# Patient Record
Sex: MALE | Race: WHITE | NOT HISPANIC OR LATINO | Employment: OTHER | URBAN - METROPOLITAN AREA
[De-identification: names, ages, dates, MRNs, and addresses within clinical notes are randomized per-mention and may not be internally consistent; named-entity substitution may affect disease eponyms.]

---

## 2017-09-11 ENCOUNTER — HISTORICAL (OUTPATIENT)
Dept: LAB | Facility: HOSPITAL | Age: 62
End: 2017-09-11

## 2017-09-11 LAB
ABS NEUT (OLG): 5.46
ALBUMIN SERPL-MCNC: 4 GM/DL (ref 3.4–5)
ALBUMIN/GLOB SERPL: 0.9 RATIO (ref 1.1–2)
ALP SERPL-CCNC: 29 UNIT/L (ref 50–136)
ALT SERPL-CCNC: 14 UNIT/L (ref 12–78)
AST SERPL-CCNC: 18 UNIT/L (ref 10–37)
BASOPHILS # BLD AUTO: 0.05 X10(3)/MCL
BASOPHILS NFR BLD AUTO: 0.5 %
BILIRUB SERPL-MCNC: 0.4 MG/DL (ref 0.2–1)
BILIRUBIN DIRECT+TOT PNL SERPL-MCNC: 0.1 MG/DL (ref 0.05–0.2)
BILIRUBIN DIRECT+TOT PNL SERPL-MCNC: 0.3 MG/DL
BUN SERPL-MCNC: 14 MG/DL (ref 7–18)
CALCIUM SERPL-MCNC: 9.8 MG/DL (ref 8.5–10.1)
CHLORIDE SERPL-SCNC: 99 MMOL/L (ref 98–107)
CHOLEST SERPL-MCNC: 186 MG/DL (ref 50–200)
CHOLEST/HDLC SERPL: 2 {RATIO} (ref 0–5)
CO2 SERPL-SCNC: 27.8 MMOL/L (ref 21–32)
CREAT SERPL-MCNC: 1.01 MG/DL (ref 0.7–1.3)
EOSINOPHIL # BLD AUTO: 0.19 X10(3)/MCL
EOSINOPHIL NFR BLD AUTO: 2 %
ERYTHROCYTE [DISTWIDTH] IN BLOOD BY AUTOMATED COUNT: 15 %
GLOBULIN SER-MCNC: 4.3 GM/DL (ref 2.4–3.5)
GLUCOSE SERPL-MCNC: 103 MG/DL (ref 74–106)
HCT VFR BLD AUTO: 48.7 % (ref 39–49)
HDLC SERPL-MCNC: 85 MG/DL (ref 35–60)
HGB BLD-MCNC: 16.6 GM/DL (ref 12.6–16.6)
IMM GRANULOCYTES # BLD AUTO: 0.07 10*3/UL (ref 0–0.1)
IMM GRANULOCYTES NFR BLD AUTO: 0.7 % (ref 0–1)
LDLC SERPL CALC-MCNC: 92 MG/DL (ref 50–140)
LYMPHOCYTES # BLD AUTO: 2.71 X10(3)/MCL
LYMPHOCYTES NFR BLD AUTO: 28.5 %
MCH RBC QN AUTO: 30.5 PG (ref 27–33)
MCHC RBC AUTO-ENTMCNC: 34.1 GM/DL (ref 32–35)
MCV RBC AUTO: 89.4 FL (ref 84–97)
MONOCYTES # BLD AUTO: 1.02 X10(3)/MCL
MONOCYTES NFR BLD AUTO: 10.7 %
NEUTROPHILS # BLD AUTO: 5.46 X10(3)/MCL
NEUTROPHILS NFR BLD AUTO: 57.6 %
PLATELET # BLD AUTO: 265 X10(3)/MCL (ref 151–368)
PMV BLD AUTO: 10 FL
POTASSIUM SERPL-SCNC: 3.9 MMOL/L (ref 3.5–5.1)
PROT SERPL-MCNC: 8.3 GM/DL (ref 6.4–8.2)
PSA SERPL-MCNC: 0.48 NG/ML (ref 0–4)
RBC # BLD AUTO: 5.45 X10(6)/MCL (ref 4.3–5.6)
SODIUM SERPL-SCNC: 138 MMOL/L (ref 136–145)
TRIGL SERPL-MCNC: 43 MG/DL (ref 30–150)
VLDLC SERPL CALC-MCNC: 9 MG/DL
WBC # SPEC AUTO: 9.5 X10(3)/MCL (ref 3.4–9.2)

## 2018-08-09 ENCOUNTER — HISTORICAL (OUTPATIENT)
Dept: SURGERY | Facility: HOSPITAL | Age: 63
End: 2018-08-09

## 2018-08-09 LAB
ABS NEUT (OLG): 4.3 X10(3)/MCL (ref 1.5–6.9)
ALBUMIN SERPL-MCNC: 3.6 GM/DL (ref 3.4–5)
ALBUMIN/GLOB SERPL: 0.9 RATIO
ALP SERPL-CCNC: 27 UNIT/L (ref 30–113)
ALT SERPL-CCNC: 29 UNIT/L (ref 10–45)
APTT PPP: 26.8 SECOND(S) (ref 25–35)
AST SERPL-CCNC: 29 UNIT/L (ref 15–37)
BASOPHILS # BLD AUTO: 0 X10(3)/MCL (ref 0–0.1)
BASOPHILS NFR BLD AUTO: 0 % (ref 0–1)
BILIRUB SERPL-MCNC: 0.3 MG/DL (ref 0.1–0.9)
BILIRUBIN DIRECT+TOT PNL SERPL-MCNC: 0.1 MG/DL (ref 0–0.3)
BILIRUBIN DIRECT+TOT PNL SERPL-MCNC: 0.2 MG/DL
BUN SERPL-MCNC: 15 MG/DL (ref 10–20)
CALCIUM SERPL-MCNC: 9.7 MG/DL (ref 8–10.5)
CHLORIDE SERPL-SCNC: 99 MMOL/L (ref 100–108)
CO2 SERPL-SCNC: 31 MMOL/L (ref 21–35)
CREAT SERPL-MCNC: 1.27 MG/DL (ref 0.7–1.3)
EOSINOPHIL # BLD AUTO: 0.3 X10(3)/MCL (ref 0–0.6)
EOSINOPHIL NFR BLD AUTO: 4 % (ref 0–5)
ERYTHROCYTE [DISTWIDTH] IN BLOOD BY AUTOMATED COUNT: 14.9 % (ref 11.5–17)
GLOBULIN SER-MCNC: 4 GM/DL
GLUCOSE SERPL-MCNC: 93 MG/DL (ref 75–116)
HCT VFR BLD AUTO: 42.7 % (ref 42–52)
HGB BLD-MCNC: 15.1 GM/DL (ref 14–18)
INR PPP: 1 (ref 0–1.2)
LYMPHOCYTES # BLD AUTO: 2.6 X10(3)/MCL (ref 0.5–4.1)
LYMPHOCYTES NFR BLD AUTO: 30.8 % (ref 15–40)
MCH RBC QN AUTO: 31 PG (ref 27–34)
MCHC RBC AUTO-ENTMCNC: 35 GM/DL (ref 31–36)
MCV RBC AUTO: 88 FL (ref 80–99)
MONOCYTES # BLD AUTO: 1.2 X10(3)/MCL (ref 0–1.1)
MONOCYTES NFR BLD AUTO: 14 % (ref 4–12)
NEUTROPHILS # BLD AUTO: 4.3 X10(3)/MCL (ref 1.5–6.9)
NEUTROPHILS NFR BLD AUTO: 51 % (ref 43–75)
PLATELET # BLD AUTO: 220 X10(3)/MCL (ref 140–400)
PMV BLD AUTO: 11 FL (ref 6.8–10)
POTASSIUM SERPL-SCNC: 4.8 MMOL/L (ref 3.6–5.2)
PROT SERPL-MCNC: 7.6 GM/DL (ref 6.4–8.2)
PROTHROMBIN TIME: 10.1 SECOND(S) (ref 9–12)
RBC # BLD AUTO: 4.87 X10(6)/MCL (ref 4.7–6.1)
SODIUM SERPL-SCNC: 137 MMOL/L (ref 135–145)
WBC # SPEC AUTO: 8.5 X10(3)/MCL (ref 4.5–11.5)

## 2018-08-13 ENCOUNTER — HISTORICAL (OUTPATIENT)
Dept: ANESTHESIOLOGY | Facility: HOSPITAL | Age: 63
End: 2018-08-13

## 2018-08-22 ENCOUNTER — HISTORICAL (OUTPATIENT)
Dept: RADIOLOGY | Facility: HOSPITAL | Age: 63
End: 2018-08-22

## 2019-04-23 ENCOUNTER — HISTORICAL (OUTPATIENT)
Dept: ADMINISTRATIVE | Facility: HOSPITAL | Age: 64
End: 2019-04-23

## 2019-07-17 ENCOUNTER — HISTORICAL (OUTPATIENT)
Dept: ADMINISTRATIVE | Facility: HOSPITAL | Age: 64
End: 2019-07-17

## 2019-07-22 PROBLEM — M48.02 CERVICAL STENOSIS OF SPINAL CANAL: Status: ACTIVE | Noted: 2019-07-22

## 2019-07-26 PROBLEM — S14.109A CONTUSION OF CERVICAL CORD: Status: ACTIVE | Noted: 2019-07-26

## 2019-07-26 PROBLEM — R29.898 BILATERAL ARM WEAKNESS: Status: ACTIVE | Noted: 2019-07-26

## 2019-12-18 PROBLEM — Z98.1 S/P CERVICAL SPINAL FUSION: Status: ACTIVE | Noted: 2019-12-18

## 2020-12-07 LAB
ALBUMIN SERPL-MCNC: 4.4 G/DL (ref 3.4–5)
ALBUMIN/GLOB SERPL: 1.5 {RATIO}
ALP SERPL-CCNC: 38 U/L (ref 50–144)
ALT SERPL-CCNC: 11 U/L (ref 1–45)
ANION GAP SERPL CALC-SCNC: 8 MMOL/L (ref 7–16)
AST SERPL-CCNC: 24 U/L (ref 17–59)
BASOPHILS # BLD AUTO: 0.02 X10(3)/MCL (ref 0.01–0.08)
BASOPHILS NFR BLD AUTO: 0.3 % (ref 0.1–1.2)
BILIRUB SERPL-MCNC: 0.3 MG/DL (ref 0.1–1)
BUN SERPL-MCNC: 14 MG/DL (ref 7–20)
CALCIUM SERPL-MCNC: 10 MG/DL (ref 8.4–10.2)
CHLORIDE SERPL-SCNC: 98 MMOL/L (ref 94–110)
CHOLEST SERPL-MCNC: 163 MG/DL (ref 0–200)
CO2 SERPL-SCNC: 32 MMOL/L (ref 21–32)
CREAT SERPL-MCNC: 0.8 MG/DL (ref 0.66–1.25)
CREAT/UREA NIT SERPL: 17.5 (ref 12–20)
DEPRECATED CALCIDIOL+CALCIFEROL SERPL-MC: 24.1 NG/ML (ref 30–100)
EOSINOPHIL # BLD AUTO: 0.23 X10(3)/MCL (ref 0.04–0.54)
EOSINOPHIL NFR BLD AUTO: 2.9 % (ref 0.7–7)
ERYTHROCYTE [DISTWIDTH] IN BLOOD BY AUTOMATED COUNT: 15.1 % (ref 11.6–14.4)
EST. AVERAGE GLUCOSE BLD GHB EST-MCNC: 99 MG/DL (ref 70–115)
GLOBULIN SER-MCNC: 2.9 G/DL (ref 2–3.9)
GLUCOSE SERPL-MCNC: 90 MGM./DL (ref 70–115)
HBA1C MFR BLD: 5.3 % (ref 4–6)
HCT VFR BLD AUTO: 50.9 % (ref 36–52)
HDLC SERPL-MCNC: 57 MG/DL (ref 40–60)
HGB BLD-MCNC: 16.2 G/DL (ref 13–18)
IMM GRANULOCYTES # BLD AUTO: 0.02 X10E3/UL (ref 0–0.03)
IMM GRANULOCYTES NFR BLD AUTO: 0.3 % (ref 0–0.5)
LDLC SERPL CALC-MCNC: 94.2 MG/DL (ref 30–100)
LYMPHOCYTES # BLD AUTO: 2.77 X10(3)/MCL (ref 1.32–3.57)
LYMPHOCYTES NFR BLD AUTO: 34.7 % (ref 20–55)
MCH RBC QN AUTO: 28.9 PG (ref 27–34)
MCHC RBC AUTO-ENTMCNC: 31.8 G/DL (ref 31–37)
MCV RBC AUTO: 90.9 FL (ref 79–99)
MONOCYTES # BLD AUTO: 0.76 X10(3)/MCL (ref 0.3–0.82)
MONOCYTES NFR BLD AUTO: 9.5 % (ref 4.7–12.5)
NEUTROPHILS # BLD AUTO: 4.19 X10(3)/MCL (ref 1.78–5.38)
NEUTROPHILS NFR BLD AUTO: 52.3 % (ref 37–73)
PLATELET # BLD AUTO: 224 X10(3)/MCL (ref 140–371)
PMV BLD AUTO: 11.8 FL (ref 9.4–12.4)
POTASSIUM SERPL-SCNC: 4.3 MMOL/L (ref 3.5–5.1)
PROT SERPL-MCNC: 7.3 G/DL (ref 6.3–8.2)
RBC # BLD AUTO: 5.6 X10(6)/MCL (ref 4–6)
SODIUM SERPL-SCNC: 138 MMOL/L (ref 135–145)
TRIGL SERPL-MCNC: 78 MG/DL (ref 30–200)
TSH SERPL-ACNC: 0.8 UIU/ML (ref 0.36–3.74)
URATE SERPL-MCNC: 3.6 MG/DL (ref 2.6–7.2)
VIT B12 SERPL-MCNC: 245 PG/ML (ref 211–946)
WBC # SPEC AUTO: 8 X10(3)/MCL (ref 4–11.5)

## 2022-04-10 ENCOUNTER — HISTORICAL (OUTPATIENT)
Dept: ADMINISTRATIVE | Facility: HOSPITAL | Age: 67
End: 2022-04-10

## 2022-04-28 VITALS
WEIGHT: 196 LBS | HEIGHT: 72 IN | BODY MASS INDEX: 26.55 KG/M2 | DIASTOLIC BLOOD PRESSURE: 84 MMHG | SYSTOLIC BLOOD PRESSURE: 162 MMHG

## 2022-04-30 ENCOUNTER — HISTORICAL (OUTPATIENT)
Dept: ADMINISTRATIVE | Facility: HOSPITAL | Age: 67
End: 2022-04-30

## 2022-04-30 NOTE — OP NOTE
ADMITTING DIAGNOSIS:  Need for age-appropriate screening colonoscopy.    PROCEDURE:  Colonoscopy to the cecum with intubation of ileocecal valve.    POSTOPERATIVE DIAGNOSES:    1. Normal terminal ileum up to 20 cm.    2. Normal colonoscopy.    3. Diverticulosis of the sigmoid colon.    4. Grade 3 internal hemorrhoids.    PLAN:    1. Follow up in the office in a week to discuss results.    2. Follow up in 2 years recheck stools for blood.    3. Follow up in 10 years repeat screening colonoscopy.    INDICATIONS:  The patient is a 62-year-old  male with reflux who has asthma and emphysema, on oxygen and inhaler.  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  The patient has also had malaria in [place] when he was younger and smokes about 1-1/2 packs a day for about 50 years.  The patient required endoscopic screening of the colon all the way to the cecum and intubation of the ileocecal valve.    FINDINGS:  The terminal ileum was normal up to 20 cm.  Cecum, ascending colon, transverse colon, descending colon were normal.  The patient's rectosigmoid had some diverticulosis.  Grade 2 internal hemorrhoids were noted.  No other pathology was seen.  Overall, the patient did very well had no problems or difficulties.  He was awakened and sent to recovery in good condition.     I appreciate the consultation referral from Ms Leigh Ng, and will notify her of my findings.  My intention will be to follow up in the office to discuss hemorrhoids and otherwise he did     request to go see Dr. Patricia with regard to his testicular issues as well.  He evidently had an epididymitis and was being treated with antibiotics.        BBS/MODL   DD: 08/13/2018 1426   DT: 08/13/2018 1435  Job # [id number]    cc: LEIGH NG MS

## 2024-08-19 ENCOUNTER — HOSPITAL ENCOUNTER (EMERGENCY)
Facility: HOSPITAL | Age: 69
Discharge: LEFT BEFORE DISCHARGE | End: 2024-08-19
Attending: EMERGENCY MEDICINE
Payer: MEDICAID

## 2024-08-19 VITALS
HEART RATE: 47 BPM | HEIGHT: 73 IN | DIASTOLIC BLOOD PRESSURE: 92 MMHG | SYSTOLIC BLOOD PRESSURE: 180 MMHG | TEMPERATURE: 98 F | WEIGHT: 180 LBS | RESPIRATION RATE: 19 BRPM | BODY MASS INDEX: 23.86 KG/M2 | OXYGEN SATURATION: 96 %

## 2024-08-19 DIAGNOSIS — R11.0 NAUSEA: Primary | ICD-10-CM

## 2024-08-19 DIAGNOSIS — N28.0 RENAL INFARCT: ICD-10-CM

## 2024-08-19 DIAGNOSIS — R11.2 NAUSEA AND VOMITING, UNSPECIFIED VOMITING TYPE: ICD-10-CM

## 2024-08-19 DIAGNOSIS — R10.9 ABDOMINAL PAIN, UNSPECIFIED ABDOMINAL LOCATION: ICD-10-CM

## 2024-08-19 DIAGNOSIS — I10 HYPERTENSION, UNSPECIFIED TYPE: ICD-10-CM

## 2024-08-19 LAB
ALBUMIN SERPL-MCNC: 3.2 G/DL (ref 3.4–4.8)
ALBUMIN/GLOB SERPL: 0.8 RATIO (ref 1.1–2)
ALP SERPL-CCNC: 41 UNIT/L (ref 40–150)
ALT SERPL-CCNC: 89 UNIT/L (ref 0–55)
AMPHET UR QL SCN: NEGATIVE
ANION GAP SERPL CALC-SCNC: 14 MEQ/L
APAP SERPL-MCNC: <3 UG/ML (ref 10–30)
AST SERPL-CCNC: 115 UNIT/L (ref 5–34)
BACTERIA #/AREA URNS AUTO: ABNORMAL /HPF
BARBITURATE SCN PRESENT UR: NEGATIVE
BASOPHILS # BLD AUTO: 0.03 X10(3)/MCL
BASOPHILS NFR BLD AUTO: 0.2 %
BENZODIAZ UR QL SCN: NEGATIVE
BILIRUB SERPL-MCNC: 0.7 MG/DL
BILIRUB UR QL STRIP.AUTO: NEGATIVE
BUN SERPL-MCNC: 12.1 MG/DL (ref 8.4–25.7)
CALCIUM SERPL-MCNC: 9.2 MG/DL (ref 8.8–10)
CANNABINOIDS UR QL SCN: POSITIVE
CHLORIDE SERPL-SCNC: 101 MMOL/L (ref 98–107)
CLARITY UR: CLEAR
CO2 SERPL-SCNC: 20 MMOL/L (ref 23–31)
COCAINE UR QL SCN: NEGATIVE
COLOR UR AUTO: YELLOW
CREAT SERPL-MCNC: 1.14 MG/DL (ref 0.73–1.18)
CREAT/UREA NIT SERPL: 11
EOSINOPHIL # BLD AUTO: 0.01 X10(3)/MCL (ref 0–0.9)
EOSINOPHIL NFR BLD AUTO: 0.1 %
ERYTHROCYTE [DISTWIDTH] IN BLOOD BY AUTOMATED COUNT: 15.2 % (ref 11.5–17)
ETHANOL SERPL-MCNC: <10 MG/DL
FENTANYL UR QL SCN: NEGATIVE
FLUAV AG UPPER RESP QL IA.RAPID: NOT DETECTED
FLUBV AG UPPER RESP QL IA.RAPID: NOT DETECTED
GFR SERPLBLD CREATININE-BSD FMLA CKD-EPI: >60 ML/MIN/1.73/M2
GLOBULIN SER-MCNC: 3.8 GM/DL (ref 2.4–3.5)
GLUCOSE SERPL-MCNC: 116 MG/DL (ref 82–115)
GLUCOSE UR QL STRIP: ABNORMAL
HCT VFR BLD AUTO: 44.9 % (ref 42–52)
HGB BLD-MCNC: 15.6 G/DL (ref 14–18)
HGB UR QL STRIP: ABNORMAL
IMM GRANULOCYTES # BLD AUTO: 0.05 X10(3)/MCL (ref 0–0.04)
IMM GRANULOCYTES NFR BLD AUTO: 0.4 %
KETONES UR QL STRIP: NEGATIVE
LACTATE SERPL-SCNC: 2.1 MMOL/L (ref 0.5–2.2)
LEUKOCYTE ESTERASE UR QL STRIP: NEGATIVE
LIPASE SERPL-CCNC: 7 U/L
LYMPHOCYTES # BLD AUTO: 1.44 X10(3)/MCL (ref 0.6–4.6)
LYMPHOCYTES NFR BLD AUTO: 10.9 %
MAGNESIUM SERPL-MCNC: 1.8 MG/DL (ref 1.6–2.6)
MCH RBC QN AUTO: 29.1 PG (ref 27–31)
MCHC RBC AUTO-ENTMCNC: 34.7 G/DL (ref 33–36)
MCV RBC AUTO: 83.8 FL (ref 80–94)
MDMA UR QL SCN: NEGATIVE
MONOCYTES # BLD AUTO: 1.48 X10(3)/MCL (ref 0.1–1.3)
MONOCYTES NFR BLD AUTO: 11.2 %
MUCOUS THREADS URNS QL MICRO: ABNORMAL /LPF
NEUTROPHILS # BLD AUTO: 10.16 X10(3)/MCL (ref 2.1–9.2)
NEUTROPHILS NFR BLD AUTO: 77.2 %
NITRITE UR QL STRIP: NEGATIVE
NRBC BLD AUTO-RTO: 0 %
OPIATES UR QL SCN: NEGATIVE
PCP UR QL: NEGATIVE
PH UR STRIP: 6 [PH]
PH UR: 6 [PH] (ref 3–11)
PLATELET # BLD AUTO: 167 X10(3)/MCL (ref 130–400)
PMV BLD AUTO: 9.8 FL (ref 7.4–10.4)
POTASSIUM SERPL-SCNC: 4 MMOL/L (ref 3.5–5.1)
PROT SERPL-MCNC: 7 GM/DL (ref 5.8–7.6)
PROT UR QL STRIP: ABNORMAL
RBC # BLD AUTO: 5.36 X10(6)/MCL (ref 4.7–6.1)
RBC #/AREA URNS AUTO: ABNORMAL /HPF
SARS-COV-2 RNA RESP QL NAA+PROBE: NOT DETECTED
SODIUM SERPL-SCNC: 135 MMOL/L (ref 136–145)
SP GR UR STRIP.AUTO: 1.02 (ref 1–1.03)
SPECIFIC GRAVITY, URINE AUTO (.000) (OHS): 1.02 (ref 1–1.03)
SQUAMOUS #/AREA URNS LPF: ABNORMAL /HPF
TROPONIN I SERPL-MCNC: 0.04 NG/ML (ref 0–0.04)
UROBILINOGEN UR STRIP-ACNC: 2
WBC # BLD AUTO: 13.17 X10(3)/MCL (ref 4.5–11.5)
WBC #/AREA URNS AUTO: ABNORMAL /HPF

## 2024-08-19 PROCEDURE — 85025 COMPLETE CBC W/AUTO DIFF WBC: CPT | Performed by: EMERGENCY MEDICINE

## 2024-08-19 PROCEDURE — 80074 ACUTE HEPATITIS PANEL: CPT | Performed by: STUDENT IN AN ORGANIZED HEALTH CARE EDUCATION/TRAINING PROGRAM

## 2024-08-19 PROCEDURE — 0240U COVID/FLU A&B PCR: CPT | Performed by: EMERGENCY MEDICINE

## 2024-08-19 PROCEDURE — 83690 ASSAY OF LIPASE: CPT | Performed by: EMERGENCY MEDICINE

## 2024-08-19 PROCEDURE — 83605 ASSAY OF LACTIC ACID: CPT | Performed by: EMERGENCY MEDICINE

## 2024-08-19 PROCEDURE — 99285 EMERGENCY DEPT VISIT HI MDM: CPT | Mod: 25

## 2024-08-19 PROCEDURE — 81001 URINALYSIS AUTO W/SCOPE: CPT | Mod: XB | Performed by: EMERGENCY MEDICINE

## 2024-08-19 PROCEDURE — 96374 THER/PROPH/DIAG INJ IV PUSH: CPT | Mod: 59

## 2024-08-19 PROCEDURE — 63600175 PHARM REV CODE 636 W HCPCS: Performed by: EMERGENCY MEDICINE

## 2024-08-19 PROCEDURE — 81015 MICROSCOPIC EXAM OF URINE: CPT | Performed by: EMERGENCY MEDICINE

## 2024-08-19 PROCEDURE — 25500020 PHARM REV CODE 255: Performed by: STUDENT IN AN ORGANIZED HEALTH CARE EDUCATION/TRAINING PROGRAM

## 2024-08-19 PROCEDURE — 84484 ASSAY OF TROPONIN QUANT: CPT | Performed by: EMERGENCY MEDICINE

## 2024-08-19 PROCEDURE — 83735 ASSAY OF MAGNESIUM: CPT | Performed by: EMERGENCY MEDICINE

## 2024-08-19 PROCEDURE — 80143 DRUG ASSAY ACETAMINOPHEN: CPT | Performed by: STUDENT IN AN ORGANIZED HEALTH CARE EDUCATION/TRAINING PROGRAM

## 2024-08-19 PROCEDURE — 25000242 PHARM REV CODE 250 ALT 637 W/ HCPCS: Performed by: EMERGENCY MEDICINE

## 2024-08-19 PROCEDURE — 80307 DRUG TEST PRSMV CHEM ANLYZR: CPT | Performed by: EMERGENCY MEDICINE

## 2024-08-19 PROCEDURE — 25000003 PHARM REV CODE 250: Performed by: EMERGENCY MEDICINE

## 2024-08-19 PROCEDURE — 82077 ASSAY SPEC XCP UR&BREATH IA: CPT | Performed by: EMERGENCY MEDICINE

## 2024-08-19 PROCEDURE — 80053 COMPREHEN METABOLIC PANEL: CPT | Performed by: EMERGENCY MEDICINE

## 2024-08-19 RX ORDER — NAPROXEN SODIUM 220 MG/1
324 TABLET, FILM COATED ORAL
Status: COMPLETED | OUTPATIENT
Start: 2024-08-19 | End: 2024-08-19

## 2024-08-19 RX ORDER — DROPERIDOL 2.5 MG/ML
1.25 INJECTION, SOLUTION INTRAMUSCULAR; INTRAVENOUS
Status: COMPLETED | OUTPATIENT
Start: 2024-08-19 | End: 2024-08-19

## 2024-08-19 RX ORDER — ALBUTEROL SULFATE 0.83 MG/ML
2.5 SOLUTION RESPIRATORY (INHALATION)
Status: COMPLETED | OUTPATIENT
Start: 2024-08-19 | End: 2024-08-19

## 2024-08-19 RX ADMIN — ASPIRIN 81 MG CHEWABLE TABLET 324 MG: 81 TABLET CHEWABLE at 04:08

## 2024-08-19 RX ADMIN — ALBUTEROL SULFATE 2.5 MG: 2.5 SOLUTION RESPIRATORY (INHALATION) at 04:08

## 2024-08-19 RX ADMIN — IOHEXOL 100 ML: 350 INJECTION, SOLUTION INTRAVENOUS at 07:08

## 2024-08-19 RX ADMIN — DROPERIDOL 1.25 MG: 2.5 INJECTION, SOLUTION INTRAMUSCULAR; INTRAVENOUS at 04:08

## 2024-08-19 NOTE — ED PROVIDER NOTES
"Encounter Date: 8/19/2024    SCRIBE #1 NOTE: I, Dejon Martin, am scribing for, and in the presence of,  Giovani Hutton MD. I have scribed the following portions of the note - Other sections scribed: HPI, ROS, PE.       History     Chief Complaint   Patient presents with    Nausea     Via [facility] for nausea, shortness of breath, and neck pain x "a few days". States this am BP was elevated, but "hasn't taken any medications for years". Multiple old spinal fractures per pt, bedbound. 4 mg zofran given en route, pt took old lisinopril prior to EMS arrival. Unsure of dosage     Patient is a 69 y/o male with a history of HTN, HLD, CAD who presents to the ED via EMS for evaluation of nausea and vomiting over the past couple of days. Pt states that he cannot tolerate food without vomiting. He also reports associated upper abdominal pain, SOB, and elevated blood pressure with a systolic pressure in the 200s at home. Denies chest pain and bowel movement irregularities, states he has bowel movements every 3-4 days which is normal for him. He is a heavy smoker of both marijuana and cigarettes. He hasn't seen a doctor and hasn't taken any medications or breathing treatments for the past 3-4 years. Pt is bedbound, history of C2-C4 surgery with bilateral lower extremity weakness. Denies any current bedsores. Lives with his daughter.     The history is provided by the patient. No  was used.     Review of patient's allergies indicates:  No Known Allergies  Past Medical History:   Diagnosis Date    Contusion of cervical cord     Hepatitis     Hypertension     Mental disorder      Past Surgical History:   Procedure Laterality Date    FRACTURE SURGERY      SPINE SURGERY  09/04/2019    ACDF @ C3/4, 09/04/19, [facility], DR. SIN.    SPINE SURGERY  10/18/2019    C3-6 DCL, [facility], DR. SIN.     Family History   Problem Relation Name Age of Onset    Heart disease Mother      Cancer Father       Social History     Tobacco Use "    Smoking status: Every Day     Current packs/day: 0.50     Types: Cigarettes    Smokeless tobacco: Never   Substance Use Topics    Alcohol use: Never    Drug use: Yes     Review of Systems   Constitutional:  Negative for chills and fever.   Respiratory:  Positive for shortness of breath. Negative for cough.    Cardiovascular:  Negative for chest pain.   Gastrointestinal:  Positive for abdominal pain, nausea and vomiting. Negative for constipation and diarrhea.   Musculoskeletal:  Negative for myalgias.   All other systems reviewed and are negative.      Physical Exam     Initial Vitals   BP Pulse Resp Temp SpO2   08/19/24 1435 08/19/24 1435 08/19/24 1435 08/19/24 1433 08/19/24 1435   (!) 164/94 (!) 53 18 98.2 °F (36.8 °C) 98 %      MAP       --                Physical Exam    Nursing note and vitals reviewed.  Constitutional: He appears well-developed and well-nourished. No distress.   Smells of cigarettes   HENT:   Head: Normocephalic and atraumatic.   Eyes: Conjunctivae are normal.   Cardiovascular:    Bradycardia present.         Pulmonary/Chest: No respiratory distress. He has no wheezes. He has no rhonchi.   Diminished breath sounds bilaterally   Abdominal: Abdomen is soft. There is abdominal tenderness (upper). There is no rebound and no guarding.   Musculoskeletal:      Comments: Nicotine stains to fingers     Neurological: He is alert and oriented to person, place, and time.   Chronic paresis of lower extremities   Skin: Skin is warm and dry.   Psychiatric: He has a normal mood and affect.         ED Course   Procedures  Labs Reviewed   COMPREHENSIVE METABOLIC PANEL - Abnormal       Result Value    Sodium 135 (*)     Potassium 4.0      Chloride 101      CO2 20 (*)     Glucose 116 (*)     Blood Urea Nitrogen 12.1      Creatinine 1.14      Calcium 9.2      Protein Total 7.0      Albumin 3.2 (*)     Globulin 3.8 (*)     Albumin/Globulin Ratio 0.8 (*)     Bilirubin Total 0.7      ALP 41      ALT 89 (*)     AST  115 (*)     eGFR >60      Anion Gap 14.0      BUN/Creatinine Ratio 11     DRUG SCREEN, URINE (BEAKER) - Abnormal    Amphetamines, Urine Negative      Barbiturates, Urine Negative      Benzodiazepine, Urine Negative      Cannabinoids, Urine Positive (*)     Cocaine, Urine Negative      Fentanyl, Urine Negative      MDMA, Urine Negative      Opiates, Urine Negative      Phencyclidine, Urine Negative      pH, Urine 6.0      Specific Gravity, Urine Auto 1.023      Narrative:     Cut off concentrations:    Amphetamines - 1000 ng/ml  Barbiturates - 200 ng/ml  Benzodiazepine - 200 ng/ml  Cannabinoids (THC) - 50 ng/ml  Cocaine - 300 ng/ml  Fentanyl - 1.0 ng/ml  MDMA - 500 ng/ml  Opiates - 300 ng/ml   Phencyclidine (PCP) - 25 ng/ml    Specimen submitted for drug analysis and tested for pH and specific gravity in order to evaluate sample integrity. Suspect tampering if specific gravity is <1.003 and/or pH is not within the range of 4.5 - 8.0  False negatives may result form substances such as bleach added to urine.  False positives may result for the presence of a substance with similar chemical structure to the drug or its metabolite.    This test provides only a PRELIMINARY analytical test result. A more specific alternate chemical method must be used in order to obtain a confirmed analytical result. Gas chromatography/mass spectrometry (GC/MS) is the preferred confirmatory method. Other chemical confirmation methods are available. Clinical consideration and professional judgement should be applied to any drug of abuse test result, particularly when preliminary positive results are used.    Positive results will be confirmed only at the physicians request. Unconfirmed screening results are to be used only for medical purposes (treatment).        URINALYSIS, REFLEX TO URINE CULTURE - Abnormal    Color, UA Yellow      Appearance, UA Clear      Specific Gravity, UA 1.023      pH, UA 6.0      Protein, UA 1+ (*)     Glucose, UA  3+ (*)     Ketones, UA Negative      Blood, UA Trace (*)     Bilirubin, UA Negative      Urobilinogen, UA 2.0 (*)     Nitrites, UA Negative      Leukocyte Esterase, UA Negative      RBC, UA 6-10 (*)     WBC, UA 0-5      Bacteria, UA None Seen      Squamous Epithelial Cells, UA None Seen      Mucous, UA Occasional (*)    CBC WITH DIFFERENTIAL - Abnormal    WBC 13.17 (*)     RBC 5.36      Hgb 15.6      Hct 44.9      MCV 83.8      MCH 29.1      MCHC 34.7      RDW 15.2      Platelet 167      MPV 9.8      Neut % 77.2      Lymph % 10.9      Mono % 11.2      Eos % 0.1      Basophil % 0.2      Lymph # 1.44      Neut # 10.16 (*)     Mono # 1.48 (*)     Eos # 0.01      Baso # 0.03      IG# 0.05 (*)     IG% 0.4      NRBC% 0.0     ACETAMINOPHEN LEVEL - Abnormal    Acetaminophen Level <3.0 (*)    COVID/FLU A&B PCR - Normal    Influenza A PCR Not Detected      Influenza B PCR Not Detected      SARS-CoV-2 PCR Not Detected      Narrative:     The Xpert Xpress SARS-CoV-2/FLU/RSV plus is a rapid, multiplexed real-time PCR test intended for the simultaneous qualitative detection and differentiation of SARS-CoV-2, Influenza A, Influenza B, and respiratory syncytial virus (RSV) viral RNA in either nasopharyngeal swab or nasal swab specimens.         ALCOHOL,MEDICAL (ETHANOL) - Normal    Ethanol Level <10.0     LACTIC ACID, PLASMA - Normal    Lactic Acid Level 2.1     MAGNESIUM - Normal    Magnesium Level 1.80     LIPASE - Normal    Lipase Level 7     TROPONIN I - Normal    Troponin-I 0.035     CBC W/ AUTO DIFFERENTIAL    Narrative:     The following orders were created for panel order CBC auto differential.  Procedure                               Abnormality         Status                     ---------                               -----------         ------                     CBC with Differential[[id number]]        Abnormal            Final result                 Please view results for these tests on the individual orders.    HEPATITIS PANEL, ACUTE          Imaging Results              CT Abdomen Pelvis With IV Contrast NO Oral Contrast (Final result)  Result time 08/19/24 19:50:26      Final result by Dillon Toledo MD (08/19/24 19:50:26)                   Impression:      1. Bilateral nephrogram defects, pyelonephritis versus renal infarcts.  2. Indeterminate right adrenal nodule.  This could be further evaluated with outpatient pre and post-contrast adrenal mass protocol CT of the abdomen if needed.      Electronically signed by: Dillon Toledo  Date:    08/19/2024  Time:    19:50               Narrative:    EXAMINATION:  CT ABDOMEN PELVIS WITH IV CONTRAST    CLINICAL HISTORY:  Epigastric pain;    TECHNIQUE:  Helical acquisition through the abdomen and pelvis with IV contrast.  Three plane reconstructions were provided for review.  mGycm. Automatic exposure control, adjustment of mA/kV or iterative reconstruction technique was used to reduce radiation.    COMPARISON:  No prior CT    FINDINGS:  Mild atelectasis at the lung bases.    Left hepatic lobe is small.  Few small up attic hypodensities cannot be further characterized.  Patent portal vein.    No significant abnormality of the gallbladder, spleen or pancreas.  There is 3 cm right adrenal nodule which is indeterminate.    No hydronephrosis.  There are bilateral renal nephrogram defects.    No bowel obstruction. No significant inflammatory changes of the bowel.  Normal appendix.  No free air.    Urinary bladder not well distended.  No pelvic free fluid.  Ectatic abdominal aorta with dense atherosclerotic disease.    Moderate degenerative change of the spine.                                       US Abdomen Limited_Gallbladder (Final result)  Result time 08/19/24 19:00:42      Final result by Dillon Toledo MD (08/19/24 19:00:42)                   Impression:      1. Gallbladder and common bile duct are not visualized.  2. Left hepatic lobe not visualized.  Indeterminate  3-4 cm nodule adjacent the right hepatic lobe.  Nonemergent contrast enhanced CT may further characterize.      Electronically signed by: Dillon Toledo  Date:    08/19/2024  Time:    19:00               Narrative:    EXAMINATION:  US ABDOMEN LIMITED_GALLBLADDER    CLINICAL HISTORY:  epigastir pain;    COMPARISON:  No previous studies are available for comparison.    FINDINGS:  Grayscale, color and spectral doppler evaluation of the right upper quadrant.    Pancreas obscured.  Aorta and IVC are not visualized.    Left hepatic lobe obscured.  The right hepatic lobe is not significantly enlarged.  There is a 3-4 cm nodule adjacent to the right hepatic lobe which is indeterminate.  Normal hepatopetal flow is noted in the portal vein.    The gallbladder is not the common bile duct is not visualized.    Right kidney measures 11 cm in length. No hydronephrosis.                                       X-Ray Chest AP Portable (Final result)  Result time 08/19/24 16:41:48      Final result by Dillon Toledo MD (08/19/24 16:41:48)                   Impression:      No acute findings.      Electronically signed by: Dillon Toledo  Date:    08/19/2024  Time:    16:41               Narrative:    EXAMINATION:  XR CHEST AP PORTABLE    CLINICAL HISTORY:  sob;    COMPARISON:  15 October 2019    FINDINGS:  Frontal view of the chest was obtained. The heart is not enlarged.  Lungs are clear.  There is no pneumothorax or significant effusion.                                       Medications   lactated ringers bolus 1,000 mL (has no administration in time range)   albuterol nebulizer solution 2.5 mg (2.5 mg Nebulization Given 8/19/24 1610)   aspirin chewable tablet 324 mg (324 mg Oral Given 8/19/24 1610)   droPERidol injection 1.25 mg (1.25 mg Intravenous Given 8/19/24 1638)   iohexoL (OMNIPAQUE 350) injection 100 mL (100 mLs Intravenous Given 8/19/24 1925)     Medical Decision Making  The differential diagnosis includes, but is not limited  to: COPD, pneumonia, lung cancer, MI, pancreatitis, gastroparesis, cannabinoid-induced hyperemesis syndrome, gastritis, cholecystitis.      Patient is a 68-year-old male who presents to the emergency department complaining of abdominal pain.  See HPI.  See physical exam.  I took over care of this patient at the time of shift change.  Lab work notable for mild elevation in liver enzymes.  States used to drink alcohol but has not had any alcohol to drink recently.  Has a history of hepatitis-C.  Discussed incidental findings including right adrenal nodule.  Discussed CT scan of the abdomen pelvis which showed concern for pyelonephritis versus renal infarct.  Denies any dysuria.  Afebrile.  Low suspicion for any pyelonephritis.  Urinalysis without evidence of infection.  I discussed these findings with Nephrology.  Also consulted vascular surgery.  Recommends CTA for further evaluation.  Upon discussion of this with the patient he was requesting to leave against medical advice.  States he has been here for several hours and does not wish to remain any longer.  I discussed need for continued blood pressure control, may require anticoagulation or antiplatelet therapy.  Patient states he was not wish to remain.  He is adamant the that he was leaving.  Family member at the bedside.  I discussed risk of continued infarct, uncontrolled hypertension, risk for heart disease, CVA.  Patient verbalized these risks back to me and states he was leaving.  He has capacity to make his own medical decisions at this time.  Patient signed AMA form.  Discussed should he change his mind return to the emergency department for repeat evaluation.  Encouraged him to follow up with primary care.      Problems Addressed:  Abdominal pain, unspecified abdominal location: acute illness or injury that poses a threat to life or bodily functions  Hypertension, unspecified type: acute illness or injury that poses a threat to life or bodily  functions  Nausea: acute illness or injury that poses a threat to life or bodily functions  Nausea and vomiting, unspecified vomiting type: acute illness or injury that poses a threat to life or bodily functions    Amount and/or Complexity of Data Reviewed  Labs: ordered.  Radiology: ordered and independent interpretation performed.    Risk  OTC drugs.  Prescription drug management.  Decision regarding hospitalization.  Diagnosis or treatment significantly limited by social determinants of health.            Scribe Attestation:   Scribe #1: I performed the above scribed service and the documentation accurately describes the services I performed. I attest to the accuracy of the note.    Attending Attestation:           Physician Attestation for Scribe:  Physician Attestation Statement for Scribe #1: I, Giovani Hutton MD, reviewed documentation, as scribed by Dejon Martin in my presence, and it is both accurate and complete.             ED Course as of 08/19/24 2204   Mon Aug 19, 2024   1859 Patient care handed off to Dr. Galindo [LF]   2032 Paged nephrology.  [RP]   2032 Paged nephrology  [REKHA]   2050 Discussed with Dr. Sepulveda, recommends CTA abdomen/pelvis to r/o.  Consult vascular.  [RP]   2112 Patient requesting to leave against medical advice. [RP]      ED Course User Index  [REKHA] Hieu Martínez  [LF] Giovani Hutton MD  [RP] Semaj Galindo MD                           Clinical Impression:  Final diagnoses:  [R11.0] Nausea (Primary)  [R11.2] Nausea and vomiting, unspecified vomiting type  [R10.9] Abdominal pain, unspecified abdominal location  [N28.0] Renal infarct  [I10] Hypertension, unspecified type          ED Disposition Condition    AMA Stable                Semaj Galindo MD  08/19/24 2209

## 2024-08-20 LAB
HAV IGM SERPL QL IA: NONREACTIVE
HBV CORE IGM SERPL QL IA: NONREACTIVE
HBV SURFACE AG SERPL QL IA: NONREACTIVE
HCV AB SERPL QL IA: REACTIVE

## 2024-08-20 NOTE — DISCHARGE INSTRUCTIONS
You are leaving against medical advice.    Should you change your mind return to the emergency department.    Follow-up with the primary care physician.

## 2024-08-21 LAB — PATH REV: NORMAL

## 2025-01-01 ENCOUNTER — ANESTHESIA (OUTPATIENT)
Dept: SURGERY | Facility: HOSPITAL | Age: 70
End: 2025-01-01
Payer: MEDICARE

## 2025-01-01 ENCOUNTER — HOSPITAL ENCOUNTER (EMERGENCY)
Facility: HOSPITAL | Age: 70
Discharge: ANOTHER HOSPITAL | End: 2025-04-06
Attending: EMERGENCY MEDICINE
Payer: MEDICARE

## 2025-01-01 ENCOUNTER — HOSPITAL ENCOUNTER (INPATIENT)
Facility: HOSPITAL | Age: 70
LOS: 10 days | DRG: 270 | End: 2025-04-16
Attending: INTERNAL MEDICINE | Admitting: STUDENT IN AN ORGANIZED HEALTH CARE EDUCATION/TRAINING PROGRAM
Payer: MEDICARE

## 2025-01-01 ENCOUNTER — ANESTHESIA EVENT (OUTPATIENT)
Dept: SURGERY | Facility: HOSPITAL | Age: 70
End: 2025-01-01
Payer: MEDICARE

## 2025-01-01 VITALS
HEIGHT: 73 IN | RESPIRATION RATE: 17 BRPM | WEIGHT: 165 LBS | DIASTOLIC BLOOD PRESSURE: 98 MMHG | TEMPERATURE: 98 F | BODY MASS INDEX: 21.87 KG/M2 | OXYGEN SATURATION: 96 % | SYSTOLIC BLOOD PRESSURE: 163 MMHG | HEART RATE: 76 BPM

## 2025-01-01 VITALS
WEIGHT: 154.56 LBS | BODY MASS INDEX: 20.49 KG/M2 | HEIGHT: 73 IN | TEMPERATURE: 99 F | DIASTOLIC BLOOD PRESSURE: 60 MMHG | SYSTOLIC BLOOD PRESSURE: 112 MMHG

## 2025-01-01 DIAGNOSIS — R07.9 CHEST PAIN: ICD-10-CM

## 2025-01-01 DIAGNOSIS — M79.605 LEG PAIN, LEFT: ICD-10-CM

## 2025-01-01 DIAGNOSIS — M79.606 ISCHEMIC REST PAIN OF LOWER EXTREMITY: ICD-10-CM

## 2025-01-01 DIAGNOSIS — I70.90 ARTERIAL OCCLUSION: Primary | ICD-10-CM

## 2025-01-01 DIAGNOSIS — K55.069 OCCLUSION OF SUPERIOR MESENTERIC ARTERY: ICD-10-CM

## 2025-01-01 DIAGNOSIS — Z71.89 GOALS OF CARE, COUNSELING/DISCUSSION: ICD-10-CM

## 2025-01-01 DIAGNOSIS — I74.11 THROMBOSIS OF THORACIC AORTA: ICD-10-CM

## 2025-01-01 DIAGNOSIS — I65.29 CAROTID ARTERY STENOSIS: ICD-10-CM

## 2025-01-01 DIAGNOSIS — I74.3 ARTERIAL EMBOLUS OF LOWER EXTREMITY: Primary | ICD-10-CM

## 2025-01-01 DIAGNOSIS — I99.8 ISCHEMIC REST PAIN OF LOWER EXTREMITY: ICD-10-CM

## 2025-01-01 DIAGNOSIS — G54.6 PHANTOM PAIN FOLLOWING AMPUTATION OF UPPER LIMB: ICD-10-CM

## 2025-01-01 DIAGNOSIS — I63.10 CEREBRAL INFARCTION DUE TO EMBOLISM OF PRECEREBRAL ARTERY: ICD-10-CM

## 2025-01-01 DIAGNOSIS — I21.4 NSTEMI (NON-ST ELEVATED MYOCARDIAL INFARCTION): ICD-10-CM

## 2025-01-01 LAB
ABORH RETYPE: NORMAL
ABS NEUT (OLG): 11.73 X10(3)/MCL (ref 2.1–9.2)
ALBUMIN SERPL-MCNC: 2.7 G/DL (ref 3.4–4.8)
ALBUMIN SERPL-MCNC: 3 G/DL (ref 3.4–4.8)
ALBUMIN SERPL-MCNC: 3.6 G/DL (ref 3.4–4.8)
ALBUMIN SERPL-MCNC: 4 G/DL (ref 3.4–4.8)
ALBUMIN/GLOB SERPL: 0.8 RATIO (ref 1.1–2)
ALBUMIN/GLOB SERPL: 0.8 RATIO (ref 1.1–2)
ALBUMIN/GLOB SERPL: 1.1 RATIO (ref 1.1–2)
ALBUMIN/GLOB SERPL: 1.2 RATIO (ref 1.1–2)
ALLENS TEST BLOOD GAS (OHS): YES
ALP SERPL-CCNC: 40 UNIT/L (ref 40–150)
ALP SERPL-CCNC: 47 UNIT/L (ref 40–150)
ALP SERPL-CCNC: 48 UNIT/L (ref 40–150)
ALP SERPL-CCNC: 51 UNIT/L (ref 40–150)
ALT SERPL-CCNC: 5 UNIT/L (ref 0–55)
ALT SERPL-CCNC: 6 UNIT/L (ref 0–55)
ALT SERPL-CCNC: 8 UNIT/L (ref 0–55)
ALT SERPL-CCNC: 9 UNIT/L (ref 0–55)
ANION GAP SERPL CALC-SCNC: 11 MEQ/L
ANION GAP SERPL CALC-SCNC: 12 MEQ/L
ANION GAP SERPL CALC-SCNC: 12 MEQ/L
ANION GAP SERPL CALC-SCNC: 13 MEQ/L
ANION GAP SERPL CALC-SCNC: 13 MEQ/L
ANION GAP SERPL CALC-SCNC: 18 MEQ/L
ANION GAP SERPL CALC-SCNC: 9 MEQ/L
APICAL FOUR CHAMBER EJECTION FRACTION: 38 %
APICAL TWO CHAMBER EJECTION FRACTION: 42 %
APTT PPP: 112.7 SECONDS (ref 23.2–33.7)
APTT PPP: 30.3 SECONDS (ref 24.2–35.9)
APTT PPP: 32.5 SECONDS (ref 23.2–33.7)
APTT PPP: 60.8 SECONDS (ref 23.2–33.7)
APTT PPP: 62.6 SECONDS (ref 23.2–33.7)
APTT PPP: 69.5 SECONDS (ref 23.2–33.7)
APTT PPP: 71.1 SECONDS (ref 23.2–33.7)
APTT PPP: 79.8 SECONDS (ref 23.2–33.7)
APTT PPP: 90.9 SECONDS (ref 23.2–33.7)
APTT PPP: 97.2 SECONDS (ref 23.2–33.7)
APTT PPP: 98.1 SECONDS (ref 23.2–33.7)
APTT PPP: 98.8 SECONDS (ref 23.2–33.7)
APTT PPP: >200 SECONDS (ref 23.2–33.7)
AST SERPL-CCNC: 11 UNIT/L (ref 11–45)
AST SERPL-CCNC: 12 UNIT/L (ref 11–45)
AST SERPL-CCNC: 29 UNIT/L (ref 11–45)
AST SERPL-CCNC: 39 UNIT/L (ref 11–45)
AV INDEX (PROSTH): 0.85
AV MEAN GRADIENT: 3 MMHG
AV PEAK GRADIENT: 5 MMHG
AV VALVE AREA BY VELOCITY RATIO: 3 CM²
AV VALVE AREA: 3.5 CM²
AV VELOCITY RATIO: 0.73
BACTERIA #/AREA URNS AUTO: ABNORMAL /HPF
BASE EXCESS BLD CALC-SCNC: -10.1 MMOL/L (ref -2–2)
BASOPHILS # BLD AUTO: 0.03 X10(3)/MCL
BASOPHILS # BLD AUTO: 0.04 X10(3)/MCL
BASOPHILS # BLD AUTO: 0.05 X10(3)/MCL
BASOPHILS # BLD AUTO: 0.05 X10(3)/MCL
BASOPHILS # BLD AUTO: 0.1 X10(3)/MCL
BASOPHILS NFR BLD AUTO: 0.2 %
BASOPHILS NFR BLD AUTO: 0.3 %
BASOPHILS NFR BLD AUTO: 0.3 %
BASOPHILS NFR BLD AUTO: 0.4 %
BASOPHILS NFR BLD AUTO: 0.4 %
BASOPHILS NFR BLD AUTO: 0.7 %
BILIRUB SERPL-MCNC: 0.4 MG/DL
BILIRUB SERPL-MCNC: 0.4 MG/DL
BILIRUB SERPL-MCNC: 0.6 MG/DL
BILIRUB SERPL-MCNC: 0.8 MG/DL
BILIRUB UR QL STRIP.AUTO: NEGATIVE
BLOOD GAS SAMPLE TYPE (OHS): ABNORMAL
BSA FOR ECHO PROCEDURE: 1.93 M2
BUN SERPL-MCNC: 16.9 MG/DL (ref 8.4–25.7)
BUN SERPL-MCNC: 20.1 MG/DL (ref 8.4–25.7)
BUN SERPL-MCNC: 21 MG/DL (ref 8.4–25.7)
BUN SERPL-MCNC: 21.8 MG/DL (ref 8.4–25.7)
BUN SERPL-MCNC: 23.8 MG/DL (ref 8.4–25.7)
BUN SERPL-MCNC: 26.3 MG/DL (ref 8.4–25.7)
BUN SERPL-MCNC: 27.6 MG/DL (ref 8.4–25.7)
BUN SERPL-MCNC: 32.5 MG/DL (ref 8.4–25.7)
BUN SERPL-MCNC: 40.6 MG/DL (ref 8.4–25.7)
CA-I BLD-SCNC: 1.03 MMOL/L (ref 1.12–1.23)
CALCIUM SERPL-MCNC: 7.9 MG/DL (ref 8.8–10)
CALCIUM SERPL-MCNC: 8.2 MG/DL (ref 8.8–10)
CALCIUM SERPL-MCNC: 8.2 MG/DL (ref 8.8–10)
CALCIUM SERPL-MCNC: 8.6 MG/DL (ref 8.8–10)
CALCIUM SERPL-MCNC: 8.7 MG/DL (ref 8.8–10)
CALCIUM SERPL-MCNC: 8.7 MG/DL (ref 8.8–10)
CALCIUM SERPL-MCNC: 9 MG/DL (ref 8.8–10)
CALCIUM SERPL-MCNC: 9.2 MG/DL (ref 8.8–10)
CALCIUM SERPL-MCNC: 9.2 MG/DL (ref 8.8–10)
CHLORIDE SERPL-SCNC: 100 MMOL/L (ref 98–107)
CHLORIDE SERPL-SCNC: 101 MMOL/L (ref 98–107)
CHLORIDE SERPL-SCNC: 102 MMOL/L (ref 98–107)
CHLORIDE SERPL-SCNC: 105 MMOL/L (ref 98–107)
CHLORIDE SERPL-SCNC: 94 MMOL/L (ref 98–107)
CHLORIDE SERPL-SCNC: 95 MMOL/L (ref 98–107)
CHLORIDE SERPL-SCNC: 96 MMOL/L (ref 98–107)
CK SERPL-CCNC: 31 U/L (ref 30–200)
CLARITY UR: CLEAR
CO2 BLDA-SCNC: 20.2 MMOL/L
CO2 SERPL-SCNC: 18 MMOL/L (ref 23–31)
CO2 SERPL-SCNC: 20 MMOL/L (ref 23–31)
CO2 SERPL-SCNC: 21 MMOL/L (ref 23–31)
CO2 SERPL-SCNC: 22 MMOL/L (ref 23–31)
CO2 SERPL-SCNC: 22 MMOL/L (ref 23–31)
CO2 SERPL-SCNC: 24 MMOL/L (ref 23–31)
CO2 SERPL-SCNC: 27 MMOL/L (ref 23–31)
COHGB MFR BLDA: 0.9 % (ref 0.5–1.5)
COLOR UR AUTO: ABNORMAL
CREAT SERPL-MCNC: 0.97 MG/DL (ref 0.72–1.25)
CREAT SERPL-MCNC: 1.15 MG/DL (ref 0.72–1.25)
CREAT SERPL-MCNC: 1.18 MG/DL (ref 0.72–1.25)
CREAT SERPL-MCNC: 1.18 MG/DL (ref 0.72–1.25)
CREAT SERPL-MCNC: 1.19 MG/DL (ref 0.72–1.25)
CREAT SERPL-MCNC: 1.2 MG/DL (ref 0.72–1.25)
CREAT SERPL-MCNC: 1.31 MG/DL (ref 0.72–1.25)
CREAT SERPL-MCNC: 1.35 MG/DL (ref 0.72–1.25)
CREAT SERPL-MCNC: 1.82 MG/DL (ref 0.72–1.25)
CREAT/UREA NIT SERPL: 12
CREAT/UREA NIT SERPL: 13
CREAT/UREA NIT SERPL: 17
CREAT/UREA NIT SERPL: 19
CREAT/UREA NIT SERPL: 20
CREAT/UREA NIT SERPL: 22
CREAT/UREA NIT SERPL: 27
CREAT/UREA NIT SERPL: 28
CREAT/UREA NIT SERPL: 30
CV ECHO LV RWT: 0.4 CM
DOP CALC AO PEAK VEL: 1.1 M/S
DOP CALC AO VTI: 14.7 CM
DOP CALC LVOT AREA: 4.2 CM2
DOP CALC LVOT DIAMETER: 2.3 CM
DOP CALC LVOT PEAK VEL: 0.8 M/S
DOP CALC LVOT STROKE VOLUME: 51.9 CM3
DOP CALC MV VTI: 13.1 CM
DOP CALCLVOT PEAK VEL VTI: 12.5 CM
DRAWN BY BLOOD GAS (OHS): ABNORMAL
E WAVE DECELERATION TIME: 132 MSEC
E/A RATIO: 0.76
E/E' RATIO: 18 M/S
ECHO LV POSTERIOR WALL: 1.1 CM (ref 0.6–1.1)
EOSINOPHIL # BLD AUTO: 0 X10(3)/MCL (ref 0–0.9)
EOSINOPHIL # BLD AUTO: 0.04 X10(3)/MCL (ref 0–0.9)
EOSINOPHIL # BLD AUTO: 0.05 X10(3)/MCL (ref 0–0.9)
EOSINOPHIL # BLD AUTO: 0.05 X10(3)/MCL (ref 0–0.9)
EOSINOPHIL # BLD AUTO: 0.08 X10(3)/MCL (ref 0–0.9)
EOSINOPHIL # BLD AUTO: 0.1 X10(3)/MCL (ref 0–0.9)
EOSINOPHIL # BLD AUTO: 0.15 X10(3)/MCL (ref 0–0.9)
EOSINOPHIL # BLD AUTO: 0.19 X10(3)/MCL (ref 0–0.9)
EOSINOPHIL NFR BLD AUTO: 0 %
EOSINOPHIL NFR BLD AUTO: 0.3 %
EOSINOPHIL NFR BLD AUTO: 0.3 %
EOSINOPHIL NFR BLD AUTO: 0.4 %
EOSINOPHIL NFR BLD AUTO: 0.9 %
EOSINOPHIL NFR BLD AUTO: 0.9 %
EOSINOPHIL NFR BLD AUTO: 1.1 %
EOSINOPHIL NFR BLD AUTO: 1.5 %
ERYTHROCYTE [DISTWIDTH] IN BLOOD BY AUTOMATED COUNT: 15.6 % (ref 11.5–17)
ERYTHROCYTE [DISTWIDTH] IN BLOOD BY AUTOMATED COUNT: 15.7 % (ref 11.5–17)
ERYTHROCYTE [DISTWIDTH] IN BLOOD BY AUTOMATED COUNT: 15.8 % (ref 11.5–17)
ERYTHROCYTE [DISTWIDTH] IN BLOOD BY AUTOMATED COUNT: 15.8 % (ref 11.5–17)
ERYTHROCYTE [DISTWIDTH] IN BLOOD BY AUTOMATED COUNT: 15.9 % (ref 11.5–17)
ERYTHROCYTE [DISTWIDTH] IN BLOOD BY AUTOMATED COUNT: 16.7 % (ref 11.5–17)
ERYTHROCYTE [DISTWIDTH] IN BLOOD BY AUTOMATED COUNT: 16.9 % (ref 11.5–17)
FOLATE SERPL-MCNC: 6 NG/ML (ref 7–31.4)
FRACTIONAL SHORTENING: 16.4 % (ref 28–44)
GFR SERPLBLD CREATININE-BSD FMLA CKD-EPI: 40 ML/MIN/1.73/M2
GFR SERPLBLD CREATININE-BSD FMLA CKD-EPI: 57 ML/MIN/1.73/M2
GFR SERPLBLD CREATININE-BSD FMLA CKD-EPI: 59 ML/MIN/1.73/M2
GFR SERPLBLD CREATININE-BSD FMLA CKD-EPI: >60 ML/MIN/1.73/M2
GLOBULIN SER-MCNC: 3.2 GM/DL (ref 2.4–3.5)
GLOBULIN SER-MCNC: 3.4 GM/DL (ref 2.4–3.5)
GLOBULIN SER-MCNC: 3.4 GM/DL (ref 2.4–3.5)
GLOBULIN SER-MCNC: 3.7 GM/DL (ref 2.4–3.5)
GLUCOSE SERPL-MCNC: 100 MG/DL (ref 82–115)
GLUCOSE SERPL-MCNC: 107 MG/DL (ref 82–115)
GLUCOSE SERPL-MCNC: 107 MG/DL (ref 82–115)
GLUCOSE SERPL-MCNC: 109 MG/DL (ref 82–115)
GLUCOSE SERPL-MCNC: 116 MG/DL (ref 82–115)
GLUCOSE SERPL-MCNC: 122 MG/DL (ref 82–115)
GLUCOSE SERPL-MCNC: 123 MG/DL (ref 82–115)
GLUCOSE SERPL-MCNC: 132 MG/DL (ref 82–115)
GLUCOSE SERPL-MCNC: 96 MG/DL (ref 82–115)
GLUCOSE UR QL STRIP: ABNORMAL
GROUP & RH: NORMAL
GROUP & RH: NORMAL
HCO3 BLDA-SCNC: 18.5 MMOL/L (ref 22–26)
HCT VFR BLD AUTO: 24.7 % (ref 42–52)
HCT VFR BLD AUTO: 25.1 % (ref 42–52)
HCT VFR BLD AUTO: 30.8 % (ref 42–52)
HCT VFR BLD AUTO: 33.8 % (ref 42–52)
HCT VFR BLD AUTO: 35.5 % (ref 42–52)
HCT VFR BLD AUTO: 38.8 % (ref 42–52)
HCT VFR BLD AUTO: 40.5 % (ref 42–52)
HCT VFR BLD AUTO: 41.8 % (ref 42–52)
HCT VFR BLD AUTO: 41.9 % (ref 42–52)
HCT VFR BLD AUTO: 43 % (ref 42–52)
HGB BLD-MCNC: 11.3 G/DL (ref 14–18)
HGB BLD-MCNC: 11.5 G/DL (ref 14–18)
HGB BLD-MCNC: 12.9 G/DL (ref 14–18)
HGB BLD-MCNC: 13.1 G/DL (ref 14–18)
HGB BLD-MCNC: 13.9 G/DL (ref 14–18)
HGB BLD-MCNC: 14.2 G/DL (ref 14–18)
HGB BLD-MCNC: 14.5 G/DL (ref 14–18)
HGB BLD-MCNC: 8.2 G/DL (ref 14–18)
HGB BLD-MCNC: 8.3 G/DL (ref 14–18)
HGB BLD-MCNC: 9.9 G/DL (ref 14–18)
HGB UR QL STRIP: NEGATIVE
IMM GRANULOCYTES # BLD AUTO: 0.05 X10(3)/MCL (ref 0–0.04)
IMM GRANULOCYTES # BLD AUTO: 0.05 X10(3)/MCL (ref 0–0.04)
IMM GRANULOCYTES # BLD AUTO: 0.06 X10(3)/MCL (ref 0–0.04)
IMM GRANULOCYTES # BLD AUTO: 0.07 X10(3)/MCL (ref 0–0.04)
IMM GRANULOCYTES # BLD AUTO: 0.07 X10(3)/MCL (ref 0–0.04)
IMM GRANULOCYTES # BLD AUTO: 0.08 X10(3)/MCL (ref 0–0.04)
IMM GRANULOCYTES # BLD AUTO: 0.1 X10(3)/MCL (ref 0–0.04)
IMM GRANULOCYTES # BLD AUTO: 0.51 X10(3)/MCL (ref 0–0.04)
IMM GRANULOCYTES NFR BLD AUTO: 0.4 %
IMM GRANULOCYTES NFR BLD AUTO: 0.5 %
IMM GRANULOCYTES NFR BLD AUTO: 0.5 %
IMM GRANULOCYTES NFR BLD AUTO: 0.6 %
IMM GRANULOCYTES NFR BLD AUTO: 3.7 %
INDIRECT COOMBS: NORMAL
INDIRECT COOMBS: NORMAL
INR PPP: 1
INR PPP: 1.2
INSTRUMENT WBC (OLG): 12.61 X10(3)/MCL
INTERVENTRICULAR SEPTUM: 1 CM (ref 0.6–1.1)
IRON SATN MFR SERPL: 6 % (ref 20–50)
IRON SERPL-MCNC: 7 UG/DL (ref 65–175)
KETONES UR QL STRIP: NEGATIVE
LEFT ATRIUM AREA SYSTOLIC (APICAL 2 CHAMBER): 17.2 CM2
LEFT ATRIUM AREA SYSTOLIC (APICAL 4 CHAMBER): 22.3 CM2
LEFT ATRIUM SIZE: 4.4 CM
LEFT ATRIUM VOLUME INDEX MOD: 30 ML/M2
LEFT ATRIUM VOLUME MOD: 59 ML
LEFT CCA DIST DIAS: 0 CM/S
LEFT CCA DIST SYS: 9 CM/S
LEFT CCA PROX DIAS: 0 CM/S
LEFT CCA PROX SYS: 5 CM/S
LEFT ICA DIST SYS: 0 CM/S
LEFT ICA MID SYS: 0 CM/S
LEFT ICA PROX DIAS: 0 CM/S
LEFT ICA PROX SYS: 7 CM/S
LEFT INTERNAL DIMENSION IN SYSTOLE: 4.6 CM (ref 2.1–4)
LEFT VENTRICLE DIASTOLIC VOLUME INDEX: 75 ML/M2
LEFT VENTRICLE DIASTOLIC VOLUME: 147 ML
LEFT VENTRICLE END DIASTOLIC VOLUME APICAL 2 CHAMBER: 85.5 ML
LEFT VENTRICLE END DIASTOLIC VOLUME APICAL 4 CHAMBER: 80.8 ML
LEFT VENTRICLE END SYSTOLIC VOLUME APICAL 2 CHAMBER: 46 ML
LEFT VENTRICLE END SYSTOLIC VOLUME APICAL 4 CHAMBER: 64.7 ML
LEFT VENTRICLE MASS INDEX: 116 G/M2
LEFT VENTRICLE SYSTOLIC VOLUME INDEX: 49.5 ML/M2
LEFT VENTRICLE SYSTOLIC VOLUME: 97 ML
LEFT VENTRICULAR INTERNAL DIMENSION IN DIASTOLE: 5.5 CM (ref 3.5–6)
LEFT VENTRICULAR MASS: 227.4 G
LEUKOCYTE ESTERASE UR QL STRIP: NEGATIVE
LPM (OHS): 15
LV LATERAL E/E' RATIO: 16.5 M/S
LV SEPTAL E/E' RATIO: 19.8 M/S
LVED V (TEICH): 147 ML
LVES V (TEICH): 97.3 ML
LVOT MG: 1 MMHG
LVOT MV: 0.5 CM/S
LYMPHOCYTES # BLD AUTO: 0.4 X10(3)/MCL (ref 0.6–4.6)
LYMPHOCYTES # BLD AUTO: 1.01 X10(3)/MCL (ref 0.6–4.6)
LYMPHOCYTES # BLD AUTO: 1.35 X10(3)/MCL (ref 0.6–4.6)
LYMPHOCYTES # BLD AUTO: 1.49 X10(3)/MCL (ref 0.6–4.6)
LYMPHOCYTES # BLD AUTO: 1.68 X10(3)/MCL (ref 0.6–4.6)
LYMPHOCYTES # BLD AUTO: 1.72 X10(3)/MCL (ref 0.6–4.6)
LYMPHOCYTES # BLD AUTO: 2.53 X10(3)/MCL (ref 0.6–4.6)
LYMPHOCYTES # BLD AUTO: 3.45 X10(3)/MCL (ref 0.6–4.6)
LYMPHOCYTES NFR BLD AUTO: 13 %
LYMPHOCYTES NFR BLD AUTO: 15.1 %
LYMPHOCYTES NFR BLD AUTO: 16.9 %
LYMPHOCYTES NFR BLD AUTO: 2.9 %
LYMPHOCYTES NFR BLD AUTO: 20.1 %
LYMPHOCYTES NFR BLD AUTO: 25.1 %
LYMPHOCYTES NFR BLD AUTO: 6 %
LYMPHOCYTES NFR BLD AUTO: 9.3 %
LYMPHOCYTES NFR BLD MANUAL: 0.38 X10(3)/MCL (ref 0.6–4.6)
LYMPHOCYTES NFR BLD MANUAL: 3 %
MAGNESIUM SERPL-MCNC: 2.2 MG/DL (ref 1.6–2.6)
MAGNESIUM SERPL-MCNC: 2.3 MG/DL (ref 1.6–2.6)
MCH RBC QN AUTO: 26.6 PG (ref 27–31)
MCH RBC QN AUTO: 26.8 PG (ref 27–31)
MCH RBC QN AUTO: 26.9 PG (ref 27–31)
MCH RBC QN AUTO: 26.9 PG (ref 27–31)
MCH RBC QN AUTO: 27.1 PG (ref 27–31)
MCH RBC QN AUTO: 27.2 PG (ref 27–31)
MCH RBC QN AUTO: 27.2 PG (ref 27–31)
MCH RBC QN AUTO: 27.3 PG (ref 27–31)
MCH RBC QN AUTO: 27.4 PG (ref 27–31)
MCHC RBC AUTO-ENTMCNC: 32.3 G/DL (ref 33–36)
MCHC RBC AUTO-ENTMCNC: 32.4 G/DL (ref 33–36)
MCHC RBC AUTO-ENTMCNC: 33.1 G/DL (ref 33–36)
MCHC RBC AUTO-ENTMCNC: 33.2 G/DL (ref 33–36)
MCHC RBC AUTO-ENTMCNC: 33.4 G/DL (ref 33–36)
MCHC RBC AUTO-ENTMCNC: 33.7 G/DL (ref 33–36)
MCHC RBC AUTO-ENTMCNC: 34 G/DL (ref 33–36)
MCV RBC AUTO: 80.2 FL (ref 80–94)
MCV RBC AUTO: 80.4 FL (ref 80–94)
MCV RBC AUTO: 80.7 FL (ref 80–94)
MCV RBC AUTO: 81.2 FL (ref 80–94)
MCV RBC AUTO: 81.7 FL (ref 80–94)
MCV RBC AUTO: 81.8 FL (ref 80–94)
MCV RBC AUTO: 82.1 FL (ref 80–94)
MCV RBC AUTO: 82.2 FL (ref 80–94)
MCV RBC AUTO: 83.2 FL (ref 80–94)
METHGB MFR BLDA: 0.9 % (ref 0.4–1.5)
MONOCYTES # BLD AUTO: 0.79 X10(3)/MCL (ref 0.1–1.3)
MONOCYTES # BLD AUTO: 0.82 X10(3)/MCL (ref 0.1–1.3)
MONOCYTES # BLD AUTO: 1.01 X10(3)/MCL (ref 0.1–1.3)
MONOCYTES # BLD AUTO: 1.11 X10(3)/MCL (ref 0.1–1.3)
MONOCYTES # BLD AUTO: 1.14 X10(3)/MCL (ref 0.1–1.3)
MONOCYTES # BLD AUTO: 1.15 X10(3)/MCL (ref 0.1–1.3)
MONOCYTES # BLD AUTO: 1.38 X10(3)/MCL (ref 0.1–1.3)
MONOCYTES # BLD AUTO: 1.41 X10(3)/MCL (ref 0.1–1.3)
MONOCYTES NFR BLD AUTO: 10.4 %
MONOCYTES NFR BLD AUTO: 10.7 %
MONOCYTES NFR BLD AUTO: 6 %
MONOCYTES NFR BLD AUTO: 6.9 %
MONOCYTES NFR BLD AUTO: 8 %
MONOCYTES NFR BLD AUTO: 8.3 %
MONOCYTES NFR BLD AUTO: 8.8 %
MONOCYTES NFR BLD AUTO: 9.3 %
MONOCYTES NFR BLD MANUAL: 0.63 X10(3)/MCL (ref 0.1–1.3)
MONOCYTES NFR BLD MANUAL: 5 %
MV MEAN GRADIENT: 6 MMHG
MV PEAK A VEL: 1.3 M/S
MV PEAK E VEL: 0.99 M/S
MV PEAK GRADIENT: 10 MMHG
MV VALVE AREA BY CONTINUITY EQUATION: 3.96 CM2
NEUTROPHILS # BLD AUTO: 11.2 X10(3)/MCL (ref 2.1–9.2)
NEUTROPHILS # BLD AUTO: 11.8 X10(3)/MCL (ref 2.1–9.2)
NEUTROPHILS # BLD AUTO: 14.74 X10(3)/MCL (ref 2.1–9.2)
NEUTROPHILS # BLD AUTO: 6.37 X10(3)/MCL (ref 2.1–9.2)
NEUTROPHILS # BLD AUTO: 8.65 X10(3)/MCL (ref 2.1–9.2)
NEUTROPHILS # BLD AUTO: 8.71 X10(3)/MCL (ref 2.1–9.2)
NEUTROPHILS # BLD AUTO: 8.88 X10(3)/MCL (ref 2.1–9.2)
NEUTROPHILS # BLD AUTO: 9.7 X10(3)/MCL (ref 2.1–9.2)
NEUTROPHILS NFR BLD AUTO: 64.6 %
NEUTROPHILS NFR BLD AUTO: 68.6 %
NEUTROPHILS NFR BLD AUTO: 72 %
NEUTROPHILS NFR BLD AUTO: 75.3 %
NEUTROPHILS NFR BLD AUTO: 76.3 %
NEUTROPHILS NFR BLD AUTO: 81.6 %
NEUTROPHILS NFR BLD AUTO: 82.3 %
NEUTROPHILS NFR BLD AUTO: 86.9 %
NEUTROPHILS NFR BLD MANUAL: 93 %
NITRITE UR QL STRIP: NEGATIVE
NRBC BLD AUTO-RTO: 0 %
NRBC BLD AUTO-RTO: 0.2 %
NRBC BLD AUTO-RTO: 0.3 %
O2 HB BLOOD GAS (OHS): 72.6 % (ref 94–97)
OHS CV CAROTID RIGHT ICA EDV HIGHEST: 34
OHS CV CAROTID ULTRASOUND LEFT ICA/CCA RATIO: 0.78
OHS CV CAROTID ULTRASOUND RIGHT ICA/CCA RATIO: 2.87
OHS CV PV CAROTID LEFT HIGHEST CCA: 9
OHS CV PV CAROTID LEFT HIGHEST ICA: 7
OHS CV PV CAROTID RIGHT HIGHEST CCA: 45
OHS CV PV CAROTID RIGHT HIGHEST ICA: 129
OHS CV RV/LV RATIO: 0.38 CM
OHS CV US CAROTID LEFT HIGHEST EDV: 0
OHS LV EJECTION FRACTION SIMPSONS BIPLANE MOD: 39 %
OHS QRS DURATION: 146 MS
OHS QRS DURATION: 152 MS
OHS QTC CALCULATION: 511 MS
OHS QTC CALCULATION: 515 MS
OXYGEN DEVICE BLOOD GAS (OHS): ABNORMAL
OXYHGB MFR BLDA: 6.6 G/DL (ref 12–16)
PCO2 BLDA: 57 MMHG (ref 35–45)
PH BLDA: 7.12 [PH] (ref 7.35–7.45)
PH UR STRIP: 6.5 [PH]
PLATELET # BLD AUTO: 148 X10(3)/MCL (ref 130–400)
PLATELET # BLD AUTO: 198 X10(3)/MCL (ref 130–400)
PLATELET # BLD AUTO: 213 X10(3)/MCL (ref 130–400)
PLATELET # BLD AUTO: 213 X10(3)/MCL (ref 130–400)
PLATELET # BLD AUTO: 221 X10(3)/MCL (ref 130–400)
PLATELET # BLD AUTO: 226 X10(3)/MCL (ref 130–400)
PLATELET # BLD AUTO: 227 X10(3)/MCL (ref 130–400)
PLATELET # BLD AUTO: 229 X10(3)/MCL (ref 130–400)
PLATELET # BLD AUTO: 248 X10(3)/MCL (ref 130–400)
PLATELET # BLD EST: NORMAL 10*3/UL
PMV BLD AUTO: 10.2 FL (ref 7.4–10.4)
PMV BLD AUTO: 10.3 FL (ref 7.4–10.4)
PMV BLD AUTO: 10.5 FL (ref 7.4–10.4)
PMV BLD AUTO: 10.8 FL (ref 7.4–10.4)
PMV BLD AUTO: 10.9 FL (ref 7.4–10.4)
PMV BLD AUTO: 10.9 FL (ref 7.4–10.4)
PMV BLD AUTO: 11.8 FL (ref 7.4–10.4)
PO2 BLDA: 46 MMHG (ref 80–100)
POCT GLUCOSE: 122 MG/DL (ref 70–110)
POCT GLUCOSE: 123 MG/DL (ref 70–110)
POCT GLUCOSE: 214 MG/DL (ref 70–110)
POIKILOCYTOSIS BLD QL SMEAR: ABNORMAL
POTASSIUM BLOOD GAS (OHS): 7 MMOL/L (ref 3.5–5)
POTASSIUM SERPL-SCNC: 3.5 MMOL/L (ref 3.5–5.1)
POTASSIUM SERPL-SCNC: 3.5 MMOL/L (ref 3.5–5.1)
POTASSIUM SERPL-SCNC: 3.7 MMOL/L (ref 3.5–5.1)
POTASSIUM SERPL-SCNC: 3.9 MMOL/L (ref 3.5–5.1)
POTASSIUM SERPL-SCNC: 3.9 MMOL/L (ref 3.5–5.1)
POTASSIUM SERPL-SCNC: 4.2 MMOL/L (ref 3.5–5.1)
POTASSIUM SERPL-SCNC: 4.3 MMOL/L (ref 3.5–5.1)
POTASSIUM SERPL-SCNC: 4.4 MMOL/L (ref 3.5–5.1)
POTASSIUM SERPL-SCNC: 4.7 MMOL/L (ref 3.5–5.1)
PROT SERPL-MCNC: 6.1 GM/DL (ref 5.8–7.6)
PROT SERPL-MCNC: 6.7 GM/DL (ref 5.8–7.6)
PROT SERPL-MCNC: 6.8 GM/DL (ref 5.8–7.6)
PROT SERPL-MCNC: 7.4 GM/DL (ref 5.8–7.6)
PROT UR QL STRIP: ABNORMAL
PROTHROMBIN TIME: 14.2 SECONDS (ref 12.4–14.9)
PROTHROMBIN TIME: 15.7 SECONDS (ref 12.5–14.5)
PSYCHE PATHOLOGY RESULT: NORMAL
RA WIDTH: 3.3 CM
RBC # BLD AUTO: 3.01 X10(6)/MCL (ref 4.7–6.1)
RBC # BLD AUTO: 3.09 X10(6)/MCL (ref 4.7–6.1)
RBC # BLD AUTO: 4.13 X10(6)/MCL (ref 4.7–6.1)
RBC # BLD AUTO: 4.32 X10(6)/MCL (ref 4.7–6.1)
RBC # BLD AUTO: 4.75 X10(6)/MCL (ref 4.7–6.1)
RBC # BLD AUTO: 4.87 X10(6)/MCL (ref 4.7–6.1)
RBC # BLD AUTO: 5.19 X10(6)/MCL (ref 4.7–6.1)
RBC # BLD AUTO: 5.21 X10(6)/MCL (ref 4.7–6.1)
RBC # BLD AUTO: 5.35 X10(6)/MCL (ref 4.7–6.1)
RBC #/AREA URNS AUTO: ABNORMAL /HPF
RBC MORPH BLD: ABNORMAL
RIGHT CCA DIST DIAS: 11 CM/S
RIGHT CCA DIST SYS: 45 CM/S
RIGHT CCA PROX DIAS: 8 CM/S
RIGHT CCA PROX SYS: 45 CM/S
RIGHT ECA DIAS: 7 CM/S
RIGHT ECA SYS: 100 CM/S
RIGHT ICA DIST DIAS: 16 CM/S
RIGHT ICA DIST SYS: 64 CM/S
RIGHT ICA MID DIAS: 34 CM/S
RIGHT ICA MID SYS: 129 CM/S
RIGHT ICA PROX DIAS: 22 CM/S
RIGHT ICA PROX SYS: 99 CM/S
RIGHT VENTRICLE DIASTOLIC BASEL DIMENSION: 2.1 CM
RIGHT VENTRICULAR END-DIASTOLIC DIMENSION: 2.1 CM
RIGHT VERTEBRAL DIAS: 0 CM/S
RIGHT VERTEBRAL SYS: 33 CM/S
SAMPLE SITE BLOOD GAS (OHS): ABNORMAL
SAO2 % BLDA: 64.7 %
SODIUM BLOOD GAS (OHS): 129 MMOL/L (ref 137–145)
SODIUM SERPL-SCNC: 128 MMOL/L (ref 136–145)
SODIUM SERPL-SCNC: 129 MMOL/L (ref 136–145)
SODIUM SERPL-SCNC: 129 MMOL/L (ref 136–145)
SODIUM SERPL-SCNC: 130 MMOL/L (ref 136–145)
SODIUM SERPL-SCNC: 132 MMOL/L (ref 136–145)
SODIUM SERPL-SCNC: 132 MMOL/L (ref 136–145)
SODIUM SERPL-SCNC: 134 MMOL/L (ref 136–145)
SODIUM SERPL-SCNC: 135 MMOL/L (ref 136–145)
SODIUM SERPL-SCNC: 136 MMOL/L (ref 136–145)
SP GR UR STRIP.AUTO: 1.03 (ref 1–1.03)
SPECIMEN OUTDATE: NORMAL
SPECIMEN OUTDATE: NORMAL
SQUAMOUS #/AREA URNS LPF: ABNORMAL /HPF
TARGETS BLD QL SMEAR: ABNORMAL
TDI LATERAL: 0.06 M/S
TDI SEPTAL: 0.05 M/S
TDI: 0.06 M/S
TIBC SERPL-MCNC: 102 UG/DL (ref 60–240)
TIBC SERPL-MCNC: 109 UG/DL (ref 250–450)
TRANSFERRIN SERPL-MCNC: 91 MG/DL (ref 163–344)
TRICUSPID ANNULAR PLANE SYSTOLIC EXCURSION: 1.94 CM
TROPONIN I SERPL-MCNC: 0.34 NG/ML (ref 0–0.04)
TROPONIN I SERPL-MCNC: 0.37 NG/ML (ref 0–0.04)
TROPONIN I SERPL-MCNC: 0.53 NG/ML (ref 0–0.04)
UROBILINOGEN UR STRIP-ACNC: NORMAL
VIT B12 SERPL-MCNC: 272 PG/ML (ref 213–816)
WBC # BLD AUTO: 11.41 X10(3)/MCL (ref 4.5–11.5)
WBC # BLD AUTO: 12.6 X10(3)/MCL (ref 4.5–11.5)
WBC # BLD AUTO: 12.61 X10(3)/MCL (ref 4.5–11.5)
WBC # BLD AUTO: 12.89 X10(3)/MCL (ref 4.5–11.5)
WBC # BLD AUTO: 13.62 X10(3)/MCL (ref 4.5–11.5)
WBC # BLD AUTO: 13.74 X10(3)/MCL (ref 4.5–11.5)
WBC # BLD AUTO: 14.45 X10(3)/MCL (ref 4.5–11.5)
WBC # BLD AUTO: 16.95 X10(3)/MCL (ref 4.5–11.5)
WBC # BLD AUTO: 8.84 X10(3)/MCL (ref 4.5–11.5)
WBC #/AREA URNS AUTO: ABNORMAL /HPF
Z-SCORE OF LEFT VENTRICULAR DIMENSION IN END DIASTOLE: -0.2
Z-SCORE OF LEFT VENTRICULAR DIMENSION IN END SYSTOLE: 2.28

## 2025-01-01 PROCEDURE — 51702 INSERT TEMP BLADDER CATH: CPT

## 2025-01-01 PROCEDURE — 63600175 PHARM REV CODE 636 W HCPCS: Performed by: STUDENT IN AN ORGANIZED HEALTH CARE EDUCATION/TRAINING PROGRAM

## 2025-01-01 PROCEDURE — 21400001 HC TELEMETRY ROOM

## 2025-01-01 PROCEDURE — 84484 ASSAY OF TROPONIN QUANT: CPT | Performed by: EMERGENCY MEDICINE

## 2025-01-01 PROCEDURE — 99024 POSTOP FOLLOW-UP VISIT: CPT | Mod: GC,,, | Performed by: STUDENT IN AN ORGANIZED HEALTH CARE EDUCATION/TRAINING PROGRAM

## 2025-01-01 PROCEDURE — 85025 COMPLETE CBC W/AUTO DIFF WBC: CPT | Performed by: INTERNAL MEDICINE

## 2025-01-01 PROCEDURE — 93005 ELECTROCARDIOGRAM TRACING: CPT

## 2025-01-01 PROCEDURE — 36415 COLL VENOUS BLD VENIPUNCTURE: CPT | Performed by: STUDENT IN AN ORGANIZED HEALTH CARE EDUCATION/TRAINING PROGRAM

## 2025-01-01 PROCEDURE — 88311 DECALCIFY TISSUE: CPT

## 2025-01-01 PROCEDURE — 85730 THROMBOPLASTIN TIME PARTIAL: CPT | Performed by: INTERNAL MEDICINE

## 2025-01-01 PROCEDURE — 25000003 PHARM REV CODE 250: Performed by: STUDENT IN AN ORGANIZED HEALTH CARE EDUCATION/TRAINING PROGRAM

## 2025-01-01 PROCEDURE — 63600175 PHARM REV CODE 636 W HCPCS: Performed by: SURGERY

## 2025-01-01 PROCEDURE — 04CH0ZZ EXTIRPATION OF MATTER FROM RIGHT EXTERNAL ILIAC ARTERY, OPEN APPROACH: ICD-10-PCS | Performed by: SURGERY

## 2025-01-01 PROCEDURE — 0QB90ZZ EXCISION OF LEFT FEMORAL SHAFT, OPEN APPROACH: ICD-10-PCS | Performed by: SURGERY

## 2025-01-01 PROCEDURE — 63600175 PHARM REV CODE 636 W HCPCS

## 2025-01-01 PROCEDURE — 85610 PROTHROMBIN TIME: CPT | Performed by: INTERNAL MEDICINE

## 2025-01-01 PROCEDURE — 96375 TX/PRO/DX INJ NEW DRUG ADDON: CPT

## 2025-01-01 PROCEDURE — 63600175 PHARM REV CODE 636 W HCPCS: Performed by: HOSPITALIST

## 2025-01-01 PROCEDURE — 83735 ASSAY OF MAGNESIUM: CPT | Performed by: INTERNAL MEDICINE

## 2025-01-01 PROCEDURE — 63600175 PHARM REV CODE 636 W HCPCS: Performed by: ANESTHESIOLOGY

## 2025-01-01 PROCEDURE — 04CN0ZZ EXTIRPATION OF MATTER FROM LEFT POPLITEAL ARTERY, OPEN APPROACH: ICD-10-PCS | Performed by: SURGERY

## 2025-01-01 PROCEDURE — 25000003 PHARM REV CODE 250: Performed by: INTERNAL MEDICINE

## 2025-01-01 PROCEDURE — 86850 RBC ANTIBODY SCREEN: CPT | Performed by: STUDENT IN AN ORGANIZED HEALTH CARE EDUCATION/TRAINING PROGRAM

## 2025-01-01 PROCEDURE — 25000003 PHARM REV CODE 250: Performed by: HOSPITALIST

## 2025-01-01 PROCEDURE — 88304 TISSUE EXAM BY PATHOLOGIST: CPT | Performed by: SURGERY

## 2025-01-01 PROCEDURE — 86900 BLOOD TYPING SEROLOGIC ABO: CPT | Performed by: SURGERY

## 2025-01-01 PROCEDURE — 96374 THER/PROPH/DIAG INJ IV PUSH: CPT

## 2025-01-01 PROCEDURE — 51798 US URINE CAPACITY MEASURE: CPT

## 2025-01-01 PROCEDURE — 82803 BLOOD GASES ANY COMBINATION: CPT

## 2025-01-01 PROCEDURE — 36000711: Performed by: SURGERY

## 2025-01-01 PROCEDURE — 85730 THROMBOPLASTIN TIME PARTIAL: CPT | Performed by: STUDENT IN AN ORGANIZED HEALTH CARE EDUCATION/TRAINING PROGRAM

## 2025-01-01 PROCEDURE — 11000001 HC ACUTE MED/SURG PRIVATE ROOM

## 2025-01-01 PROCEDURE — 25000003 PHARM REV CODE 250

## 2025-01-01 PROCEDURE — 80053 COMPREHEN METABOLIC PANEL: CPT | Performed by: EMERGENCY MEDICINE

## 2025-01-01 PROCEDURE — 0QB80ZZ EXCISION OF RIGHT FEMORAL SHAFT, OPEN APPROACH: ICD-10-PCS | Performed by: SURGERY

## 2025-01-01 PROCEDURE — 82550 ASSAY OF CK (CPK): CPT | Performed by: EMERGENCY MEDICINE

## 2025-01-01 PROCEDURE — 80053 COMPREHEN METABOLIC PANEL: CPT | Performed by: INTERNAL MEDICINE

## 2025-01-01 PROCEDURE — 25000003 PHARM REV CODE 250: Performed by: NURSE PRACTITIONER

## 2025-01-01 PROCEDURE — 37000008 HC ANESTHESIA 1ST 15 MINUTES: Performed by: STUDENT IN AN ORGANIZED HEALTH CARE EDUCATION/TRAINING PROGRAM

## 2025-01-01 PROCEDURE — 85025 COMPLETE CBC W/AUTO DIFF WBC: CPT | Performed by: HOSPITALIST

## 2025-01-01 PROCEDURE — 04CC0ZZ EXTIRPATION OF MATTER FROM RIGHT COMMON ILIAC ARTERY, OPEN APPROACH: ICD-10-PCS | Performed by: SURGERY

## 2025-01-01 PROCEDURE — 93010 ELECTROCARDIOGRAM REPORT: CPT | Mod: ,,, | Performed by: INTERNAL MEDICINE

## 2025-01-01 PROCEDURE — 71000039 HC RECOVERY, EACH ADD'L HOUR: Performed by: STUDENT IN AN ORGANIZED HEALTH CARE EDUCATION/TRAINING PROGRAM

## 2025-01-01 PROCEDURE — 04CJ0ZZ EXTIRPATION OF MATTER FROM LEFT EXTERNAL ILIAC ARTERY, OPEN APPROACH: ICD-10-PCS | Performed by: SURGERY

## 2025-01-01 PROCEDURE — 71000033 HC RECOVERY, INTIAL HOUR: Performed by: STUDENT IN AN ORGANIZED HEALTH CARE EDUCATION/TRAINING PROGRAM

## 2025-01-01 PROCEDURE — 88307 TISSUE EXAM BY PATHOLOGIST: CPT | Performed by: STUDENT IN AN ORGANIZED HEALTH CARE EDUCATION/TRAINING PROGRAM

## 2025-01-01 PROCEDURE — 04CQ0ZZ EXTIRPATION OF MATTER FROM LEFT ANTERIOR TIBIAL ARTERY, OPEN APPROACH: ICD-10-PCS | Performed by: SURGERY

## 2025-01-01 PROCEDURE — 84484 ASSAY OF TROPONIN QUANT: CPT | Performed by: HOSPITALIST

## 2025-01-01 PROCEDURE — 36415 COLL VENOUS BLD VENIPUNCTURE: CPT | Performed by: INTERNAL MEDICINE

## 2025-01-01 PROCEDURE — 37000009 HC ANESTHESIA EA ADD 15 MINS: Performed by: SURGERY

## 2025-01-01 PROCEDURE — 36415 COLL VENOUS BLD VENIPUNCTURE: CPT | Performed by: HOSPITALIST

## 2025-01-01 PROCEDURE — 85025 COMPLETE CBC W/AUTO DIFF WBC: CPT | Performed by: EMERGENCY MEDICINE

## 2025-01-01 PROCEDURE — 37000008 HC ANESTHESIA 1ST 15 MINUTES: Performed by: SURGERY

## 2025-01-01 PROCEDURE — 99285 EMERGENCY DEPT VISIT HI MDM: CPT | Mod: 25

## 2025-01-01 PROCEDURE — 80048 BASIC METABOLIC PNL TOTAL CA: CPT | Performed by: HOSPITALIST

## 2025-01-01 PROCEDURE — 96361 HYDRATE IV INFUSION ADD-ON: CPT

## 2025-01-01 PROCEDURE — 85730 THROMBOPLASTIN TIME PARTIAL: CPT | Performed by: HOSPITALIST

## 2025-01-01 PROCEDURE — 84484 ASSAY OF TROPONIN QUANT: CPT | Performed by: STUDENT IN AN ORGANIZED HEALTH CARE EDUCATION/TRAINING PROGRAM

## 2025-01-01 PROCEDURE — 82607 VITAMIN B-12: CPT | Performed by: HOSPITALIST

## 2025-01-01 PROCEDURE — 85610 PROTHROMBIN TIME: CPT | Performed by: EMERGENCY MEDICINE

## 2025-01-01 PROCEDURE — 36000710: Performed by: SURGERY

## 2025-01-01 PROCEDURE — 82746 ASSAY OF FOLIC ACID SERUM: CPT | Performed by: HOSPITALIST

## 2025-01-01 PROCEDURE — 51701 INSERT BLADDER CATHETER: CPT

## 2025-01-01 PROCEDURE — P9047 ALBUMIN (HUMAN), 25%, 50ML: HCPCS

## 2025-01-01 PROCEDURE — 85730 THROMBOPLASTIN TIME PARTIAL: CPT | Performed by: EMERGENCY MEDICINE

## 2025-01-01 PROCEDURE — 63600175 PHARM REV CODE 636 W HCPCS: Performed by: INTERNAL MEDICINE

## 2025-01-01 PROCEDURE — 99285 EMERGENCY DEPT VISIT HI MDM: CPT | Mod: 25,27

## 2025-01-01 PROCEDURE — 36000710: Performed by: STUDENT IN AN ORGANIZED HEALTH CARE EDUCATION/TRAINING PROGRAM

## 2025-01-01 PROCEDURE — 27000221 HC OXYGEN, UP TO 24 HOURS

## 2025-01-01 PROCEDURE — 25500020 PHARM REV CODE 255: Performed by: INTERNAL MEDICINE

## 2025-01-01 PROCEDURE — 5A12012 PERFORMANCE OF CARDIAC OUTPUT, SINGLE, MANUAL: ICD-10-PCS | Performed by: INTERNAL MEDICINE

## 2025-01-01 PROCEDURE — 37000009 HC ANESTHESIA EA ADD 15 MINS: Performed by: STUDENT IN AN ORGANIZED HEALTH CARE EDUCATION/TRAINING PROGRAM

## 2025-01-01 PROCEDURE — 0Y6C0Z2 DETACHMENT AT RIGHT UPPER LEG, MID, OPEN APPROACH: ICD-10-PCS | Performed by: STUDENT IN AN ORGANIZED HEALTH CARE EDUCATION/TRAINING PROGRAM

## 2025-01-01 PROCEDURE — 36415 COLL VENOUS BLD VENIPUNCTURE: CPT | Performed by: SURGERY

## 2025-01-01 PROCEDURE — 63600175 PHARM REV CODE 636 W HCPCS: Performed by: EMERGENCY MEDICINE

## 2025-01-01 PROCEDURE — 71000033 HC RECOVERY, INTIAL HOUR: Performed by: SURGERY

## 2025-01-01 PROCEDURE — 27201423 OPTIME MED/SURG SUP & DEVICES STERILE SUPPLY: Performed by: SURGERY

## 2025-01-01 PROCEDURE — 94760 N-INVAS EAR/PLS OXIMETRY 1: CPT

## 2025-01-01 PROCEDURE — 36000707: Performed by: SURGERY

## 2025-01-01 PROCEDURE — 36600 WITHDRAWAL OF ARTERIAL BLOOD: CPT

## 2025-01-01 PROCEDURE — 27201423 OPTIME MED/SURG SUP & DEVICES STERILE SUPPLY: Performed by: STUDENT IN AN ORGANIZED HEALTH CARE EDUCATION/TRAINING PROGRAM

## 2025-01-01 PROCEDURE — 85014 HEMATOCRIT: CPT | Performed by: HOSPITALIST

## 2025-01-01 PROCEDURE — 93010 ELECTROCARDIOGRAM REPORT: CPT | Mod: ,,, | Performed by: STUDENT IN AN ORGANIZED HEALTH CARE EDUCATION/TRAINING PROGRAM

## 2025-01-01 PROCEDURE — C1757 CATH, THROMBECTOMY/EMBOLECT: HCPCS | Performed by: SURGERY

## 2025-01-01 PROCEDURE — 0Y6D0Z2 DETACHMENT AT LEFT UPPER LEG, MID, OPEN APPROACH: ICD-10-PCS | Performed by: STUDENT IN AN ORGANIZED HEALTH CARE EDUCATION/TRAINING PROGRAM

## 2025-01-01 PROCEDURE — 88307 TISSUE EXAM BY PATHOLOGIST: CPT | Performed by: SURGERY

## 2025-01-01 PROCEDURE — 36000706: Performed by: SURGERY

## 2025-01-01 PROCEDURE — 36000711: Performed by: STUDENT IN AN ORGANIZED HEALTH CARE EDUCATION/TRAINING PROGRAM

## 2025-01-01 PROCEDURE — 04CD0ZZ EXTIRPATION OF MATTER FROM LEFT COMMON ILIAC ARTERY, OPEN APPROACH: ICD-10-PCS | Performed by: SURGERY

## 2025-01-01 PROCEDURE — 83540 ASSAY OF IRON: CPT | Performed by: HOSPITALIST

## 2025-01-01 PROCEDURE — 04CL0ZZ EXTIRPATION OF MATTER FROM LEFT FEMORAL ARTERY, OPEN APPROACH: ICD-10-PCS | Performed by: SURGERY

## 2025-01-01 PROCEDURE — 81001 URINALYSIS AUTO W/SCOPE: CPT | Performed by: STUDENT IN AN ORGANIZED HEALTH CARE EDUCATION/TRAINING PROGRAM

## 2025-01-01 PROCEDURE — 99900031 HC PATIENT EDUCATION (STAT)

## 2025-01-01 PROCEDURE — 27882 AMPUTATION OF LOWER LEG: CPT | Mod: 50,,, | Performed by: STUDENT IN AN ORGANIZED HEALTH CARE EDUCATION/TRAINING PROGRAM

## 2025-01-01 PROCEDURE — 99900035 HC TECH TIME PER 15 MIN (STAT)

## 2025-01-01 RX ORDER — HYDROMORPHONE HYDROCHLORIDE 2 MG/ML
1 INJECTION, SOLUTION INTRAMUSCULAR; INTRAVENOUS; SUBCUTANEOUS ONCE
Status: COMPLETED | OUTPATIENT
Start: 2025-01-01 | End: 2025-01-01

## 2025-01-01 RX ORDER — ONDANSETRON HYDROCHLORIDE 2 MG/ML
4 INJECTION, SOLUTION INTRAVENOUS
Status: COMPLETED | OUTPATIENT
Start: 2025-01-01 | End: 2025-01-01

## 2025-01-01 RX ORDER — LABETALOL HYDROCHLORIDE 5 MG/ML
10 INJECTION, SOLUTION INTRAVENOUS 4 TIMES DAILY PRN
Status: DISCONTINUED | OUTPATIENT
Start: 2025-01-01 | End: 2025-01-01 | Stop reason: HOSPADM

## 2025-01-01 RX ORDER — ONDANSETRON 4 MG/1
8 TABLET, ORALLY DISINTEGRATING ORAL EVERY 8 HOURS PRN
Status: DISCONTINUED | OUTPATIENT
Start: 2025-01-01 | End: 2025-01-01 | Stop reason: HOSPADM

## 2025-01-01 RX ORDER — SODIUM BICARBONATE 1 MEQ/ML
SYRINGE (ML) INTRAVENOUS CODE/TRAUMA/SEDATION MEDICATION
Status: COMPLETED | OUTPATIENT
Start: 2025-01-01 | End: 2025-01-01

## 2025-01-01 RX ORDER — ACETAMINOPHEN 325 MG/1
650 TABLET ORAL EVERY 4 HOURS PRN
Status: DISCONTINUED | OUTPATIENT
Start: 2025-01-01 | End: 2025-01-01 | Stop reason: HOSPADM

## 2025-01-01 RX ORDER — GLYCOPYRROLATE 0.2 MG/ML
INJECTION INTRAMUSCULAR; INTRAVENOUS
Status: DISCONTINUED | OUTPATIENT
Start: 2025-01-01 | End: 2025-01-01

## 2025-01-01 RX ORDER — CEFAZOLIN SODIUM 1 G/3ML
INJECTION, POWDER, FOR SOLUTION INTRAMUSCULAR; INTRAVENOUS
Status: DISCONTINUED | OUTPATIENT
Start: 2025-01-01 | End: 2025-01-01 | Stop reason: HOSPADM

## 2025-01-01 RX ORDER — HEPARIN SODIUM,PORCINE/D5W 25000/250
0-40 INTRAVENOUS SOLUTION INTRAVENOUS CONTINUOUS
Status: DISCONTINUED | OUTPATIENT
Start: 2025-01-01 | End: 2025-01-01 | Stop reason: HOSPADM

## 2025-01-01 RX ORDER — GABAPENTIN 300 MG/1
300 CAPSULE ORAL 3 TIMES DAILY
Status: DISCONTINUED | OUTPATIENT
Start: 2025-01-01 | End: 2025-01-01

## 2025-01-01 RX ORDER — ETOMIDATE 2 MG/ML
INJECTION INTRAVENOUS
Status: DISCONTINUED | OUTPATIENT
Start: 2025-01-01 | End: 2025-01-01

## 2025-01-01 RX ORDER — CEFAZOLIN 2 G/1
2 INJECTION, POWDER, FOR SOLUTION INTRAMUSCULAR; INTRAVENOUS
Status: COMPLETED | OUTPATIENT
Start: 2025-01-01 | End: 2025-01-01

## 2025-01-01 RX ORDER — FENTANYL CITRATE 50 UG/ML
INJECTION, SOLUTION INTRAMUSCULAR; INTRAVENOUS
Status: DISCONTINUED | OUTPATIENT
Start: 2025-01-01 | End: 2025-01-01

## 2025-01-01 RX ORDER — PROPOFOL 10 MG/ML
VIAL (ML) INTRAVENOUS
Status: DISCONTINUED | OUTPATIENT
Start: 2025-01-01 | End: 2025-01-01

## 2025-01-01 RX ORDER — ATORVASTATIN CALCIUM 40 MG/1
80 TABLET, FILM COATED ORAL DAILY
Status: DISCONTINUED | OUTPATIENT
Start: 2025-01-01 | End: 2025-01-01 | Stop reason: HOSPADM

## 2025-01-01 RX ORDER — LIDOCAINE HYDROCHLORIDE 20 MG/ML
INJECTION INTRAVENOUS
Status: DISCONTINUED | OUTPATIENT
Start: 2025-01-01 | End: 2025-01-01

## 2025-01-01 RX ORDER — HYDROMORPHONE HYDROCHLORIDE 2 MG/ML
0.2 INJECTION, SOLUTION INTRAMUSCULAR; INTRAVENOUS; SUBCUTANEOUS EVERY 5 MIN PRN
Status: DISCONTINUED | OUTPATIENT
Start: 2025-01-01 | End: 2025-01-01 | Stop reason: HOSPADM

## 2025-01-01 RX ORDER — IBUPROFEN 200 MG
16 TABLET ORAL
Status: DISCONTINUED | OUTPATIENT
Start: 2025-01-01 | End: 2025-01-01 | Stop reason: HOSPADM

## 2025-01-01 RX ORDER — HYDROMORPHONE HYDROCHLORIDE 2 MG/ML
1 INJECTION, SOLUTION INTRAMUSCULAR; INTRAVENOUS; SUBCUTANEOUS EVERY 6 HOURS PRN
Status: DISCONTINUED | OUTPATIENT
Start: 2025-01-01 | End: 2025-01-01

## 2025-01-01 RX ORDER — HYDROMORPHONE HYDROCHLORIDE 2 MG/ML
0.4 INJECTION, SOLUTION INTRAMUSCULAR; INTRAVENOUS; SUBCUTANEOUS EVERY 5 MIN PRN
Status: DISCONTINUED | OUTPATIENT
Start: 2025-01-01 | End: 2025-01-01 | Stop reason: HOSPADM

## 2025-01-01 RX ORDER — ONDANSETRON HYDROCHLORIDE 2 MG/ML
4 INJECTION, SOLUTION INTRAVENOUS EVERY 8 HOURS PRN
Status: DISCONTINUED | OUTPATIENT
Start: 2025-01-01 | End: 2025-01-01 | Stop reason: HOSPADM

## 2025-01-01 RX ORDER — HYDROMORPHONE HYDROCHLORIDE 2 MG/1
2 TABLET ORAL EVERY 4 HOURS PRN
Refills: 0 | Status: DISCONTINUED | OUTPATIENT
Start: 2025-01-01 | End: 2025-01-01

## 2025-01-01 RX ORDER — MORPHINE SULFATE 4 MG/ML
4 INJECTION, SOLUTION INTRAMUSCULAR; INTRAVENOUS
Refills: 0 | Status: COMPLETED | OUTPATIENT
Start: 2025-01-01 | End: 2025-01-01

## 2025-01-01 RX ORDER — POLYETHYLENE GLYCOL 3350 17 G/17G
17 POWDER, FOR SOLUTION ORAL 3 TIMES DAILY PRN
Status: DISCONTINUED | OUTPATIENT
Start: 2025-01-01 | End: 2025-01-01 | Stop reason: HOSPADM

## 2025-01-01 RX ORDER — GLUCAGON 1 MG
1 KIT INJECTION
Status: DISCONTINUED | OUTPATIENT
Start: 2025-01-01 | End: 2025-01-01 | Stop reason: HOSPADM

## 2025-01-01 RX ORDER — NALOXONE HCL 0.4 MG/ML
0.02 VIAL (ML) INJECTION
Status: DISCONTINUED | OUTPATIENT
Start: 2025-01-01 | End: 2025-01-01 | Stop reason: HOSPADM

## 2025-01-01 RX ORDER — SODIUM CHLORIDE 0.9 % (FLUSH) 0.9 %
10 SYRINGE (ML) INJECTION
Status: DISCONTINUED | OUTPATIENT
Start: 2025-01-01 | End: 2025-01-01 | Stop reason: HOSPADM

## 2025-01-01 RX ORDER — INSULIN ASPART 100 [IU]/ML
0-5 INJECTION, SOLUTION INTRAVENOUS; SUBCUTANEOUS EVERY 4 HOURS PRN
Status: CANCELLED | OUTPATIENT
Start: 2025-01-01

## 2025-01-01 RX ORDER — HYDROMORPHONE HYDROCHLORIDE 2 MG/ML
2 INJECTION, SOLUTION INTRAMUSCULAR; INTRAVENOUS; SUBCUTANEOUS EVERY 6 HOURS PRN
Status: DISCONTINUED | OUTPATIENT
Start: 2025-01-01 | End: 2025-01-01

## 2025-01-01 RX ORDER — MAGNESIUM SULFATE HEPTAHYDRATE 40 MG/ML
2 INJECTION, SOLUTION INTRAVENOUS ONCE
Status: COMPLETED | OUTPATIENT
Start: 2025-01-01 | End: 2025-01-01

## 2025-01-01 RX ORDER — SODIUM CITRATE AND CITRIC ACID MONOHYDRATE 334; 500 MG/5ML; MG/5ML
30 SOLUTION ORAL
Status: CANCELLED | OUTPATIENT
Start: 2025-01-01

## 2025-01-01 RX ORDER — EPHEDRINE SULFATE 50 MG/ML
INJECTION, SOLUTION INTRAVENOUS
Status: DISCONTINUED | OUTPATIENT
Start: 2025-01-01 | End: 2025-01-01

## 2025-01-01 RX ORDER — ALUMINUM HYDROXIDE, MAGNESIUM HYDROXIDE, AND SIMETHICONE 1200; 120; 1200 MG/30ML; MG/30ML; MG/30ML
30 SUSPENSION ORAL 4 TIMES DAILY PRN
Status: DISCONTINUED | OUTPATIENT
Start: 2025-01-01 | End: 2025-01-01 | Stop reason: HOSPADM

## 2025-01-01 RX ORDER — ROCURONIUM BROMIDE 10 MG/ML
INJECTION, SOLUTION INTRAVENOUS
Status: DISCONTINUED | OUTPATIENT
Start: 2025-01-01 | End: 2025-01-01

## 2025-01-01 RX ORDER — ELECTROLYTES/DEXTROSE
400 SOLUTION, ORAL ORAL
Status: DISCONTINUED | OUTPATIENT
Start: 2025-01-01 | End: 2025-01-01 | Stop reason: HOSPADM

## 2025-01-01 RX ORDER — DEXAMETHASONE SODIUM PHOSPHATE 4 MG/ML
INJECTION, SOLUTION INTRA-ARTICULAR; INTRALESIONAL; INTRAMUSCULAR; INTRAVENOUS; SOFT TISSUE
Status: DISCONTINUED | OUTPATIENT
Start: 2025-01-01 | End: 2025-01-01

## 2025-01-01 RX ORDER — HYDROCODONE BITARTRATE AND ACETAMINOPHEN 10; 325 MG/1; MG/1
1 TABLET ORAL EVERY 6 HOURS PRN
Refills: 0 | Status: DISCONTINUED | OUTPATIENT
Start: 2025-01-01 | End: 2025-01-01

## 2025-01-01 RX ORDER — AMITRIPTYLINE HYDROCHLORIDE 25 MG/1
25 TABLET, FILM COATED ORAL NIGHTLY
Status: DISCONTINUED | OUTPATIENT
Start: 2025-01-01 | End: 2025-01-01 | Stop reason: HOSPADM

## 2025-01-01 RX ORDER — BISACODYL 10 MG/1
10 SUPPOSITORY RECTAL DAILY PRN
Status: DISCONTINUED | OUTPATIENT
Start: 2025-01-01 | End: 2025-01-01 | Stop reason: HOSPADM

## 2025-01-01 RX ORDER — CALCIUM CHLORIDE INJECTION 100 MG/ML
INJECTION, SOLUTION INTRAVENOUS CODE/TRAUMA/SEDATION MEDICATION
Status: COMPLETED | OUTPATIENT
Start: 2025-01-01 | End: 2025-01-01

## 2025-01-01 RX ORDER — VASOPRESSIN 20 [USP'U]/ML
INJECTION, SOLUTION INTRAMUSCULAR; SUBCUTANEOUS
Status: DISCONTINUED | OUTPATIENT
Start: 2025-01-01 | End: 2025-01-01

## 2025-01-01 RX ORDER — EPINEPHRINE 0.1 MG/ML
INJECTION INTRAVENOUS CODE/TRAUMA/SEDATION MEDICATION
Status: COMPLETED | OUTPATIENT
Start: 2025-01-01 | End: 2025-01-01

## 2025-01-01 RX ORDER — AMMONIUM LACTATE 12 G/100G
LOTION TOPICAL 2 TIMES DAILY PRN
Status: DISCONTINUED | OUTPATIENT
Start: 2025-01-01 | End: 2025-01-01 | Stop reason: HOSPADM

## 2025-01-01 RX ORDER — NIFEDIPINE 30 MG/1
30 TABLET, EXTENDED RELEASE ORAL DAILY
Status: DISCONTINUED | OUTPATIENT
Start: 2025-01-01 | End: 2025-01-01 | Stop reason: HOSPADM

## 2025-01-01 RX ORDER — METHOCARBAMOL 500 MG/1
500 TABLET, FILM COATED ORAL 4 TIMES DAILY
Status: DISCONTINUED | OUTPATIENT
Start: 2025-01-01 | End: 2025-01-01 | Stop reason: HOSPADM

## 2025-01-01 RX ORDER — LIDOCAINE HYDROCHLORIDE 10 MG/ML
1 INJECTION, SOLUTION EPIDURAL; INFILTRATION; INTRACAUDAL; PERINEURAL ONCE
Status: CANCELLED | OUTPATIENT
Start: 2025-01-01 | End: 2025-01-01

## 2025-01-01 RX ORDER — HEPARIN SODIUM,PORCINE/D5W 25000/250
0-40 INTRAVENOUS SOLUTION INTRAVENOUS CONTINUOUS
Status: DISPENSED | OUTPATIENT
Start: 2025-01-01 | End: 2025-01-01

## 2025-01-01 RX ORDER — HYDROMORPHONE HYDROCHLORIDE 2 MG/ML
2 INJECTION, SOLUTION INTRAMUSCULAR; INTRAVENOUS; SUBCUTANEOUS EVERY 4 HOURS PRN
Status: DISCONTINUED | OUTPATIENT
Start: 2025-01-01 | End: 2025-01-01

## 2025-01-01 RX ORDER — HYDROCODONE BITARTRATE AND ACETAMINOPHEN 5; 325 MG/1; MG/1
1 TABLET ORAL EVERY 6 HOURS PRN
Refills: 0 | Status: DISCONTINUED | OUTPATIENT
Start: 2025-01-01 | End: 2025-01-01

## 2025-01-01 RX ORDER — ALPRAZOLAM 0.5 MG/1
0.5 TABLET ORAL 3 TIMES DAILY PRN
Status: DISCONTINUED | OUTPATIENT
Start: 2025-01-01 | End: 2025-01-01 | Stop reason: HOSPADM

## 2025-01-01 RX ORDER — MORPHINE SULFATE 4 MG/ML
2 INJECTION, SOLUTION INTRAMUSCULAR; INTRAVENOUS EVERY 6 HOURS PRN
Refills: 0 | Status: DISCONTINUED | OUTPATIENT
Start: 2025-01-01 | End: 2025-01-01

## 2025-01-01 RX ORDER — ACETAMINOPHEN 10 MG/ML
1000 INJECTION, SOLUTION INTRAVENOUS ONCE
Status: COMPLETED | OUTPATIENT
Start: 2025-01-01 | End: 2025-01-01

## 2025-01-01 RX ORDER — ONDANSETRON HYDROCHLORIDE 2 MG/ML
INJECTION, SOLUTION INTRAVENOUS
Status: DISCONTINUED | OUTPATIENT
Start: 2025-01-01 | End: 2025-01-01

## 2025-01-01 RX ORDER — SODIUM CHLORIDE, SODIUM GLUCONATE, SODIUM ACETATE, POTASSIUM CHLORIDE AND MAGNESIUM CHLORIDE 30; 37; 368; 526; 502 MG/100ML; MG/100ML; MG/100ML; MG/100ML; MG/100ML
INJECTION, SOLUTION INTRAVENOUS CONTINUOUS
Status: CANCELLED | OUTPATIENT
Start: 2025-01-01 | End: 2025-05-07

## 2025-01-01 RX ORDER — CEFAZOLIN SODIUM 1 G/3ML
INJECTION, POWDER, FOR SOLUTION INTRAMUSCULAR; INTRAVENOUS
Status: DISCONTINUED | OUTPATIENT
Start: 2025-01-01 | End: 2025-01-01

## 2025-01-01 RX ORDER — HEPARIN SODIUM 5000 [USP'U]/ML
INJECTION, SOLUTION INTRAVENOUS; SUBCUTANEOUS
Status: DISCONTINUED | OUTPATIENT
Start: 2025-01-01 | End: 2025-01-01 | Stop reason: HOSPADM

## 2025-01-01 RX ORDER — ALBUMIN HUMAN 250 G/1000ML
SOLUTION INTRAVENOUS
Status: DISCONTINUED | OUTPATIENT
Start: 2025-01-01 | End: 2025-01-01

## 2025-01-01 RX ORDER — GABAPENTIN 300 MG/1
300 CAPSULE ORAL 2 TIMES DAILY
Status: DISCONTINUED | OUTPATIENT
Start: 2025-01-01 | End: 2025-01-01 | Stop reason: HOSPADM

## 2025-01-01 RX ORDER — HYDRALAZINE HYDROCHLORIDE 20 MG/ML
10 INJECTION INTRAMUSCULAR; INTRAVENOUS
Status: DISCONTINUED | OUTPATIENT
Start: 2025-01-01 | End: 2025-01-01 | Stop reason: HOSPADM

## 2025-01-01 RX ORDER — CALCIUM CHLORIDE INJECTION 100 MG/ML
INJECTION, SOLUTION INTRAVENOUS
Status: DISCONTINUED | OUTPATIENT
Start: 2025-01-01 | End: 2025-01-01

## 2025-01-01 RX ORDER — TALC
9 POWDER (GRAM) TOPICAL NIGHTLY PRN
Status: DISCONTINUED | OUTPATIENT
Start: 2025-01-01 | End: 2025-01-01 | Stop reason: HOSPADM

## 2025-01-01 RX ORDER — IPRATROPIUM BROMIDE AND ALBUTEROL SULFATE 2.5; .5 MG/3ML; MG/3ML
3 SOLUTION RESPIRATORY (INHALATION) EVERY 6 HOURS PRN
Status: DISCONTINUED | OUTPATIENT
Start: 2025-01-01 | End: 2025-01-01 | Stop reason: HOSPADM

## 2025-01-01 RX ORDER — SIMETHICONE 80 MG
1 TABLET,CHEWABLE ORAL 4 TIMES DAILY PRN
Status: DISCONTINUED | OUTPATIENT
Start: 2025-01-01 | End: 2025-01-01 | Stop reason: HOSPADM

## 2025-01-01 RX ORDER — SODIUM CHLORIDE 9 MG/ML
INJECTION, SOLUTION INTRAVENOUS ONCE
Status: COMPLETED | OUTPATIENT
Start: 2025-01-01 | End: 2025-01-01

## 2025-01-01 RX ORDER — SODIUM CHLORIDE 9 MG/ML
INJECTION, SOLUTION INTRAVENOUS CONTINUOUS
Status: DISCONTINUED | OUTPATIENT
Start: 2025-01-01 | End: 2025-01-01

## 2025-01-01 RX ORDER — MEPERIDINE HYDROCHLORIDE 25 MG/ML
12.5 INJECTION INTRAMUSCULAR; INTRAVENOUS; SUBCUTANEOUS ONCE AS NEEDED
Status: DISCONTINUED | OUTPATIENT
Start: 2025-01-01 | End: 2025-01-01 | Stop reason: HOSPADM

## 2025-01-01 RX ORDER — CARVEDILOL 3.12 MG/1
6.25 TABLET ORAL 2 TIMES DAILY
Status: DISCONTINUED | OUTPATIENT
Start: 2025-01-01 | End: 2025-01-01 | Stop reason: HOSPADM

## 2025-01-01 RX ORDER — CEFAZOLIN SODIUM 2 G/50ML
2 SOLUTION INTRAVENOUS ONCE
Status: COMPLETED | OUTPATIENT
Start: 2025-01-01 | End: 2025-01-01

## 2025-01-01 RX ORDER — PROCHLORPERAZINE EDISYLATE 5 MG/ML
5 INJECTION INTRAMUSCULAR; INTRAVENOUS EVERY 30 MIN PRN
Status: DISCONTINUED | OUTPATIENT
Start: 2025-01-01 | End: 2025-01-01 | Stop reason: HOSPADM

## 2025-01-01 RX ORDER — CEFAZOLIN SODIUM 2 G/50ML
2 SOLUTION INTRAVENOUS
Status: DISCONTINUED | OUTPATIENT
Start: 2025-01-01 | End: 2025-01-01

## 2025-01-01 RX ORDER — METHOCARBAMOL 100 MG/ML
1000 INJECTION, SOLUTION INTRAMUSCULAR; INTRAVENOUS ONCE
Status: COMPLETED | OUTPATIENT
Start: 2025-01-01 | End: 2025-01-01

## 2025-01-01 RX ORDER — IBUPROFEN 200 MG
24 TABLET ORAL
Status: DISCONTINUED | OUTPATIENT
Start: 2025-01-01 | End: 2025-01-01 | Stop reason: HOSPADM

## 2025-01-01 RX ORDER — HYDRALAZINE HYDROCHLORIDE 20 MG/ML
10 INJECTION INTRAMUSCULAR; INTRAVENOUS EVERY 4 HOURS PRN
Status: DISCONTINUED | OUTPATIENT
Start: 2025-01-01 | End: 2025-01-01 | Stop reason: HOSPADM

## 2025-01-01 RX ORDER — ENOXAPARIN SODIUM 100 MG/ML
40 INJECTION SUBCUTANEOUS EVERY 24 HOURS
Status: DISCONTINUED | OUTPATIENT
Start: 2025-01-01 | End: 2025-01-01 | Stop reason: HOSPADM

## 2025-01-01 RX ORDER — IBUPROFEN 200 MG
1 TABLET ORAL DAILY PRN
Status: DISCONTINUED | OUTPATIENT
Start: 2025-01-01 | End: 2025-01-01 | Stop reason: HOSPADM

## 2025-01-01 RX ORDER — OXYCODONE HYDROCHLORIDE 10 MG/1
30 TABLET ORAL EVERY 4 HOURS PRN
Refills: 0 | Status: DISCONTINUED | OUTPATIENT
Start: 2025-01-01 | End: 2025-01-01 | Stop reason: HOSPADM

## 2025-01-01 RX ORDER — MUPIROCIN 20 MG/G
OINTMENT TOPICAL 2 TIMES DAILY
Status: DISCONTINUED | OUTPATIENT
Start: 2025-01-01 | End: 2025-01-01 | Stop reason: HOSPADM

## 2025-01-01 RX ORDER — HYDROMORPHONE HYDROCHLORIDE 2 MG/ML
0.5 INJECTION, SOLUTION INTRAMUSCULAR; INTRAVENOUS; SUBCUTANEOUS ONCE
Status: COMPLETED | OUTPATIENT
Start: 2025-01-01 | End: 2025-01-01

## 2025-01-01 RX ADMIN — OXYCODONE HYDROCHLORIDE 15 MG: 5 TABLET ORAL at 02:04

## 2025-01-01 RX ADMIN — HYDROMORPHONE HYDROCHLORIDE 0.2 MG: 2 INJECTION, SOLUTION INTRAMUSCULAR; INTRAVENOUS; SUBCUTANEOUS at 02:04

## 2025-01-01 RX ADMIN — MUPIROCIN: 20 OINTMENT TOPICAL at 09:04

## 2025-01-01 RX ADMIN — HYDROMORPHONE HYDROCHLORIDE 2 MG: 2 INJECTION INTRAMUSCULAR; INTRAVENOUS; SUBCUTANEOUS at 05:04

## 2025-01-01 RX ADMIN — OXYCODONE HYDROCHLORIDE 30 MG: 10 TABLET ORAL at 12:04

## 2025-01-01 RX ADMIN — METHOCARBAMOL 500 MG: 500 TABLET ORAL at 08:04

## 2025-01-01 RX ADMIN — GABAPENTIN 300 MG: 300 CAPSULE ORAL at 09:04

## 2025-01-01 RX ADMIN — ATORVASTATIN CALCIUM 80 MG: 40 TABLET, FILM COATED ORAL at 08:04

## 2025-01-01 RX ADMIN — ONDANSETRON 4 MG: 2 INJECTION INTRAMUSCULAR; INTRAVENOUS at 01:04

## 2025-01-01 RX ADMIN — ALPRAZOLAM 0.5 MG: 0.5 TABLET ORAL at 12:04

## 2025-01-01 RX ADMIN — FENTANYL CITRATE 50 MCG: 50 INJECTION, SOLUTION INTRAMUSCULAR; INTRAVENOUS at 10:04

## 2025-01-01 RX ADMIN — SACUBITRIL AND VALSARTAN 1 TABLET: 24; 26 TABLET, FILM COATED ORAL at 08:04

## 2025-01-01 RX ADMIN — METHOCARBAMOL 500 MG: 500 TABLET ORAL at 09:04

## 2025-01-01 RX ADMIN — ATORVASTATIN CALCIUM 80 MG: 40 TABLET, FILM COATED ORAL at 09:04

## 2025-01-01 RX ADMIN — Medication 400 ML: at 09:04

## 2025-01-01 RX ADMIN — HEPARIN SODIUM 18 UNITS/KG/HR: 10000 INJECTION, SOLUTION INTRAVENOUS at 04:04

## 2025-01-01 RX ADMIN — Medication 400 ML: at 12:04

## 2025-01-01 RX ADMIN — MUPIROCIN: 20 OINTMENT TOPICAL at 10:04

## 2025-01-01 RX ADMIN — ROCURONIUM BROMIDE 10 MG: 10 SOLUTION INTRAVENOUS at 09:04

## 2025-01-01 RX ADMIN — HYDROMORPHONE HYDROCHLORIDE 0.4 MG: 2 INJECTION, SOLUTION INTRAMUSCULAR; INTRAVENOUS; SUBCUTANEOUS at 12:04

## 2025-01-01 RX ADMIN — HYDROCODONE BITARTRATE AND ACETAMINOPHEN 1 TABLET: 5; 325 TABLET ORAL at 05:04

## 2025-01-01 RX ADMIN — NIFEDIPINE 30 MG: 30 TABLET, FILM COATED, EXTENDED RELEASE ORAL at 08:04

## 2025-01-01 RX ADMIN — HYDROCODONE BITARTRATE AND ACETAMINOPHEN 1 TABLET: 10; 325 TABLET ORAL at 12:04

## 2025-01-01 RX ADMIN — CARVEDILOL 6.25 MG: 3.12 TABLET, FILM COATED ORAL at 09:04

## 2025-01-01 RX ADMIN — MAGNESIUM SULFATE HEPTAHYDRATE 2 G: 40 INJECTION, SOLUTION INTRAVENOUS at 05:04

## 2025-01-01 RX ADMIN — SODIUM CHLORIDE, POTASSIUM CHLORIDE, SODIUM LACTATE AND CALCIUM CHLORIDE 1000 ML: 600; 310; 30; 20 INJECTION, SOLUTION INTRAVENOUS at 06:04

## 2025-01-01 RX ADMIN — GABAPENTIN 300 MG: 300 CAPSULE ORAL at 04:04

## 2025-01-01 RX ADMIN — VASOPRESSIN 1 UNITS: 20 INJECTION INTRAVENOUS at 08:04

## 2025-01-01 RX ADMIN — HYDRALAZINE HYDROCHLORIDE 10 MG: 20 INJECTION INTRAMUSCULAR; INTRAVENOUS at 06:04

## 2025-01-01 RX ADMIN — HYDRALAZINE HYDROCHLORIDE 10 MG: 20 INJECTION INTRAMUSCULAR; INTRAVENOUS at 04:04

## 2025-01-01 RX ADMIN — HYDROMORPHONE HYDROCHLORIDE 2 MG: 2 TABLET ORAL at 01:04

## 2025-01-01 RX ADMIN — MORPHINE SULFATE 4 MG: 4 INJECTION INTRAVENOUS at 06:04

## 2025-01-01 RX ADMIN — CALCIUM CHLORIDE INJECTION 1 G: 100 INJECTION, SOLUTION INTRAVENOUS at 07:04

## 2025-01-01 RX ADMIN — HYDROMORPHONE HYDROCHLORIDE 0.4 MG: 2 INJECTION INTRAMUSCULAR; INTRAVENOUS; SUBCUTANEOUS at 11:04

## 2025-01-01 RX ADMIN — HYDROMORPHONE HYDROCHLORIDE 1 MG: 2 INJECTION INTRAMUSCULAR; INTRAVENOUS; SUBCUTANEOUS at 07:04

## 2025-01-01 RX ADMIN — POLYETHYLENE GLYCOL 3350 17 G: 17 POWDER, FOR SOLUTION ORAL at 08:04

## 2025-01-01 RX ADMIN — ACETAMINOPHEN 1000 MG: 10 INJECTION, SOLUTION INTRAVENOUS at 01:04

## 2025-01-01 RX ADMIN — CARVEDILOL 6.25 MG: 3.12 TABLET, FILM COATED ORAL at 05:04

## 2025-01-01 RX ADMIN — HYDROMORPHONE HYDROCHLORIDE 1 MG: 2 INJECTION INTRAMUSCULAR; INTRAVENOUS; SUBCUTANEOUS at 04:04

## 2025-01-01 RX ADMIN — HEPARIN SODIUM 22 UNITS/KG/HR: 10000 INJECTION, SOLUTION INTRAVENOUS at 06:04

## 2025-01-01 RX ADMIN — Medication 400 ML: at 05:04

## 2025-01-01 RX ADMIN — HYDROMORPHONE HYDROCHLORIDE 1 MG: 2 INJECTION INTRAMUSCULAR; INTRAVENOUS; SUBCUTANEOUS at 05:04

## 2025-01-01 RX ADMIN — ROCURONIUM BROMIDE 20 MG: 10 SOLUTION INTRAVENOUS at 08:04

## 2025-01-01 RX ADMIN — HEPARIN SODIUM 18 UNITS/KG/HR: 10000 INJECTION, SOLUTION INTRAVENOUS at 07:04

## 2025-01-01 RX ADMIN — SODIUM BICARBONATE 50 MEQ: 84 INJECTION INTRAVENOUS at 01:04

## 2025-01-01 RX ADMIN — ALBUMIN (HUMAN) 100 ML: 12.5 SOLUTION INTRAVENOUS at 07:04

## 2025-01-01 RX ADMIN — HEPARIN SODIUM 20 UNITS/KG/HR: 10000 INJECTION, SOLUTION INTRAVENOUS at 03:04

## 2025-01-01 RX ADMIN — SACUBITRIL AND VALSARTAN 1 TABLET: 24; 26 TABLET, FILM COATED ORAL at 09:04

## 2025-01-01 RX ADMIN — EPINEPHRINE 1 MG: 0.1 INJECTION INTRAVENOUS at 01:04

## 2025-01-01 RX ADMIN — MUPIROCIN: 20 OINTMENT TOPICAL at 08:04

## 2025-01-01 RX ADMIN — METHOCARBAMOL 500 MG: 500 TABLET ORAL at 05:04

## 2025-01-01 RX ADMIN — ETOMIDATE 15 MG: 2 INJECTION INTRAVENOUS at 07:04

## 2025-01-01 RX ADMIN — CEFAZOLIN 2 G: 330 INJECTION, POWDER, FOR SOLUTION INTRAMUSCULAR; INTRAVENOUS at 07:04

## 2025-01-01 RX ADMIN — ALBUMIN (HUMAN) 100 ML: 12.5 SOLUTION INTRAVENOUS at 08:04

## 2025-01-01 RX ADMIN — HYDROMORPHONE HYDROCHLORIDE 1 MG: 2 INJECTION INTRAMUSCULAR; INTRAVENOUS; SUBCUTANEOUS at 01:04

## 2025-01-01 RX ADMIN — HYDROMORPHONE HYDROCHLORIDE 0.5 MG: 2 INJECTION INTRAMUSCULAR; INTRAVENOUS; SUBCUTANEOUS at 03:04

## 2025-01-01 RX ADMIN — OXYCODONE HYDROCHLORIDE 15 MG: 5 TABLET ORAL at 11:04

## 2025-01-01 RX ADMIN — METHOCARBAMOL 500 MG: 500 TABLET ORAL at 04:04

## 2025-01-01 RX ADMIN — SACUBITRIL AND VALSARTAN 1 TABLET: 24; 26 TABLET, FILM COATED ORAL at 12:04

## 2025-01-01 RX ADMIN — HYDROMORPHONE HYDROCHLORIDE 1 MG: 2 INJECTION INTRAMUSCULAR; INTRAVENOUS; SUBCUTANEOUS at 09:04

## 2025-01-01 RX ADMIN — CARVEDILOL 6.25 MG: 3.12 TABLET, FILM COATED ORAL at 08:04

## 2025-01-01 RX ADMIN — NALOXONE HYDROCHLORIDE 0.02 MG: 0.4 INJECTION, SOLUTION INTRAMUSCULAR; INTRAVENOUS; SUBCUTANEOUS at 12:04

## 2025-01-01 RX ADMIN — ALPRAZOLAM 0.5 MG: 0.5 TABLET ORAL at 08:04

## 2025-01-01 RX ADMIN — HYDRALAZINE HYDROCHLORIDE 10 MG: 20 INJECTION INTRAMUSCULAR; INTRAVENOUS at 10:04

## 2025-01-01 RX ADMIN — HYDROMORPHONE HYDROCHLORIDE 1 MG: 2 INJECTION INTRAMUSCULAR; INTRAVENOUS; SUBCUTANEOUS at 10:04

## 2025-01-01 RX ADMIN — MORPHINE SULFATE 4 MG: 4 INJECTION INTRAVENOUS at 10:04

## 2025-01-01 RX ADMIN — ENOXAPARIN SODIUM 40 MG: 40 INJECTION SUBCUTANEOUS at 05:04

## 2025-01-01 RX ADMIN — OXYCODONE HYDROCHLORIDE 15 MG: 5 TABLET ORAL at 07:04

## 2025-01-01 RX ADMIN — HYDROMORPHONE HYDROCHLORIDE 2 MG: 2 TABLET ORAL at 09:04

## 2025-01-01 RX ADMIN — CARVEDILOL 6.25 MG: 3.12 TABLET, FILM COATED ORAL at 10:04

## 2025-01-01 RX ADMIN — HYDROMORPHONE HYDROCHLORIDE 2 MG: 2 TABLET ORAL at 08:04

## 2025-01-01 RX ADMIN — Medication 400 ML: at 06:04

## 2025-01-01 RX ADMIN — HEPARIN SODIUM 22 UNITS/KG/HR: 10000 INJECTION, SOLUTION INTRAVENOUS at 10:04

## 2025-01-01 RX ADMIN — HYDROMORPHONE HYDROCHLORIDE 2 MG: 2 INJECTION INTRAMUSCULAR; INTRAVENOUS; SUBCUTANEOUS at 10:04

## 2025-01-01 RX ADMIN — GABAPENTIN 300 MG: 300 CAPSULE ORAL at 08:04

## 2025-01-01 RX ADMIN — METHOCARBAMOL 500 MG: 500 TABLET ORAL at 12:04

## 2025-01-01 RX ADMIN — GABAPENTIN 300 MG: 300 CAPSULE ORAL at 02:04

## 2025-01-01 RX ADMIN — HEPARIN SODIUM 18 UNITS/KG/HR: 10000 INJECTION, SOLUTION INTRAVENOUS at 10:04

## 2025-01-01 RX ADMIN — HYDROMORPHONE HYDROCHLORIDE 2 MG: 2 INJECTION INTRAMUSCULAR; INTRAVENOUS; SUBCUTANEOUS at 01:04

## 2025-01-01 RX ADMIN — HEPARIN SODIUM 20 UNITS/KG/HR: 10000 INJECTION, SOLUTION INTRAVENOUS at 07:04

## 2025-01-01 RX ADMIN — EPHEDRINE SULFATE 25 MG: 50 INJECTION INTRAVENOUS at 08:04

## 2025-01-01 RX ADMIN — SUGAMMADEX 100 MG: 100 INJECTION, SOLUTION INTRAVENOUS at 10:04

## 2025-01-01 RX ADMIN — METHOCARBAMOL 1000 MG: 100 INJECTION, SOLUTION INTRAMUSCULAR; INTRAVENOUS at 12:04

## 2025-01-01 RX ADMIN — AMITRIPTYLINE HYDROCHLORIDE 25 MG: 25 TABLET, FILM COATED ORAL at 09:04

## 2025-01-01 RX ADMIN — METHOCARBAMOL 500 MG: 500 TABLET ORAL at 02:04

## 2025-01-01 RX ADMIN — OXYCODONE HYDROCHLORIDE 30 MG: 10 TABLET ORAL at 05:04

## 2025-01-01 RX ADMIN — ONDANSETRON 4 MG: 2 INJECTION INTRAMUSCULAR; INTRAVENOUS at 10:04

## 2025-01-01 RX ADMIN — LABETALOL HYDROCHLORIDE 10 MG: 5 INJECTION, SOLUTION INTRAVENOUS at 04:04

## 2025-01-01 RX ADMIN — GLYCOPYRROLATE 0.2 MG: 0.2 INJECTION INTRAMUSCULAR; INTRAVENOUS at 08:04

## 2025-01-01 RX ADMIN — SODIUM CHLORIDE: 9 INJECTION, SOLUTION INTRAVENOUS at 12:04

## 2025-01-01 RX ADMIN — EPHEDRINE SULFATE 25 MG: 50 INJECTION INTRAVENOUS at 07:04

## 2025-01-01 RX ADMIN — METHOCARBAMOL 500 MG: 500 TABLET ORAL at 10:04

## 2025-01-01 RX ADMIN — SODIUM CHLORIDE: 9 INJECTION, SOLUTION INTRAVENOUS at 07:04

## 2025-01-01 RX ADMIN — HYDROMORPHONE HYDROCHLORIDE 2 MG: 2 INJECTION INTRAMUSCULAR; INTRAVENOUS; SUBCUTANEOUS at 12:04

## 2025-01-01 RX ADMIN — OXYCODONE HYDROCHLORIDE 15 MG: 5 TABLET ORAL at 04:04

## 2025-01-01 RX ADMIN — ONDANSETRON 4 MG: 2 INJECTION INTRAMUSCULAR; INTRAVENOUS at 06:04

## 2025-01-01 RX ADMIN — HYDROCODONE BITARTRATE AND ACETAMINOPHEN 1 TABLET: 10; 325 TABLET ORAL at 05:04

## 2025-01-01 RX ADMIN — OXYCODONE HYDROCHLORIDE 15 MG: 5 TABLET ORAL at 03:04

## 2025-01-01 RX ADMIN — ROCURONIUM BROMIDE 50 MG: 10 SOLUTION INTRAVENOUS at 07:04

## 2025-01-01 RX ADMIN — SODIUM CHLORIDE 1000 ML: 9 INJECTION, SOLUTION INTRAVENOUS at 10:04

## 2025-01-01 RX ADMIN — GABAPENTIN 300 MG: 300 CAPSULE ORAL at 10:04

## 2025-01-01 RX ADMIN — IOHEXOL 100 ML: 350 INJECTION, SOLUTION INTRAVENOUS at 10:04

## 2025-01-01 RX ADMIN — NIFEDIPINE 30 MG: 30 TABLET, FILM COATED, EXTENDED RELEASE ORAL at 09:04

## 2025-01-01 RX ADMIN — EPINEPHRINE 1 MG: 0.1 INJECTION INTRAVENOUS at 02:04

## 2025-01-01 RX ADMIN — CALCIUM CHLORIDE INJECTION 1 G: 100 INJECTION, SOLUTION INTRAVENOUS at 01:04

## 2025-01-01 RX ADMIN — HYDROMORPHONE HYDROCHLORIDE 0.2 MG: 2 INJECTION, SOLUTION INTRAMUSCULAR; INTRAVENOUS; SUBCUTANEOUS at 01:04

## 2025-01-01 RX ADMIN — LIDOCAINE HYDROCHLORIDE 80 MG: 20 INJECTION INTRAVENOUS at 07:04

## 2025-01-01 RX ADMIN — GABAPENTIN 300 MG: 300 CAPSULE ORAL at 03:04

## 2025-01-01 RX ADMIN — SUGAMMADEX 100 MG: 100 INJECTION, SOLUTION INTRAVENOUS at 11:04

## 2025-01-01 RX ADMIN — VASOPRESSIN 1 UNITS: 20 INJECTION INTRAVENOUS at 10:04

## 2025-01-01 RX ADMIN — HYDROCODONE BITARTRATE AND ACETAMINOPHEN 1 TABLET: 10; 325 TABLET ORAL at 07:04

## 2025-01-01 RX ADMIN — HYDROCODONE BITARTRATE AND ACETAMINOPHEN 1 TABLET: 5; 325 TABLET ORAL at 08:04

## 2025-01-01 RX ADMIN — HYDROMORPHONE HYDROCHLORIDE 0.4 MG: 2 INJECTION, SOLUTION INTRAMUSCULAR; INTRAVENOUS; SUBCUTANEOUS at 11:04

## 2025-01-01 RX ADMIN — OXYCODONE HYDROCHLORIDE 30 MG: 10 TABLET ORAL at 03:04

## 2025-01-01 RX ADMIN — OXYCODONE HYDROCHLORIDE 30 MG: 10 TABLET ORAL at 08:04

## 2025-01-01 RX ADMIN — HYDROMORPHONE HYDROCHLORIDE 1 MG: 2 INJECTION INTRAMUSCULAR; INTRAVENOUS; SUBCUTANEOUS at 12:04

## 2025-01-01 RX ADMIN — DEXAMETHASONE SODIUM PHOSPHATE 4 MG: 4 INJECTION, SOLUTION INTRA-ARTICULAR; INTRALESIONAL; INTRAMUSCULAR; INTRAVENOUS; SOFT TISSUE at 08:04

## 2025-01-01 RX ADMIN — NIFEDIPINE 30 MG: 30 TABLET, FILM COATED, EXTENDED RELEASE ORAL at 10:04

## 2025-01-01 RX ADMIN — SACUBITRIL AND VALSARTAN 1 TABLET: 24; 26 TABLET, FILM COATED ORAL at 10:04

## 2025-01-01 RX ADMIN — HYDRALAZINE HYDROCHLORIDE 10 MG: 20 INJECTION INTRAMUSCULAR; INTRAVENOUS at 12:04

## 2025-01-01 RX ADMIN — LABETALOL HYDROCHLORIDE 10 MG: 5 INJECTION, SOLUTION INTRAVENOUS at 01:04

## 2025-01-01 RX ADMIN — HYDROMORPHONE HYDROCHLORIDE 2 MG: 2 INJECTION INTRAMUSCULAR; INTRAVENOUS; SUBCUTANEOUS at 07:04

## 2025-01-01 RX ADMIN — ENOXAPARIN SODIUM 40 MG: 40 INJECTION SUBCUTANEOUS at 04:04

## 2025-01-01 RX ADMIN — ATORVASTATIN CALCIUM 80 MG: 40 TABLET, FILM COATED ORAL at 10:04

## 2025-01-01 RX ADMIN — Medication 400 ML: at 03:04

## 2025-01-01 RX ADMIN — PROPOFOL 30 MG: 10 INJECTION, EMULSION INTRAVENOUS at 07:04

## 2025-01-01 RX ADMIN — Medication 400 ML: at 04:04

## 2025-01-01 RX ADMIN — OXYCODONE HYDROCHLORIDE 30 MG: 10 TABLET ORAL at 09:04

## 2025-01-01 RX ADMIN — HEPARIN SODIUM 18 UNITS/KG/HR: 10000 INJECTION, SOLUTION INTRAVENOUS at 03:04

## 2025-01-01 RX ADMIN — HYDROMORPHONE HYDROCHLORIDE 2 MG: 2 INJECTION INTRAMUSCULAR; INTRAVENOUS; SUBCUTANEOUS at 09:04

## 2025-01-01 RX ADMIN — MORPHINE SULFATE 2 MG: 4 INJECTION INTRAVENOUS at 04:04

## 2025-01-01 RX ADMIN — FENTANYL CITRATE 50 MCG: 50 INJECTION, SOLUTION INTRAMUSCULAR; INTRAVENOUS at 07:04

## 2025-04-06 NOTE — ED PROVIDER NOTES
Encounter Date: 4/6/2025       History     Chief Complaint   Patient presents with    Leg Pain     L Lower leg pain    ext cold   pulse very hard to assess     The patient presents via EMS with left leg pain.  Yesterday he had some pain that subsided and he slept well.  However, he was awakened at 3:00 a.m. this morning with left leg pain that has steadily progressed throughout the day.  He has decreased sensation in the left lower extremity.  The patient had spinal cord surgery in 2019 and has been bed-bound since that time.  He lives with his daughter who provides care for him.  He takes no medications.  He thinks he may have had a stent put in one of his legs 5 years ago but he is uncertain about his history.  He takes no medications.  He smokes 1 pack of cigarettes per day.        Review of patient's allergies indicates:  No Known Allergies  Past Medical History:   Diagnosis Date    Contusion of cervical cord     Hepatitis     Hypertension     Mental disorder      Past Surgical History:   Procedure Laterality Date    FRACTURE SURGERY      SPINE SURGERY  09/04/2019    ACDF @ C3/4, 09/04/19, [facility], DR. SIN.    SPINE SURGERY  10/18/2019    C3-6 DCL, [facility], DR. SIN.     Family History   Problem Relation Name Age of Onset    Heart disease Mother      Cancer Father       Social History[1]  Review of Systems   All other systems reviewed and are negative.      Physical Exam     Initial Vitals [04/06/25 1832]   BP Pulse Resp Temp SpO2   (!) 213/130 83 20 97.8 °F (36.6 °C) 97 %      MAP       --         Physical Exam    Constitutional:   Vital signs reviewed.  Nursing note reviewed.    General:  The patient is awake and alert, appropriate and interactive.  Frail, chronically ill-appearing.  Eyes: Conjunctiva are clear.  Corneas appear normal.  EOMI.  ENT: Face, head, external nose, and ears are normal-appearing.  The oropharynx appears normal.  The uvula is midline.  The posterior pharyngeal elements are symmetric.  Voice is  normal.  Mucous membranes moist.  Neck: The neck is nontender and supple with normal range of motion.  Back: The back appears normal with full range of motion.  Respiratory: The respiratory effort is normal.  The lungs are clear to auscultation.  Cardiovascular: Heart sounds are normal.  No murmur or rub.  The rate is normal.  The rhythm is normal.  Right lower extremity: The right lower extremity is cool and has decreased pulses but he is having no pain in the right leg.  Left lower extremity:  I am unable to palpate pulses nor can the nurse obtained with Doppler.  The leg is cold from the mid shin distally.  There is a purplish hue.  Capillary refill is about 6 seconds.  The skin is intact.  GI: The abdomen is soft, nondistended, without mass.  There is no tenderness to palpation.  No rebound or guarding.  Bowel sounds are normal.  The liver and spleen are nonpalpable.  Musculoskeletal: Range of motion of all joints appears normal without pain or tenderness.  Skin: There is no rash.  Neurologic: No focal deficits are noted.           ED Course   Procedures  Labs Reviewed   COMPREHENSIVE METABOLIC PANEL - Abnormal       Result Value    Sodium 135 (*)     Potassium 3.5      Chloride 102      CO2 21 (*)     Glucose 122 (*)     Blood Urea Nitrogen 21.0      Creatinine 1.82 (*)     Calcium 8.7 (*)     Protein Total 6.7      Albumin 3.0 (*)     Globulin 3.7 (*)     Albumin/Globulin Ratio 0.8 (*)     Bilirubin Total 0.4      ALP 48      ALT 8      AST 12      eGFR 40      Anion Gap 12.0      BUN/Creatinine Ratio 12     TROPONIN I - Abnormal    Troponin-I 0.526 (*)    CBC WITH DIFFERENTIAL - Abnormal    WBC 12.60 (*)     RBC 5.35      Hgb 14.5      Hct 43.0      MCV 80.4      MCH 27.1      MCHC 33.7      RDW 15.9      Platelet 227      MPV 10.2      Neut % 68.6      Lymph % 20.1      Mono % 8.8      Eos % 1.5      Basophil % 0.4      Imm Grans % 0.6      Neut # 8.65      Lymph # 2.53      Mono # 1.11      Eos # 0.19       Baso # 0.05      Imm Gran # 0.07 (*)     NRBC% 0.0     PROTIME-INR - Normal    PT 14.2      INR 1.0      Narrative:     Protimes are used to monitor anticoagulant agents such as warfarin. PT INR values are based on the current patient normal mean and the JULIO value for the specific instrument reagent used.  **Routine theraputic target values for the INR are 2.0-3.0**   APTT - Normal    PTT 30.3     CK - Normal    Creatine Kinase 31     CBC W/ AUTO DIFFERENTIAL    Narrative:     The following orders were created for panel order CBC auto differential.  Procedure                               Abnormality         Status                     ---------                               -----------         ------                     CBC with Differential[[id number]]       Abnormal            Final result                 Please view results for these tests on the individual orders.     EKG Readings: (Independently Interpreted)   Normal sinus rhythm with a ventricular rate of 87 beats per minute.  First-degree AV block is present.  QT is prolonged at 515 milliseconds.  Bifascicular block is present.  LVH with repolarization abnormalities are present.  Compared with August 19, 2024, the LVH with repolarization abnormality is new, the bifascicular block is old.       Imaging Results              US Lower Extremity Arteries Left (Final result)  Result time 04/06/25 19:41:06      Final result by Shamir Russo MD (04/06/25 19:41:06)                   Impression:      No flow is demonstrated in the arteries of the left lower extremity      Electronically signed by: Shamir Russo MD  Date:    04/06/2025  Time:    19:41               Narrative:    EXAMINATION:  US LOWER EXTREMITY ARTERIES LEFT    CLINICAL HISTORY:  Pain in left leg    COMPARISON:  None    FINDINGS:  No flow is seen in the common femoral artery the superficial femoral artery, the popliteal artery the peroneal, anterior and posterior tibial artery.                                        Medications   hydrALAZINE injection 10 mg (0 mg Intravenous Hold 4/6/25 1930)   heparin 25,000 units in dextrose 5% 250 mL (100 units/mL) infusion HIGH INTENSITY nomogram - [facility] (18 Units/kg/hr × 74.8 kg Intravenous New Bag 4/6/25 1948)   heparin 25,000 units in dextrose 5% (100 units/ml) IV bolus from bag HIGH INTENSITY nomogram - [facility] (has no administration in time range)   heparin 25,000 units in dextrose 5% (100 units/ml) IV bolus from bag HIGH INTENSITY nomogram - [facility] (has no administration in time range)   lactated ringers bolus 1,000 mL (0 mLs Intravenous Stopped 4/6/25 1954)   morphine injection 4 mg (4 mg Intravenous Given 4/6/25 1855)   ondansetron injection 4 mg (4 mg Intravenous Given 4/6/25 1855)   heparin 25,000 units in dextrose 5% (100 units/ml) IV bolus from bag HIGH INTENSITY nomogram - [facility] (5,984 Units Intravenous Bolus from Bag 4/6/25 1949)     Medical Decision Making  I rechecked the patient twice during his ED stay, most recently at 2000.  He is resting comfortably feels much better after morphine.  His pain is minimal.  His blood pressure was quite elevated when he came in but has come down to an acceptable range only with morphine and without a need for antihypertensive therapy.  I am okay with permissive hypertension (in the range of 180/100) because I suspect he has longstanding hypertension I do not want to cause hypoperfusion of the leg by lowering his blood pressure too much.  The troponin is mildly elevated but I suspect this is demand ischemia and does not represent a primary acute coronary syndrome.  He has significant arterial blockage of the left lower extremity.  Heparin bolus and drip has been initiated.  He will need transfer for higher level of care.  The patient understands and is in agreement with this plan of care.    In order to obtain a more complete history, additional information was obtained from:  None.    I reviewed the following prior external notes:   None.    Discussion with other physicians/healthcare providers:  I entered the patient into the transfer portal and he was accepted by Dr. Norman in the ER at [facility] at 2010 with no doctor to doctor discussion.    Decision to admit/transfer/discharge:  After my evaluation of the patient and interpretation of all relevant information, the decision has been made to transfer the patient.    After discussion with the patient/decision maker(s), the patient is full code.    I independently reviewed and interpreted any laboratory tests, radiographic images, EKGs, rhythm strips, and other diagnostic tests that were obtained during this Emergency Department visit.    Drug/medication management:  Parenteral controlled substances and other dangerous medications, if administered, can be found in the order section of this chart.  I reviewed the patient's home medications and made changes/adjustments as medically indicated.  Any new medications are documented under the prescription section of this chart.    Social determinants of health addressed during this ED visit:  The patient/caregiver knowledge deficit about today's condition and treatment plan was addressed by me through appropriate patient/caregiver education.      Procedures/plan for surgery:  Intervention for the left leg arterial occlusion.    Critical care:  Time spent: 40 minutes.  The high probability of significant deterioration in the patient's condition required the highest level of my care and the need to intervene urgently.  I provided critical care services requiring my direct management to review nursing notes, previous records, orders, repeat evaluations, continuous assessment, ongoing care, and transfer to the admitting physician.  This time excludes time for separately billed procedures.      Amount and/or Complexity of Data Reviewed  Labs: ordered.    Risk  Prescription drug management.                                      Clinical  Impression:  Final diagnoses:  [M79.605] Leg pain, left  [I70.90] Arterial occlusion (Primary)          ED Disposition Condition    Transfer to Another Facility Serious                    [1]   Social History  Tobacco Use    Smoking status: Every Day     Current packs/day: 0.50     Types: Cigarettes    Smokeless tobacco: Never   Substance Use Topics    Alcohol use: Never    Drug use: Yes        Dillon Plasencia MD  04/07/25 0021

## 2025-04-07 ENCOUNTER — ANESTHESIA EVENT (OUTPATIENT)
Dept: SURGERY | Facility: HOSPITAL | Age: 70
End: 2025-04-07
Payer: MEDICARE

## 2025-04-07 ENCOUNTER — ANESTHESIA (OUTPATIENT)
Dept: SURGERY | Facility: HOSPITAL | Age: 70
End: 2025-04-07
Payer: MEDICARE

## 2025-04-07 PROBLEM — F17.200 TOBACCO DEPENDENCY: Status: ACTIVE | Noted: 2025-01-01

## 2025-04-07 PROBLEM — D69.6 THROMBOCYTOPENIA: Status: ACTIVE | Noted: 2025-01-01

## 2025-04-07 PROBLEM — E87.1 HYPONATREMIA: Status: ACTIVE | Noted: 2025-01-01

## 2025-04-07 PROBLEM — I74.3: Status: ACTIVE | Noted: 2025-01-01

## 2025-04-07 PROCEDURE — 63600175 PHARM REV CODE 636 W HCPCS: Performed by: NURSE ANESTHETIST, CERTIFIED REGISTERED

## 2025-04-07 PROCEDURE — 25000003 PHARM REV CODE 250: Performed by: NURSE ANESTHETIST, CERTIFIED REGISTERED

## 2025-04-07 PROCEDURE — D9220A PRA ANESTHESIA: Mod: ANES,,, | Performed by: ANESTHESIOLOGY

## 2025-04-07 PROCEDURE — 63600175 PHARM REV CODE 636 W HCPCS: Performed by: SURGERY

## 2025-04-07 PROCEDURE — P9047 ALBUMIN (HUMAN), 25%, 50ML: HCPCS | Performed by: NURSE ANESTHETIST, CERTIFIED REGISTERED

## 2025-04-07 PROCEDURE — D9220A PRA ANESTHESIA: Mod: CRNA,,, | Performed by: NURSE ANESTHETIST, CERTIFIED REGISTERED

## 2025-04-07 RX ORDER — PROPOFOL 10 MG/ML
VIAL (ML) INTRAVENOUS
Status: DISCONTINUED | OUTPATIENT
Start: 2025-04-07 | End: 2025-04-08

## 2025-04-07 RX ORDER — PHENYLEPHRINE HYDROCHLORIDE 10 MG/ML
INJECTION INTRAVENOUS
Status: DISCONTINUED | OUTPATIENT
Start: 2025-04-07 | End: 2025-04-08

## 2025-04-07 RX ORDER — ROCURONIUM BROMIDE 10 MG/ML
INJECTION, SOLUTION INTRAVENOUS
Status: DISCONTINUED | OUTPATIENT
Start: 2025-04-07 | End: 2025-04-08

## 2025-04-07 RX ORDER — FENTANYL CITRATE 50 UG/ML
INJECTION, SOLUTION INTRAMUSCULAR; INTRAVENOUS
Status: DISCONTINUED | OUTPATIENT
Start: 2025-04-07 | End: 2025-04-08

## 2025-04-07 RX ORDER — LIDOCAINE HYDROCHLORIDE 20 MG/ML
INJECTION INTRAVENOUS
Status: DISCONTINUED | OUTPATIENT
Start: 2025-04-07 | End: 2025-04-08

## 2025-04-07 RX ORDER — ALBUMIN HUMAN 250 G/1000ML
SOLUTION INTRAVENOUS
Status: DISCONTINUED | OUTPATIENT
Start: 2025-04-07 | End: 2025-04-08

## 2025-04-07 RX ADMIN — VASOPRESSIN 1 UNITS: 20 INJECTION INTRAVENOUS at 11:04

## 2025-04-07 RX ADMIN — SODIUM CHLORIDE, SODIUM GLUCONATE, SODIUM ACETATE, POTASSIUM CHLORIDE AND MAGNESIUM CHLORIDE: 526; 502; 368; 37; 30 INJECTION, SOLUTION INTRAVENOUS at 10:04

## 2025-04-07 RX ADMIN — PROPOFOL 80 MG: 10 INJECTION, EMULSION INTRAVENOUS at 11:04

## 2025-04-07 RX ADMIN — FENTANYL CITRATE 100 MCG: 50 INJECTION, SOLUTION INTRAMUSCULAR; INTRAVENOUS at 11:04

## 2025-04-07 RX ADMIN — SUCCINYLCHOLINE CHLORIDE 120 MG: 20 INJECTION, SOLUTION INTRAMUSCULAR; INTRAVENOUS at 11:04

## 2025-04-07 RX ADMIN — ALBUMIN (HUMAN) 100 ML: 12.5 SOLUTION INTRAVENOUS at 11:04

## 2025-04-07 RX ADMIN — LIDOCAINE HYDROCHLORIDE 40 MG: 20 INJECTION INTRAVENOUS at 11:04

## 2025-04-07 RX ADMIN — ROCURONIUM BROMIDE 10 MG: 10 SOLUTION INTRAVENOUS at 11:04

## 2025-04-07 RX ADMIN — PHENYLEPHRINE HYDROCHLORIDE 100 MCG: 10 INJECTION INTRAVENOUS at 11:04

## 2025-04-07 RX ADMIN — CEFAZOLIN 2 G: 2 INJECTION, POWDER, FOR SOLUTION INTRAMUSCULAR; INTRAVENOUS at 11:04

## 2025-04-07 NOTE — CONSULTS
Inpatient consult to Cardiology  Consult performed by: Yogi Schmitz AGACNP-BC  Consult ordered by: Reyes, Thairy G, DO  Reason for consult: wide complex tachycardia, NSTEMI, vasculopathy          [facility]  Cardiology  Consult Note    Patient Name: Prasanna Whiting  MRN: [id number]  Admission Date: 4/6/2025  Hospital Length of Stay: 1 days  Code Status: Full Code   Attending Provider: Yifan Cottrell MD   Consulting Provider: MO RihcmondBC  Primary Care Physician: Mary, Primary Doctor  Principal Problem:Arterial embolus of lower extremity    Patient information was obtained from patient, past medical records, and ER records.     Subjective:     Chief Complaint:  Consulted for wide complex tachycardia, NSTEMI, and vasculopathy     HPI:   This is a 69-year-old male, who has distantly known to Dr. Reid who was last seen in 2016, history of CAD, HTN, HLD, PVD, GERD, tobacco use, medical noncompliance.  He said he has not seen a physician in over 4 years and has not taken any medicines since then.  He presented to [facility] ER with complaints of left lower extremity burning started the day prior to arrival.  Patient was found to have occlusion of the entire left leg.  CTA revealed thrombus in the distal thoracic aorta as well as occlusions bilateral iliac arteries.  BP was markedly elevated.  Initial troponin was 0.526.  Overnight he had wide complex tachycardia noted on monitor.  EKG reveals sinus rhythm with PVCs and bifascicular block.  He denied chest pain.  CIS has been consulted for a while, this tachycardia, NSTEMI, and vasculopathy.  Of note vascular surgery has been consulted for left lower leg arterial occlusion.      PMH: CAD, HTN, HLD, PVD, GERD, tobacco use, medical noncompliance  PSH: neck surgery, hip surgery, peripheral angiogram   Social History: current tobacco use, Denies EtOH and illicit drug use  Family History: Father-CAD, Mother-CAD/CABG    Previous Cardiac Diagnostics:    CTA with runoff 4.6.25  There is no significant interval change in the 2.4 cm low attenuation nodule at the medial limb of the right adrenal gland on image 21 series 4. Correlate with clinical and laboratory findings as regards further evaluation and follow-up.   There is interval progession in the focal areas of decreased enhancement with associated mild perinephric fat stranding in the lower pole of the left kidney. This may represent a non expansile neoplastic process, pyelonephritis is not entirely excluded. Correlate with clinical and laboratory findings as regards further evaluation and follow-up.   There is peripherally located thrombus in the distal thoracic aorta through to the suprailiac aorta with associated mild to moderate luminal narrowing. There is total occlusion of the superior mesenteric artery on image 146 series 16, inferior mesenteric artery on image 257 as well as right common iliac artery. There is no enhancement noted in the right internal and external iliac arteries.   There is total occlusion of the left internal iliac artery at its origin without enhancement in the distal segment.   There is total occlusion of the left common femoral artery on image 203 series 15 without enhancement in the left superficial femoral, popliteal and trifurcation vessels. The delayed phase images show no flow in the left popliteal artery with possibly some minimal patchy reconstituted flow in scattered short segments of the trifurcation vessels with posterior tibial artery flow at the ankle. Correlate clinically as regards additional evaluation and follow-up possibly with digital subtraction angiography if clinically indicated.   The early phase imaging does not demonstrate flow in the right lower extremity arteries. The delayed phase images (series 37) demonstrates some reconstituted flow in the right distal superficial femoral artery and popliteal artery with pronounced calcification of the trifurcation  vessels but with intermittent identification of contrast in the proximal trifurcation vessels with some contrast in the peroneal artery at the ankle with some reconstituted anterior and posterior tibial flow at the ankle as well. Consider follow-up with digital subtraction angiography if clinically indicated.   Details and other findings as discussed above.     LLE arterial US 4.6.25  No flow is demonstrated in the arteries of the left lower extremity       Review of patient's allergies indicates:  No Known Allergies    Current Facility-Administered Medications on File Prior to Encounter   Medication    [COMPLETED] heparin 25,000 units in dextrose 5% (100 units/ml) IV bolus from bag HIGH INTENSITY nomogram - LAF    [COMPLETED] lactated ringers bolus 1,000 mL    [COMPLETED] morphine injection 4 mg    [COMPLETED] ondansetron injection 4 mg    [DISCONTINUED] heparin 25,000 units in dextrose 5% (100 units/ml) IV bolus from bag HIGH INTENSITY nomogram - LAF    [DISCONTINUED] heparin 25,000 units in dextrose 5% (100 units/ml) IV bolus from bag HIGH INTENSITY nomogram - LAF    [DISCONTINUED] heparin 25,000 units in dextrose 5% 250 mL (100 units/mL) infusion HIGH INTENSITY nomogram - LAF    [DISCONTINUED] hydrALAZINE injection 10 mg     Current Outpatient Medications on File Prior to Encounter   Medication Sig    albuterol (PROVENTIL/VENTOLIN HFA) 90 mcg/actuation inhaler     ATROVENT HFA 17 mcg/actuation inhaler     baclofen (LIORESAL) 20 MG tablet     DULoxetine (CYMBALTA) 30 MG capsule     enalapril (VASOTEC) 20 MG tablet     finasteride (PROSCAR) 5 mg tablet     ibuprofen (ADVIL,MOTRIN) 100 mg/5 mL suspension     omeprazole (PRILOSEC) 40 MG capsule     predniSONE (DELTASONE) 20 MG tablet     rosuvastatin (CRESTOR) 20 MG tablet     VITAMIN D2 50,000 unit capsule        Review of Systems:  Review of Systems   Constitutional: Negative.    HENT: Negative.     Eyes: Negative.    Respiratory: Negative.     Cardiovascular:          BLE pain and burning. BLE cold   Gastrointestinal: Negative.    Endocrine: Negative.    Genitourinary: Negative.    Musculoskeletal: Negative.    Skin: Negative.    Allergic/Immunologic: Negative.    Neurological: Negative.    Hematological: Negative.    Psychiatric/Behavioral: Negative.         Objective:     Vital Signs (Most Recent):  Temp: 97.5 °F (36.4 °C) (04/06/25 2127)  Pulse: 88 (04/07/25 0902)  Resp: 19 (04/07/25 0914)  BP: (!) 140/107 (04/07/25 0902)  SpO2: 96 % (04/07/25 0828) Vital Signs (24h Range):  Temp:  [97.5 °F (36.4 °C)-97.8 °F (36.6 °C)] 97.5 °F (36.4 °C)  Pulse:  [76-97] 88  Resp:  [11-24] 19  SpO2:  [95 %-99 %] 96 %  BP: (139-219)/() 140/107     Weight: 72.6 kg (160 lb)  Body mass index is 21.11 kg/m².    SpO2: 96 %       No intake or output data in the 24 hours ending 04/07/25 0930    Lines/Drains/Airways       Peripheral Intravenous Line  Duration                  Peripheral IV - Single Lumen 04/06/25 18 G Left;Posterior Forearm 1 day         Peripheral IV - Single Lumen 04/06/25 20 G Anterior;Right Forearm 1 day                      Significant Labs:   Chemistries:   Recent Labs   Lab 04/06/25 1849 04/07/25  0006   * 136   K 3.5 3.5    105   CO2 21* 20*   BUN 21.0 16.9   CREATININE 1.82* 1.31*   CALCIUM 8.7* 8.2*   BILITOT 0.4 0.4   ALKPHOS 48 40   ALT 8 5   AST 12 11   GLUCOSE 122* 107   TROPONINI 0.526*  --         CBC/Anemia Labs: Coags:    Recent Labs   Lab 04/06/25 1849 04/06/25 2158   WBC 12.60* 13.74*   HGB 14.5 14.2   HCT 43.0 41.8*    148   MCV 80.4 80.2   RDW 15.9 15.9    Recent Labs   Lab 04/06/25 1849 04/06/25 2158 04/07/25  0204 04/07/25  0814   INR 1.0 1.2  --   --    APTT 30.3 >200.0* 90.9* 69.5*            Significant Imaging:   Imaging Results              CTA Runoff ABD PEL Bilat Lower Ext (Preliminary result)  Result time 04/06/25 23:48:33      Preliminary result by Rodriog Chambers MD (04/06/25 23:48:33)                   Narrative:     START OF REPORT:  Technique: CT Angiogram abdomen and pelvis with and without contrast. Additionally a CTA Abdominal aorta with runoff was performed.    Comparison: Comparison is with study dated 2024-08-19 19:33:57.    Clinical History: Arterialembolism,lowerextremity,Claudicationorlegischemia,leftlegpain.    Dosage Information: Automated Exposure Control was utilized 1833.99 mGy.cm.    Findings:  Lines and Tubes: None.  Thorax:  Lungs: There is mild nonspecific dependent change at the lung bases. No focal infiltrate or consolidation is seen.  Pleura: No effusions or thickening.  Heart: The heart size is within normal limits. Moderate coronary artery calcification is seen.  Abdomen:  Abdominal Wall: No abdominal wall pathology is seen.  Liver: The liver appears unremarkable.  Biliary System: No intrahepatic or extrahepatic biliary duct dilatation is seen.  Gallbladder: The gallbladder appears unremarkable.  Pancreas: Mild pancreatic atrophy is seen.  Spleen: The spleen appears unremarkable.  Adrenals: There is no significant interval change in the 2.4 cm low attenuation nodule at the medial limb of the right adrenal gland on image 21 series 4.  Kidneys: There is focal cortical thinning in the right kidney consistent with scarring. There is interval progession in the focal areas of decreased enhancement with associated mild perinephric fat stranding in the lower pole of the left kidney.  Bowel:  Esophagus: The visualized esophagus appears unremarkable.  Stomach: The stomach appears unremarkable.  Duodenum: Unremarkable appearing duodenum.  Small Bowel: The small bowel appears unremarkable.  Colon: Nondistended.  Appendix: The appendix is not identified but no inflammatory changes are seen in the right lower quadrant to suggest appendicitis.  Peritoneum: No intraperitoneal free air or ascites is seen.    Pelvis:  Bladder: Th bladder appears unremarkable.  Male:  Prostate gland: There are a few calcifications in the  prostate gland.    Bony structures:  Dorsal Spine: There is moderate spondylosis of the visualized dorsal spine.  Bony Pelvis: There is mild degenerative change of the bilateral hips.  Hip: The visualized structures of the hip appear unremarkable.  Lower extremity bony structures: The bones of the left lower extremity appear intact. The bones of the right lower extremity appear intact.    Vascular Structures:  Aorta: There is moderate calcification of the abdominal aorta. There is peripherally located thrombus in the distal thoracic aorta through to the suprailiac aorta with associated mild to moderate luminal narrowing. There is total occlusion of the superior mesenteric artery on image 146 series 16, inferior mesenteric artery on image 257 as well as right common iliac artery. There is no enhancement noted in the right internal and external iliac arteries.  Iliac Arteries: Moderate atheromatous calcification are seen in the bilateral iliac arteries and branches.  Left Common Iliac Artery: Stent is seen in place.  Left internal iliac artery: There is total occlusion of the left internal iliac artery at its origin without enhancement in the distal segment.  Right Lower extremity arteries: The early phase imaging does not demonstrate flow in the right lower extremity arteries. The delayed phase images (series 37) demonstrates some reconstituted flow in the right distal superficial femoral artery and popliteal artery with pronounced calcification of the trifurcation vessels but with intermittent identification of contrast in the proximal trifurcation vessels with some contrast in the peroneal artery at the ankle with some reconstituted anterior and posterior tibial flow at the ankle as well.  Left Lower extremity arteries: There is total occlusion of the left common femoral artery on image 203 series 15 without enhancement in the left superficial femoral, popliteal and trifurcation vessels. The delayed phase images  show no flow in the left popliteal artery with possibly some minimal patchy reconstituted flow in scattered short segments of the trifurcation vessels with posterior tibial artery flow at the ankle.    Notifications: The results were discussed with the emergency room physician (Dr Craven) prior to dictation at 2025-04-06 23:43:04 CDT.      Impression:  1. There is no significant interval change in the 2.4 cm low attenuation nodule at the medial limb of the right adrenal gland on image 21 series 4. Correlate with clinical and laboratory findings as regards further evaluation and follow-up.  2. There is interval progession in the focal areas of decreased enhancement with associated mild perinephric fat stranding in the lower pole of the left kidney. This may represent a non expansile neoplastic process, pyelonephritis is not entirely excluded. Correlate with clinical and laboratory findings as regards further evaluation and follow-up.  3. There is peripherally located thrombus in the distal thoracic aorta through to the suprailiac aorta with associated mild to moderate luminal narrowing. There is total occlusion of the superior mesenteric artery on image 146 series 16, inferior mesenteric artery on image 257 as well as right common iliac artery. There is no enhancement noted in the right internal and external iliac arteries.  4. There is total occlusion of the left internal iliac artery at its origin without enhancement in the distal segment.  5. There is total occlusion of the left common femoral artery on image 203 series 15 without enhancement in the left superficial femoral, popliteal and trifurcation vessels. The delayed phase images show no flow in the left popliteal artery with possibly some minimal patchy reconstituted flow in scattered short segments of the trifurcation vessels with posterior tibial artery flow at the ankle. Correlate clinically as regards additional evaluation and follow-up possibly with  digital subtraction angiography if clinically indicated.  6. The early phase imaging does not demonstrate flow in the right lower extremity arteries. The delayed phase images (series 37) demonstrates some reconstituted flow in the right distal superficial femoral artery and popliteal artery with pronounced calcification of the trifurcation vessels but with intermittent identification of contrast in the proximal trifurcation vessels with some contrast in the peroneal artery at the ankle with some reconstituted anterior and posterior tibial flow at the ankle as well. Consider follow-up with digital subtraction angiography if clinically indicated.  7. Details and other findings as discussed above.                                          EKG:  No results found for this visit on 04/06/25.    Telemetry:  SR with PVCs    Physical Exam:  Physical Exam  Constitutional:       Appearance: Normal appearance.   HENT:      Head: Normocephalic.   Eyes:      Extraocular Movements: Extraocular movements intact.      Conjunctiva/sclera: Conjunctivae normal.   Cardiovascular:      Rate and Rhythm: Normal rate and regular rhythm.      Pulses: Normal pulses.      Heart sounds: Normal heart sounds.   Pulmonary:      Effort: Pulmonary effort is normal.      Breath sounds: Normal breath sounds.   Abdominal:      Palpations: Abdomen is soft.   Musculoskeletal:         General: Normal range of motion.      Cervical back: Neck supple.   Skin:     General: Skin is dry.      Comments: BLE cold to touch from mid thigh down to feet.    Neurological:      Mental Status: He is alert and oriented to person, place, and time. Mental status is at baseline.   Psychiatric:         Mood and Affect: Mood normal.         Behavior: Behavior normal.         Home Medications:   Medications Ordered Prior to Encounter[1]    Current Inpatient Medications:  Current Medications[2]           Assessment:     IMPRESSION:  NSTEMI possibly type II  -Troponin max 0.526,  now trending down  Hypertensive emergency  WCT  Distal thoracic aortic thrombus  L internal iliac and common femoral occlusion  Bilateral severe PAD  AROLDO on CKD  CAD  HTN  HLD  Adrenal adenoma  Tobacco use  Medical noncompliance      PLAN:     PLAN:  Continue heparin gtt for now  Vascular surgery consulted for LLE arterial occlusion  Continue to trend CE until peak  Pain control per primary  High intensity statin  Continue Nifedipine  Echo    Thank you for your consult.     Yogi Schmitz, [facility]  Cardiology  [facility]  04/07/2025 9:30 AM         [1]   Current Facility-Administered Medications on File Prior to Encounter   Medication Dose Route Frequency Provider Last Rate Last Admin    [COMPLETED] heparin 25,000 units in dextrose 5% (100 units/ml) IV bolus from bag HIGH INTENSITY nomogram - LAF  80 Units/kg Intravenous Once Dillon Plasencia MD   5,984 Units at 04/06/25 1949    [COMPLETED] lactated ringers bolus 1,000 mL  1,000 mL Intravenous ED 1 Time Dillon Plasencia MD   Stopped at 04/06/25 1954    [COMPLETED] morphine injection 4 mg  4 mg Intravenous ED 1 Time Dillon Plasencia MD   4 mg at 04/06/25 1855    [COMPLETED] ondansetron injection 4 mg  4 mg Intravenous ED 1 Time Dillon Plasencia MD   4 mg at 04/06/25 1855    [DISCONTINUED] heparin 25,000 units in dextrose 5% (100 units/ml) IV bolus from bag HIGH INTENSITY nomogram - LAF  60 Units/kg Intravenous PRN Dillon Plasencia MD        [DISCONTINUED] heparin 25,000 units in dextrose 5% (100 units/ml) IV bolus from bag HIGH INTENSITY nomogram - LAF  30 Units/kg Intravenous PRN Dillon Plasencia MD        [DISCONTINUED] heparin 25,000 units in dextrose 5% 250 mL (100 units/mL) infusion HIGH INTENSITY nomogram - LAF  0-40 Units/kg/hr Intravenous Continuous Dillon Plasencia MD 13.5 mL/hr at 04/06/25 1948 18 Units/kg/hr at 04/06/25 1948    [DISCONTINUED] hydrALAZINE injection 10 mg  10 mg Intravenous ED 1 Time  Dillon Plasencia MD         Current Outpatient Medications on File Prior to Encounter   Medication Sig Dispense Refill    albuterol (PROVENTIL/VENTOLIN HFA) 90 mcg/actuation inhaler       ATROVENT HFA 17 mcg/actuation inhaler       baclofen (LIORESAL) 20 MG tablet       DULoxetine (CYMBALTA) 30 MG capsule       enalapril (VASOTEC) 20 MG tablet       finasteride (PROSCAR) 5 mg tablet       ibuprofen (ADVIL,MOTRIN) 100 mg/5 mL suspension       omeprazole (PRILOSEC) 40 MG capsule       predniSONE (DELTASONE) 20 MG tablet       rosuvastatin (CRESTOR) 20 MG tablet       VITAMIN D2 50,000 unit capsule      [2]   Current Facility-Administered Medications:     acetaminophen tablet 650 mg, 650 mg, Oral, Q4H PRN, Reyes, Thairy G, DO    albuterol-ipratropium 2.5 mg-0.5 mg/3 mL nebulizer solution 3 mL, 3 mL, Nebulization, Q6H PRN, Reyes, Thairy G, DO    aluminum-magnesium hydroxide-simethicone 200-200-20 mg/5 mL suspension 30 mL, 30 mL, Oral, QID PRN, Reyes, Thairy G, DO    atorvastatin tablet 80 mg, 80 mg, Oral, Daily, Reyes, Thairy G, DO, 80 mg at 04/07/25 0900    bisacodyL suppository 10 mg, 10 mg, Rectal, Daily PRN, Reyes, Thairy G, DO    dextrose 50% injection 12.5 g, 12.5 g, Intravenous, PRN, Reyes, Thairy G, DO    dextrose 50% injection 25 g, 25 g, Intravenous, PRN, Reyes, Thairy G, DO    glucagon (human recombinant) injection 1 mg, 1 mg, Intramuscular, PRN, Reyes, Thairy G, DO    glucose chewable tablet 16 g, 16 g, Oral, PRN, Reyes, Thairy G, DO    glucose chewable tablet 24 g, 24 g, Oral, PRN, Reyes, Thairy G, DO    heparin 25,000 units in dextrose 5% (100 units/ml) IV bolus from bag HIGH INTENSITY nomogram - LAF, 80 Units/kg, Intravenous, Once, Reyes, Thairy G, DO    heparin 25,000 units in dextrose 5% (100 units/ml) IV bolus from bag HIGH INTENSITY nomogram - LAF, 60 Units/kg, Intravenous, PRN, Reyes, Thairy G, DO    heparin 25,000 units in dextrose 5% (100 units/ml) IV bolus from bag HIGH INTENSITY nomogram -  LAF, 30 Units/kg, Intravenous, PRN, Reyes, Thairy G, DO    heparin 25,000 units in dextrose 5% 250 mL (100 units/mL) infusion HIGH INTENSITY nomogram - LAF, 0-40 Units/kg/hr, Intravenous, Continuous, Reyes, Thairy G, , Last Rate: 13.1 mL/hr at 04/06/25 2212, 18 Units/kg/hr at 04/06/25 2212    hydrALAZINE injection 10 mg, 10 mg, Intravenous, Q4H PRN, Yifan Cottrell MD, 10 mg at 04/07/25 0652    HYDROcodone-acetaminophen 5-325 mg per tablet 1 tablet, 1 tablet, Oral, Q6H PRN, Reyes, Thairy G,     HYDROmorphone (PF) injection 1 mg, 1 mg, Intravenous, Q6H PRN, Yifan Cottrell MD, 1 mg at 04/07/25 0914    labetaloL injection 10 mg, 10 mg, Intravenous, QID PRN, Reyes, Thairy G, , 10 mg at 04/07/25 0432    melatonin tablet 9 mg, 9 mg, Oral, Nightly PRN, Reyes, Thairy G, DO    naloxone 0.4 mg/mL injection 0.02 mg, 0.02 mg, Intravenous, PRN, Reyes, Thairy G, DO    nicotine 14 mg/24 hr 1 patch, 1 patch, Transdermal, Daily PRN, Reyes, Thairy G, DO    NIFEdipine 24 hr tablet 30 mg, 30 mg, Oral, Daily, Reyes, Thairy G, , 30 mg at 04/07/25 0900    ondansetron disintegrating tablet 8 mg, 8 mg, Oral, Q8H PRN, Reyes, Thairy G, DO    ondansetron injection 4 mg, 4 mg, Intravenous, Q8H PRN, Reyes, Thairy G, DO    polyethylene glycol packet 17 g, 17 g, Oral, TID PRN, Reyes, Thairy G, DO    simethicone chewable tablet 80 mg, 1 tablet, Oral, QID PRN, Reyes, Thairy G, DO    sodium chloride 0.9% flush 10 mL, 10 mL, Intravenous, PRN, Reyes, Thairy G, DO    Current Outpatient Medications:     albuterol (PROVENTIL/VENTOLIN HFA) 90 mcg/actuation inhaler, , Disp: , Rfl:     ATROVENT HFA 17 mcg/actuation inhaler, , Disp: , Rfl:     baclofen (LIORESAL) 20 MG tablet, , Disp: , Rfl:     DULoxetine (CYMBALTA) 30 MG capsule, , Disp: , Rfl:     enalapril (VASOTEC) 20 MG tablet, , Disp: , Rfl:     finasteride (PROSCAR) 5 mg tablet, , Disp: , Rfl:     ibuprofen (ADVIL,MOTRIN) 100 mg/5 mL suspension, , Disp: , Rfl:     omeprazole (PRILOSEC) 40 MG  capsule, , Disp: , Rfl:     predniSONE (DELTASONE) 20 MG tablet, , Disp: , Rfl:     rosuvastatin (CRESTOR) 20 MG tablet, , Disp: , Rfl:     VITAMIN D2 50,000 unit capsule, , Disp: , Rfl:

## 2025-04-07 NOTE — ANESTHESIA PREPROCEDURE EVALUATION
04/07/2025    Prasanna Whiting is a 69 y.o., male hx of hepatitis and HTN who presents to the ED as a transfer from [place] for vascular surgery. Pt states beginning yesterday he began having pain to his left lower extremity and noticed it was cold to the touch so he went to [place] ED. Ultrasound of the left lower extremity done at [place] showing arterial occlusion.     Pre-operative evaluation for Procedure(s) (LRB):  EMBOLECTOMY, LOWER EXTREMITY (Left)    Pre-op Assessment    I have reviewed the Patient Summary Reports.     I have reviewed the Nursing Notes. I have reviewed the NPO Status.   I have reviewed the Medications.     Review of Systems  Anesthesia Hx:  No problems with previous Anesthesia                Cardiovascular:  Exercise tolerance: poor                                                 Past Medical History:   Diagnosis Date    Contusion of cervical cord     Hepatitis     Hypertension     Mental disorder        Problem List[1]    Review of patient's allergies indicates:  No Known Allergies    Current Outpatient Medications   Medication Instructions    albuterol (PROVENTIL/VENTOLIN HFA) 90 mcg/actuation inhaler No dose, route, or frequency recorded.    ATROVENT HFA 17 mcg/actuation inhaler No dose, route, or frequency recorded.    baclofen (LIORESAL) 20 MG tablet No dose, route, or frequency recorded.    DULoxetine (CYMBALTA) 30 MG capsule No dose, route, or frequency recorded.    enalapril (VASOTEC) 20 MG tablet No dose, route, or frequency recorded.    finasteride (PROSCAR) 5 mg tablet No dose, route, or frequency recorded.    ibuprofen (ADVIL,MOTRIN) 100 mg/5 mL suspension No dose, route, or frequency recorded.    omeprazole (PRILOSEC) 40 MG capsule No dose, route, or frequency recorded.    predniSONE (DELTASONE) 20 MG tablet No dose, route, or frequency recorded.    rosuvastatin (CRESTOR) 20 MG tablet No dose, route, or frequency recorded.    VITAMIN D2 50,000 unit capsule No dose, route, or  "frequency recorded.       Past Surgical History:   Procedure Laterality Date    FRACTURE SURGERY      SPINE SURGERY  09/04/2019    ACDF @ C3/4, 09/04/19, [facility], DR. SIN.    SPINE SURGERY  10/18/2019    C3-6 DCL, [facility], DR. SIN.       Social History[2]    BP (!) 158/95 (Patient Position: Lying)   Pulse 83   Temp 36.7 °C (98 °F) (Oral)   Resp 19   Ht 6' 1" (1.854 m)   Wt 70.1 kg (154 lb 8.7 oz)   SpO2 98%   BMI 20.39 kg/m²       Physical Exam  General: Well nourished and Cooperative    Airway:  Mallampati: II   Mouth Opening: Normal  TM Distance: Normal  Tongue: Normal  Neck ROM: Normal ROM    Dental:  Edentulous    Chest/Lungs:  Clear to auscultation    Heart:  Rhythm: Regular Rhythm        Lab Results   Component Value Date    WBC 13.74 (H) 04/06/2025    HGB 14.2 04/06/2025    HCT 41.8 (L) 04/06/2025    MCV 80.2 04/06/2025     04/06/2025          BMP  Lab Results   Component Value Date    HCT 41.8 (L) 04/06/2025     04/07/2025    K 3.5 04/07/2025    BUN 16.9 04/07/2025    CREATININE 1.31 (H) 04/07/2025    CALCIUM 8.2 (L) 04/07/2025        INR  Recent Labs     04/06/25  1849 04/06/25  2158 04/07/25  0204 04/07/25  0814   INR 1.0 1.2  --   --    APTT 30.3 >200.0* 90.9* 69.5*         Diagnostic Studies:    4/7/25      Left Ventricle: The left ventricle is normal in size. There is concentric remodeling. Moderate global hypokinesis present. Septal motion is abnormal is consistent with bundle branch block. There is moderately reduced systolic function with a visually estimated ejection fraction of 30 - 40%. Grade I diastolic dysfunction.    Right Ventricle: The right ventricle is normal in size. Systolic function is normal.    Left Atrium: Mildly dilated    Aortic Valve: Aortic valve peak velocity is 1.1 m/s. Mean gradient is 3 mmHg.  .  EKG  Results for orders placed or performed during the hospital encounter of 04/06/25   EKG 12-lead    Collection Time: 04/06/25  6:51 PM   Result Value Ref Range    QRS " Duration 152 ms    [facility] QTC Calculation 515 ms    Narrative    Test Reason : M79.605,    Vent. Rate :  87 BPM     Atrial Rate :  87 BPM     P-R Int : 220 ms          QRS Dur : 152 ms      QT Int : 428 ms       P-R-T Axes :    -82  91 degrees    QTcB Int : 515 ms    Sinus rhythm with 1st degree A-V block with Premature atrial complexes with  Aberrant conduction  Right bundle branch block  Left anterior fascicular block   Bifascicular block   LVH with repolarization abnormality ( R in aVL , Romhilt-Sanchez )  Inferior infarct ,age undetermined  Anterolateral infarct ,age undetermined  Abnormal ECG  No previous ECGs available    Referred By: AAAREFERRAL SELF           Confirmed By:          Anesthesia Plan  Type of Anesthesia, risks & benefits discussed:    Anesthesia Type: Gen ETT  Intra-op Monitoring Plan: Standard ASA Monitors  Post Op Pain Control Plan: multimodal analgesia  Induction:  IV  Informed Consent: Informed consent signed with the Patient and all parties understand the risks and agree with anesthesia plan.  All questions answered.   ASA Score: 3 Emergent  Day of Surgery Review of History & Physical: H&P Update referred to the surgeon/provider.    Ready For Surgery From Anesthesia Perspective.     .  Anesthesia consent includes material facts, risks, complications & alternatives, and possibility of altering the anesthesia plan due to intraoperative conditions.    I reviewed problem list, prior to admission medication list, appropriate labs, any workup, Xray, EKG etc   See anesthesia chart for details of the anesthesia plan carried out.       Pete Tatum MD    ¤                [1]   Patient Active Problem List  Diagnosis    Cervical stenosis of spinal canal    Contusion of cervical cord    Bilateral arm weakness    S/P cervical spinal fusion    Tobacco dependency    Hyponatremia    Thrombocytopenia    Arterial embolus of lower extremity   [2]   Social History  Socioeconomic History    Marital status:  Single   Tobacco Use    Smoking status: Every Day     Current packs/day: 0.50     Types: Cigarettes    Smokeless tobacco: Never   Substance and Sexual Activity    Alcohol use: Never    Drug use: Yes

## 2025-04-07 NOTE — H&P
[facility] MEDICINE - H&P ADMISSION NOTE      Patient Name: Prasanna Whiting  MRN: [id number]  Patient Class: IP- Inpatient   Admission Date: 4/6/2025   Admitting Physician: KENDY Service   Attending Physician: Thairy G Reyes, DO  Primary Care Provider: Mary, Primary Doctor  Face-to-Face encounter date: 04/07/2025        CHIEF COMPLAINT     Chief Complaint   Patient presents with    Leg Pain     Tx from [place] for Interventional cardiology , L leg pain, US at prior hospital showed no flow to Lower extremity , on heparin infusion   Hx: DVT       HISTORY OF PRESENTING ILLNESS   69-year-old male with no known medical condition as he states he has not seen a physician in 4 years.  Prior to that he says he took a proximally 15 medications on a daily basis but has not taken anything for the last 4 years.  Patient presents today with complaint of left lower extremity burning that started yesterday.  PAST MEDICAL HISTORY     Past Medical History:   Diagnosis Date    Contusion of cervical cord     Hepatitis     Hypertension     Mental disorder        PAST SURGICAL HISTORY     Past Surgical History:   Procedure Laterality Date    FRACTURE SURGERY      SPINE SURGERY  09/04/2019    ACDF @ C3/4, 09/04/19, [facility], DR. SIN.    SPINE SURGERY  10/18/2019    C3-6 DCL, [facility], DR. SIN.       FAMILY HISTORY   Reviewed and noncontributory to this case    SOCIAL HISTORY   Social History[1]    HOME MEDICATIONS     Prior to Admission medications    Medication Sig Start Date End Date Taking? Authorizing Provider   albuterol (PROVENTIL/VENTOLIN HFA) 90 mcg/actuation inhaler  7/16/19   Provider, Historical   ATROVENT HFA 17 mcg/actuation inhaler  7/15/19   Provider, Historical   baclofen (LIORESAL) 20 MG tablet  7/10/19   Provider, Historical   DULoxetine (CYMBALTA) 30 MG capsule  7/3/19   Provider, Historical   enalapril (VASOTEC) 20 MG tablet  7/3/19   Provider, Historical   finasteride (PROSCAR) 5 mg tablet   7/15/19   Provider, Historical   ibuprofen (ADVIL,MOTRIN) 100 mg/5 mL suspension  7/15/19   Provider, Historical   omeprazole (PRILOSEC) 40 MG capsule  7/15/19   Provider, Historical   predniSONE (DELTASONE) 20 MG tablet  7/12/19   Provider, Historical   rosuvastatin (CRESTOR) 20 MG tablet  7/18/19   Provider, Historical   VITAMIN D2 50,000 unit capsule  6/24/19   Provider, Historical       ALLERGIES   Patient has no known allergies.          REVIEW OF SYSTEMS   Except as documented above, all other systems reviewed and negative    PHYSICAL EXAM     Vitals:    04/07/25 0207   BP: (!) 170/113   Pulse: 83   Resp: 13   Temp:       General:  In no acute distress, resting comfortably  Head and neck:  Atraumatic, normocephalic, moist mucous membranes, supple neck  Chest:  Clear to auscultation bilaterally  Heart:  S1, S2, no appreciable murmur  Abdomen:  Soft, nontender, BS +  MSK:  Left lower extremity cold up to the knee  Neuro:  Alert and oriented x4, moving all extremities with good strength  ASSESSMENT AND PLAN   Limb ischemia   L Internal iliac  L common femoral - total occlusion   -heparin gtt  -vascular surgery consulted, this gentleman has severe vasculopathy--> obtain carotid US    Distal Thoracic Aortic Thrombus  SMA Occlusion  -c/w heparin drip    NSTEMI  -on heparin gtt for problem #1  -trend trop  -pending echo    Essential Hypertension   -started on nifedipine    AROLDO vs CKD  -improving, encourage PO fluids     Renal Mass  -L lower pole, may represent neoplasia vs pyelo  -pending ua  -pt is asymptomatic, has mild leukocytosis but likely reactive 2/2 limb ischemia  -follow culture in the meantime, monitor off antibiotics    Adrenal Adenoma  -incidental finding, stable from prior study     Tobacco dependence  -1.5 packs per day    Critical care time greater than 35 minutes for vasculopathy requiring heparin drip    DVT prophylaxis:  heparin gtt  Screening for Social Drivers for health:  Patient screened for  food insecurity, housing instability, transportation needs, utility difficulties, and interpersonal safety (select all that apply as identified as concern)  []Housing or Food  []Transportation Needs  []Utility Difficulties  []Interpersonal safety  [x]None  __________________________________________________________________  LABS/MICRO/MEDS/DIAGNOSTICS       LABS  Recent Labs     04/07/25  0006      K 3.5   CO2 20*   BUN 16.9   CREATININE 1.31*   GLUCOSE 107   CALCIUM 8.2*   ALKPHOS 40   AST 11   ALT 5   ALBUMIN 2.7*     Recent Labs     04/06/25  2158   WBC 13.74*   RBC 5.21   HCT 41.8*   MCV 80.2          MICROBIOLOGY  Microbiology Results (last 7 days)       ** No results found for the last 168 hours. **            MEDICATIONS   atorvastatin  80 mg Oral Daily    heparin (PORCINE)  80 Units/kg Intravenous Once    NIFEdipine  30 mg Oral Daily      INFUSIONS   heparin (porcine) in D5W  0-40 Units/kg/hr Intravenous Continuous 13.1 mL/hr at 04/06/25 2212 18 Units/kg/hr at 04/06/25 2212       DIAGNOSTIC TESTS  CTA Runoff ABD PEL Bilat Lower Ext                Patient information was obtained from patient, patient's family, past medical records and ER records.   All diagnosis and differential diagnosis have been reviewed; assessment and plan has been documented. I have personally reviewed the labs and test results that are presently available; I have reviewed the patients medication list. I have reviewed the consulting providers response and recommendations. I have reviewed or attempted to review medical records based upon their availability.  All of the patient's questions have been addressed and answered. Patient's is agreeable to the above stated plan. I will continue to monitor closely and make adjustments to medical management as needed.  This note was created using [employer] voice recognition software that occasionally misinterpreted phrases or words.  Please contact me if any questions may rise regarding  documentation to clarify verbiage.        Thairy G Reyes, DO   Internal Medicine  Department of [facility] Medicine  [facility] - Emergency Dept       [1]   Social History  Socioeconomic History    Marital status: Single   Tobacco Use    Smoking status: Every Day     Current packs/day: 0.50     Types: Cigarettes    Smokeless tobacco: Never   Substance and Sexual Activity    Alcohol use: Never    Drug use: Yes

## 2025-04-07 NOTE — PROGRESS NOTES
[facility] Medicine Progress Note        Chief Complaint: Inpatient Follow-up     HPI:   69-year-old male with no known medical condition as he states he has not seen a physician in 4 years. Prior to that he says he took a proximally 15 medications on a daily basis but has not taken anything for the last 4 years. Patient presents today with complaint of left lower extremity burning that started yesterday.     Interval Hx:  Patient agitated this morning.  States that he is leaving and that he wants to sign out AMA.  That he is calling a ride.  He is noted to have a elevated blood pressure.  Nursing requesting IV antihypertensives.  His pain is controlled.  Currently on a heparin drip.  Discussed with them why he was so agitated.  He states that we keep looking in his heart and that has not problem.  Discussed with him that we know that he has blockage in his lower extremity but he also has strain on his heart that we are monitoring.  He states that he wants to leave.  Discussed with him that he is not here under his own will and that he can sign out whenever he wants.  I questioned him that if he does leave in his leg continues to worsen what his plan is.  States that if his this time to die then so be it.      Objective/physical exam:  General: In no acute distress, afebrile  Chest: Clear to auscultation bilaterally  Heart: RRR, +S1, S2, no appreciable murmur  Abdomen: Soft, nontender, BS +  MSK:  Decreased pulses in left lower extremity.  Tender  Neurologic: Alert and oriented x4, Cranial nerve II-XII intact, Strength 5/5 in all 4 extremities    VITAL SIGNS: 24 HRS MIN & MAX LAST   Temp  Min: 97.5 °F (36.4 °C)  Max: 97.8 °F (36.6 °C) 97.5 °F (36.4 °C)   BP  Min: 139/95  Max: 219/151 (!) 182/119   Pulse  Min: 76  Max: 97  87   Resp  Min: 11  Max: 24 16   SpO2  Min: 95 %  Max: 99 % 97 %       Recent Labs   Lab 04/06/25  1849 04/06/25  2158   WBC 12.60* 13.74*   RBC 5.35 5.21    HGB 14.5 14.2   HCT 43.0 41.8*   MCV 80.4 80.2   MCH 27.1 27.3   MCHC 33.7 34.0   RDW 15.9 15.9    148   MPV 10.2 11.8*       Recent Labs   Lab 04/06/25  1849 04/07/25  0006   * 136   K 3.5 3.5    105   CO2 21* 20*   BUN 21.0 16.9   CREATININE 1.82* 1.31*   CALCIUM 8.7* 8.2*   ALBUMIN 3.0* 2.7*   ALKPHOS 48 40   ALT 8 5   AST 12 11   BILITOT 0.4 0.4          Microbiology Results (last 7 days)       ** No results found for the last 168 hours. **             Radiology:  CTA Runoff ABD PEL Bilat Lower Ext  START OF REPORT:  Technique: CT Angiogram abdomen and pelvis with and without contrast. Additionally a CTA Abdominal aorta with runoff was performed.    Comparison: Comparison is with study dated 2024-08-19 19:33:57.    Clinical History: Arterialembolism,lowerextremity,Claudicationorlegischemia,leftlegpain.    Dosage Information: Automated Exposure Control was utilized 1833.99 mGy.cm.    Findings:  Lines and Tubes: None.  Thorax:  Lungs: There is mild nonspecific dependent change at the lung bases. No focal infiltrate or consolidation is seen.  Pleura: No effusions or thickening.  Heart: The heart size is within normal limits. Moderate coronary artery calcification is seen.  Abdomen:  Abdominal Wall: No abdominal wall pathology is seen.  Liver: The liver appears unremarkable.  Biliary System: No intrahepatic or extrahepatic biliary duct dilatation is seen.  Gallbladder: The gallbladder appears unremarkable.  Pancreas: Mild pancreatic atrophy is seen.  Spleen: The spleen appears unremarkable.  Adrenals: There is no significant interval change in the 2.4 cm low attenuation nodule at the medial limb of the right adrenal gland on image 21 series 4.  Kidneys: There is focal cortical thinning in the right kidney consistent with scarring. There is interval progession in the focal areas of decreased enhancement with associated mild perinephric fat stranding in the lower pole of the left  kidney.  Bowel:  Esophagus: The visualized esophagus appears unremarkable.  Stomach: The stomach appears unremarkable.  Duodenum: Unremarkable appearing duodenum.  Small Bowel: The small bowel appears unremarkable.  Colon: Nondistended.  Appendix: The appendix is not identified but no inflammatory changes are seen in the right lower quadrant to suggest appendicitis.  Peritoneum: No intraperitoneal free air or ascites is seen.    Pelvis:  Bladder: Th bladder appears unremarkable.  Male:  Prostate gland: There are a few calcifications in the prostate gland.    Bony structures:  Dorsal Spine: There is moderate spondylosis of the visualized dorsal spine.  Bony Pelvis: There is mild degenerative change of the bilateral hips.  Hip: The visualized structures of the hip appear unremarkable.  Lower extremity bony structures: The bones of the left lower extremity appear intact. The bones of the right lower extremity appear intact.    Vascular Structures:  Aorta: There is moderate calcification of the abdominal aorta. There is peripherally located thrombus in the distal thoracic aorta through to the suprailiac aorta with associated mild to moderate luminal narrowing. There is total occlusion of the superior mesenteric artery on image 146 series 16, inferior mesenteric artery on image 257 as well as right common iliac artery. There is no enhancement noted in the right internal and external iliac arteries.  Iliac Arteries: Moderate atheromatous calcification are seen in the bilateral iliac arteries and branches.  Left Common Iliac Artery: Stent is seen in place.  Left internal iliac artery: There is total occlusion of the left internal iliac artery at its origin without enhancement in the distal segment.  Right Lower extremity arteries: The early phase imaging does not demonstrate flow in the right lower extremity arteries. The delayed phase images (series 37) demonstrates some reconstituted flow in the right distal superficial  femoral artery and popliteal artery with pronounced calcification of the trifurcation vessels but with intermittent identification of contrast in the proximal trifurcation vessels with some contrast in the peroneal artery at the ankle with some reconstituted anterior and posterior tibial flow at the ankle as well.  Left Lower extremity arteries: There is total occlusion of the left common femoral artery on image 203 series 15 without enhancement in the left superficial femoral, popliteal and trifurcation vessels. The delayed phase images show no flow in the left popliteal artery with possibly some minimal patchy reconstituted flow in scattered short segments of the trifurcation vessels with posterior tibial artery flow at the ankle.    Notifications: The results were discussed with the emergency room physician (Dr Craven) prior to dictation at 2025-04-06 23:43:04 CDT.    Impression:  1. There is no significant interval change in the 2.4 cm low attenuation nodule at the medial limb of the right adrenal gland on image 21 series 4. Correlate with clinical and laboratory findings as regards further evaluation and follow-up.  2. There is interval progession in the focal areas of decreased enhancement with associated mild perinephric fat stranding in the lower pole of the left kidney. This may represent a non expansile neoplastic process, pyelonephritis is not entirely excluded. Correlate with clinical and laboratory findings as regards further evaluation and follow-up.  3. There is peripherally located thrombus in the distal thoracic aorta through to the suprailiac aorta with associated mild to moderate luminal narrowing. There is total occlusion of the superior mesenteric artery on image 146 series 16, inferior mesenteric artery on image 257 as well as right common iliac artery. There is no enhancement noted in the right internal and external iliac arteries.  4. There is total occlusion of the left internal iliac  artery at its origin without enhancement in the distal segment.  5. There is total occlusion of the left common femoral artery on image 203 series 15 without enhancement in the left superficial femoral, popliteal and trifurcation vessels. The delayed phase images show no flow in the left popliteal artery with possibly some minimal patchy reconstituted flow in scattered short segments of the trifurcation vessels with posterior tibial artery flow at the ankle. Correlate clinically as regards additional evaluation and follow-up possibly with digital subtraction angiography if clinically indicated.  6. The early phase imaging does not demonstrate flow in the right lower extremity arteries. The delayed phase images (series 37) demonstrates some reconstituted flow in the right distal superficial femoral artery and popliteal artery with pronounced calcification of the trifurcation vessels but with intermittent identification of contrast in the proximal trifurcation vessels with some contrast in the peroneal artery at the ankle with some reconstituted anterior and posterior tibial flow at the ankle as well. Consider follow-up with digital subtraction angiography if clinically indicated.  7. Details and other findings as discussed above.  US Lower Extremity Arteries Left  Narrative: EXAMINATION:  US LOWER EXTREMITY ARTERIES LEFT    CLINICAL HISTORY:  Pain in left leg    COMPARISON:  None    FINDINGS:  No flow is seen in the common femoral artery the superficial femoral artery, the popliteal artery the peroneal, anterior and posterior tibial artery.  Impression: No flow is demonstrated in the arteries of the left lower extremity    Electronically signed by: Shamir Russo MD  Date:    04/06/2025  Time:    19:41        Medications:  Scheduled Meds:   atorvastatin  80 mg Oral Daily    heparin (PORCINE)  80 Units/kg Intravenous Once    magnesium sulfate 2 g IVPB  2 g Intravenous Once    NIFEdipine  30 mg Oral Daily     Continuous  Infusions:   heparin (porcine) in D5W  0-40 Units/kg/hr Intravenous Continuous 13.1 mL/hr at 04/06/25 2212 18 Units/kg/hr at 04/06/25 2212     PRN Meds:.  Current Facility-Administered Medications:     acetaminophen, 650 mg, Oral, Q4H PRN    albuterol-ipratropium, 3 mL, Nebulization, Q6H PRN    aluminum-magnesium hydroxide-simethicone, 30 mL, Oral, QID PRN    bisacodyL, 10 mg, Rectal, Daily PRN    dextrose 50%, 12.5 g, Intravenous, PRN    dextrose 50%, 25 g, Intravenous, PRN    glucagon (human recombinant), 1 mg, Intramuscular, PRN    glucose, 16 g, Oral, PRN    glucose, 24 g, Oral, PRN    heparin (PORCINE), 60 Units/kg, Intravenous, PRN    heparin (PORCINE), 30 Units/kg, Intravenous, PRN    hydrALAZINE, 10 mg, Intravenous, Q4H PRN    HYDROcodone-acetaminophen, 1 tablet, Oral, Q6H PRN    labetalol, 10 mg, Intravenous, QID PRN    melatonin, 9 mg, Oral, Nightly PRN    morphine, 2 mg, Intravenous, Q6H PRN    naloxone, 0.02 mg, Intravenous, PRN    nicotine, 1 patch, Transdermal, Daily PRN    ondansetron, 8 mg, Oral, Q8H PRN    ondansetron, 4 mg, Intravenous, Q8H PRN    polyethylene glycol, 17 g, Oral, TID PRN    simethicone, 1 tablet, Oral, QID PRN    sodium chloride 0.9%, 10 mL, Intravenous, PRN    Nutrition:  Nutrition consulted. Most recent weight and BMI monitored-     Measurements:  Wt Readings from Last 1 Encounters:   04/06/25 72.6 kg (160 lb)   Body mass index is 21.11 kg/m².    Patient has been screened and assessed by RD.    Malnutrition Type:  Context:    Level:      Malnutrition Characteristic Summary:       Interventions/Recommendations (treatment strategy):           Assessment/Plan:   Left internal iliac/common femoral occlusion   Left lower extremity ischemia   Distal thoracic aortic thrombus   SMA occlusion   NSTEMI, type 2 demand ischemia   Hypertensive urgency   Acute on chronic kidney disease, unknown baseline   Adrenal adenoma   Tobacco abuse     Patient is currently on a heparin drip.  Awaiting  vascular surgery evaluation and plan.    Patient is noncompliant with medications as an outpatient.  Will get him a nicotine patch and some analgesia to see if this will calm him down he will stay to get treatment but he is free to sign out if he so chooses.  He understands the risk of further loss of limb or death.  Will add some p.r.n. IV antihypertensives for now.  NPO for surgery evaluation    Critical care diagnosis:  Critical limb ischemia requiring heparin drip  Critical care interventions: hands on evaluation, review of labs/radiographs/records and discussions with family  Critical care time spent: >32 minutes    71 minutes + prolonged care time spent = 31 minutes.  The total time spent = 102 minutes        Yifan Cottrell MD   04/07/2025     All diagnosis and differential diagnosis have been reviewed; assessment and plan has been documented; I have personally reviewed the labs and test results that are presently available; I have reviewed the patients medication list; I have reviewed the consulting providers response and recommendations. I have reviewed or attempted to review medical records based upon their availability    All of the patient's questions have been  addressed and answered. Patient's is agreeable to the above stated plan. I will continue to monitor closely and make adjustments to medical management as needed.  _____________________________________________________________________

## 2025-04-07 NOTE — ED PROVIDER NOTES
Encounter Date: 4/6/2025    SCRIBE #1 NOTE: I, James Troncosobert, am scribing for, and in the presence of,  Arvind Craven DO. I have scribed the following portions of the note - Other sections scribed: HPI, ROS, PE.       History     Chief Complaint   Patient presents with    Leg Pain     Tx from [place] for Interventional cardiology , L leg pain, US at prior hospital showed no flow to Lower extremity , on heparin infusion   Hx: DVT     Patient is a 70 y/o male with a hx of hepatitis and HTN who presents to the ED as a transfer from [place] for vascular surgery. Pt states beginning yesterday he began having pain to his left lower extremity and noticed it was cold to the touch so he went to [place] ED. Ultrasound of the left lower extremity done at [place] showing arterial occlusion. Pt placed on 18 u heparin infusion and given 4 mg morphine and 4 mg zofran prior to transfer for [facility]. Pt currently endorses pain to his left leg but denies any chest pain.     The history is provided by the patient and medical records. No  was used.     Review of patient's allergies indicates:  No Known Allergies  Past Medical History:   Diagnosis Date    Contusion of cervical cord     Hepatitis     Hypertension     Mental disorder      Past Surgical History:   Procedure Laterality Date    FRACTURE SURGERY      SPINE SURGERY  09/04/2019    ACDF @ C3/4, 09/04/19, [facility], DR. SIN.    SPINE SURGERY  10/18/2019    C3-6 DCL, [facility], DR. SIN.     Family History   Problem Relation Name Age of Onset    Heart disease Mother      Cancer Father       Social History[1]  Review of Systems   Constitutional:  Negative for chills, diaphoresis, fatigue and fever.   HENT:  Negative for ear pain, facial swelling, hearing loss, nosebleeds and sore throat.    Eyes:  Negative for visual disturbance.   Cardiovascular:  Negative for chest pain.   Gastrointestinal:  Negative for abdominal pain, blood in stool, nausea and vomiting.   Endocrine:  Negative for polyuria.   Genitourinary:  Negative for dysuria and hematuria.   Musculoskeletal:  Positive for myalgias. Negative for back pain.   Skin:  Negative for color change and rash.   Allergic/Immunologic: Negative for immunocompromised state.   Neurological:  Negative for dizziness, weakness and headaches.   Hematological:  Does not bruise/bleed easily.       Physical Exam     Initial Vitals [04/06/25 2127]   BP Pulse Resp Temp SpO2   (!) 161/104 83 16 97.5 °F (36.4 °C) 99 %      MAP       --         Physical Exam    Constitutional: He appears well-developed and well-nourished. He is cooperative.  Non-toxic appearance. He appears ill.   Chronically ill appearing    HENT:   Head: Normocephalic and atraumatic. Mouth/Throat: Uvula is midline, oropharynx is clear and moist and mucous membranes are normal.   Eyes: Conjunctivae, EOM and lids are normal. Pupils are equal, round, and reactive to light.   Neck: Trachea normal and phonation normal. Neck supple.    Full passive range of motion without pain.     Cardiovascular:  Normal rate, regular rhythm and normal heart sounds.           No murmur heard.  Pulses:       Dorsalis pedis pulses are 0 on the left side.        Posterior tibial pulses are 0 on the left side.   Pulmonary/Chest: No accessory muscle usage. No tachypnea. No respiratory distress. He has no decreased breath sounds. He has no wheezes. He has no rhonchi. He has no rales.   Abdominal: Abdomen is soft. Bowel sounds are normal. He exhibits no distension. There is no abdominal tenderness. No hernia. There is no rebound and no guarding.   Musculoskeletal:      Cervical back: Full passive range of motion without pain and neck supple.     Neurological: He is alert and oriented to person, place, and time. GCS eye subscore is 4. GCS verbal subscore is 5. GCS motor subscore is 6.   Skin: Skin is dry and intact. Capillary refill takes less than 2 seconds. No rash noted.   Left lower extremity cold to the  touch with lower extremity modeling   Psychiatric: He has a normal mood and affect. His speech is normal and behavior is normal. Judgment and thought content normal. Cognition and memory are normal.         ED Course   Critical Care    Date/Time: 4/6/2025 11:56 PM    Performed by: Arvind Craven DO  Authorized by: Reyes, Thairy G, DO  Direct patient critical care time: 20 minutes  Additional history critical care time: 5 minutes  Ordering / reviewing critical care time: 5 minutes  Documentation critical care time: 5 minutes  Consulting other physicians critical care time: 5 minutes  Total critical care time (exclusive of procedural time) : 40 minutes  Critical care time was exclusive of separately billable procedures and treating other patients.  Critical care was necessary to treat or prevent imminent or life-threatening deterioration of the following conditions: circulatory failure.  Critical care was time spent personally by me on the following activities: development of treatment plan with patient or surrogate, discussions with consultants, evaluation of patient's response to treatment, examination of patient, obtaining history from patient or surrogate, ordering and performing treatments and interventions, ordering and review of laboratory studies, ordering and review of radiographic studies, pulse oximetry, re-evaluation of patient's condition and review of old charts.        Labs Reviewed   CBC WITH DIFFERENTIAL - Abnormal       Result Value    WBC 13.74 (*)     RBC 5.21      Hgb 14.2      Hct 41.8 (*)     MCV 80.2      MCH 27.3      MCHC 34.0      RDW 15.9      Platelet 148      MPV 11.8 (*)     Neut % 64.6      Lymph % 25.1      Mono % 8.3      Eos % 1.1      Basophil % 0.4      Imm Grans % 0.5      Neut # 8.88      Lymph # 3.45      Mono # 1.14      Eos # 0.15      Baso # 0.05      Imm Gran # 0.07 (*)     NRBC% 0.0     APTT - Abnormal    PTT >200.0 (*)    PROTIME-INR - Abnormal    PT 15.7 (*)     INR  1.2      Narrative:     Protimes are used to monitor anticoagulant agents such as warfarin. PT INR values are based on the current patient normal mean and the JULIO value for the specific instrument reagent used.  **Routine theraputic target values for the INR are 2.0-3.0**   CBC W/ AUTO DIFFERENTIAL    Narrative:     The following orders were created for panel order CBC auto differential.  Procedure                               Abnormality         Status                     ---------                               -----------         ------                     CBC with Differential[[id number]]       Abnormal            Final result                 Please view results for these tests on the individual orders.   COMPREHENSIVE METABOLIC PANEL          Imaging Results              CTA Runoff ABD PEL Bilat Lower Ext (Preliminary result)  Result time 04/06/25 23:48:33      Preliminary result by Rodrigo Chambers MD (04/06/25 23:48:33)                   Narrative:    START OF REPORT:  Technique: CT Angiogram abdomen and pelvis with and without contrast. Additionally a CTA Abdominal aorta with runoff was performed.    Comparison: Comparison is with study dated 2024-08-19 19:33:57.    Clinical History: Arterialembolism,lowerextremity,Claudicationorlegischemia,leftlegpain.    Dosage Information: Automated Exposure Control was utilized 1833.99 mGy.cm.    Findings:  Lines and Tubes: None.  Thorax:  Lungs: There is mild nonspecific dependent change at the lung bases. No focal infiltrate or consolidation is seen.  Pleura: No effusions or thickening.  Heart: The heart size is within normal limits. Moderate coronary artery calcification is seen.  Abdomen:  Abdominal Wall: No abdominal wall pathology is seen.  Liver: The liver appears unremarkable.  Biliary System: No intrahepatic or extrahepatic biliary duct dilatation is seen.  Gallbladder: The gallbladder appears unremarkable.  Pancreas: Mild pancreatic atrophy is seen.  Spleen:  The spleen appears unremarkable.  Adrenals: There is no significant interval change in the 2.4 cm low attenuation nodule at the medial limb of the right adrenal gland on image 21 series 4.  Kidneys: There is focal cortical thinning in the right kidney consistent with scarring. There is interval progession in the focal areas of decreased enhancement with associated mild perinephric fat stranding in the lower pole of the left kidney.  Bowel:  Esophagus: The visualized esophagus appears unremarkable.  Stomach: The stomach appears unremarkable.  Duodenum: Unremarkable appearing duodenum.  Small Bowel: The small bowel appears unremarkable.  Colon: Nondistended.  Appendix: The appendix is not identified but no inflammatory changes are seen in the right lower quadrant to suggest appendicitis.  Peritoneum: No intraperitoneal free air or ascites is seen.    Pelvis:  Bladder: Th bladder appears unremarkable.  Male:  Prostate gland: There are a few calcifications in the prostate gland.    Bony structures:  Dorsal Spine: There is moderate spondylosis of the visualized dorsal spine.  Bony Pelvis: There is mild degenerative change of the bilateral hips.  Hip: The visualized structures of the hip appear unremarkable.  Lower extremity bony structures: The bones of the left lower extremity appear intact. The bones of the right lower extremity appear intact.    Vascular Structures:  Aorta: There is moderate calcification of the abdominal aorta. There is peripherally located thrombus in the distal thoracic aorta through to the suprailiac aorta with associated mild to moderate luminal narrowing. There is total occlusion of the superior mesenteric artery on image 146 series 16, inferior mesenteric artery on image 257 as well as right common iliac artery. There is no enhancement noted in the right internal and external iliac arteries.  Iliac Arteries: Moderate atheromatous calcification are seen in the bilateral iliac arteries and  branches.  Left Common Iliac Artery: Stent is seen in place.  Left internal iliac artery: There is total occlusion of the left internal iliac artery at its origin without enhancement in the distal segment.  Right Lower extremity arteries: The early phase imaging does not demonstrate flow in the right lower extremity arteries. The delayed phase images (series 37) demonstrates some reconstituted flow in the right distal superficial femoral artery and popliteal artery with pronounced calcification of the trifurcation vessels but with intermittent identification of contrast in the proximal trifurcation vessels with some contrast in the peroneal artery at the ankle with some reconstituted anterior and posterior tibial flow at the ankle as well.  Left Lower extremity arteries: There is total occlusion of the left common femoral artery on image 203 series 15 without enhancement in the left superficial femoral, popliteal and trifurcation vessels. The delayed phase images show no flow in the left popliteal artery with possibly some minimal patchy reconstituted flow in scattered short segments of the trifurcation vessels with posterior tibial artery flow at the ankle.    Notifications: The results were discussed with the emergency room physician (Dr Craven) prior to dictation at 2025-04-06 23:43:04 CDT.      Impression:  1. There is no significant interval change in the 2.4 cm low attenuation nodule at the medial limb of the right adrenal gland on image 21 series 4. Correlate with clinical and laboratory findings as regards further evaluation and follow-up.  2. There is interval progession in the focal areas of decreased enhancement with associated mild perinephric fat stranding in the lower pole of the left kidney. This may represent a non expansile neoplastic process, pyelonephritis is not entirely excluded. Correlate with clinical and laboratory findings as regards further evaluation and follow-up.  3. There is  peripherally located thrombus in the distal thoracic aorta through to the suprailiac aorta with associated mild to moderate luminal narrowing. There is total occlusion of the superior mesenteric artery on image 146 series 16, inferior mesenteric artery on image 257 as well as right common iliac artery. There is no enhancement noted in the right internal and external iliac arteries.  4. There is total occlusion of the left internal iliac artery at its origin without enhancement in the distal segment.  5. There is total occlusion of the left common femoral artery on image 203 series 15 without enhancement in the left superficial femoral, popliteal and trifurcation vessels. The delayed phase images show no flow in the left popliteal artery with possibly some minimal patchy reconstituted flow in scattered short segments of the trifurcation vessels with posterior tibial artery flow at the ankle. Correlate clinically as regards additional evaluation and follow-up possibly with digital subtraction angiography if clinically indicated.  6. The early phase imaging does not demonstrate flow in the right lower extremity arteries. The delayed phase images (series 37) demonstrates some reconstituted flow in the right distal superficial femoral artery and popliteal artery with pronounced calcification of the trifurcation vessels but with intermittent identification of contrast in the proximal trifurcation vessels with some contrast in the peroneal artery at the ankle with some reconstituted anterior and posterior tibial flow at the ankle as well. Consider follow-up with digital subtraction angiography if clinically indicated.  7. Details and other findings as discussed above.                                         Medications   heparin 25,000 units in dextrose 5% (100 units/ml) IV bolus from bag HIGH INTENSITY nomogram - LAF ( Intravenous Canceled Entry 4/6/25 2200)   heparin 25,000 units in dextrose 5% 250 mL (100 units/mL)  infusion HIGH INTENSITY nomogram - LAF (18 Units/kg/hr × 72.6 kg Intravenous New Bag 4/6/25 2212)   heparin 25,000 units in dextrose 5% (100 units/ml) IV bolus from bag HIGH INTENSITY nomogram - LAF (has no administration in time range)   heparin 25,000 units in dextrose 5% (100 units/ml) IV bolus from bag HIGH INTENSITY nomogram - LAF (has no administration in time range)   morphine injection 4 mg (4 mg Intravenous Given 4/6/25 2206)   ondansetron injection 4 mg (4 mg Intravenous Given 4/6/25 2206)   sodium chloride 0.9% bolus 1,000 mL 1,000 mL (1,000 mLs Intravenous New Bag 4/6/25 2207)   iohexoL (OMNIPAQUE 350) injection 100 mL (100 mLs Intravenous Given 4/6/25 2239)     Medical Decision Making  69-year-old male presenting as a transfer for left lower extremity arterial occlusion    Differential Diagnosis:   Judging by the patient's chief complaint and pertinent history, the patient has the following possible differential diagnoses, including but not limited to the following.  Some of these are deemed to be lower likelihood and some more likely based on my physical exam and history combined with possible lab work and/or imaging studies.   Please see the pertinent studies, and refer to the HPI.  Some of these diagnoses will take further evaluation to fully rule out, perhaps as an outpatient and the patient was encouraged to follow up when discharged for more comprehensive evaluation    Arterial occlusion, chronic, acute, aortic aneurysm, bilateral arterial occlusion      Problems Addressed:  Arterial embolus of lower extremity: undiagnosed new problem with uncertain prognosis  Occlusion of superior mesenteric artery: undiagnosed new problem with uncertain prognosis  Thrombosis of thoracic aorta: undiagnosed new problem with uncertain prognosis    Amount and/or Complexity of Data Reviewed  Independent Historian: EMS  External Data Reviewed: labs, radiology and notes.     Details: Ultrasound of the left lower  extremity done at [place] showing arterial occlusion. Pt placed on 18 u heparin infusion and given 4 mg morphine and 4 mg zofran prior to transfer for [facility].   Labs: ordered. Decision-making details documented in ED Course.  Radiology: ordered and independent interpretation performed. Decision-making details documented in ED Course.    Risk  Prescription drug management.  Decision regarding hospitalization.            Scribe Attestation:   Scribe #1: I performed the above scribed service and the documentation accurately describes the services I performed. I attest to the accuracy of the note.    Attending Attestation:           Physician Attestation for Scribe:  Physician Attestation Statement for Scribe #1: I, Arvind Craven, DO, reviewed documentation, as scribed by James Hughes in my presence, and it is both accurate and complete.             ED Course as of 04/06/25 2357   Sun Apr 06, 2025   2141 Paged vascular surgery [LH]   2142 Patient presents as a transfer from [facility].  He has occlusion of the left lower extremity.  Left leg is cold, he is in quite a bit of pain and hypertensive.  I will give morphine and Zofran.  Vascular is paged [MM]   2149 Case discussed with vascular surgery.  No emergent surgeries done at this time, however patient is bed ridden so he would like patient with a CTA aorta, adequate hydration due to creatinine and admit to medicine. [MM]   2210 Case discussed with hospitalist, who does accept patient for admission.  I will wait for CT [MM]   1900 1. There is no significant interval change in the 2.4 cm low attenuation nodule at the medial limb of the right adrenal gland on image 21 series 4. Correlate with clinical and laboratory findings as regards further evaluation and follow-up.  2. There is interval progession in the focal areas of decreased enhancement with associated mild perinephric fat stranding in the lower pole of the left kidney. This may represent a non  expansile neoplastic process, pyelonephritis is not entirely excluded. Correlate with clinical and laboratory findings as regards further evaluation and follow-up.  3. There is peripherally located thrombus in the distal thoracic aorta through to the suprailiac aorta with associated mild to moderate luminal narrowing. There is total occlusion of the superior mesenteric artery on image 146 series 16, inferior mesenteric artery on image 257 as well as right common iliac artery. There is no enhancement noted in the right internal and external iliac arteries.  4. There is total occlusion of the left internal iliac artery at its origin without enhancement in the distal segment.  5. There is total occlusion of the left common femoral artery on image 203 series 15 without enhancement in the left superficial femoral, popliteal and trifurcation vessels. The delayed phase images show no flow in the left popliteal artery with possibly some minimal patchy reconstituted flow in scattered short segments of the trifurcation vessels with posterior tibial artery flow at the ankle. Correlate clinically as regards additional evaluation and follow-up possibly with digital subtraction angiography if clinically indicated. [MM]   2353 CT results as above.  Patient is on heparin, vascular surgery was consulted we will continue high-intensity heparin. [MM]      ED Course User Index  [LH] James Hughes  [MM] Arvind Craven DO                           Clinical Impression:  Final diagnoses:  [I74.3] Arterial embolus of lower extremity (Primary)  [I74.11] Thrombosis of thoracic aorta  [K55.069] Occlusion of superior mesenteric artery          ED Disposition Condition    Admit Stable                  [1]   Social History  Tobacco Use    Smoking status: Every Day     Current packs/day: 0.50     Types: Cigarettes    Smokeless tobacco: Never   Substance Use Topics    Alcohol use: Never    Drug use: Yes        Arvind Craven DO  04/06/25  [id number]

## 2025-04-07 NOTE — CONSULTS
[facility] 9th Floor Medical Telemetry  Wound Care    Patient Name:  Prasanna Whiting   MRN:  [id number]  Date: 4/7/2025  Diagnosis: Arterial embolus of lower extremity    History:     Past Medical History:   Diagnosis Date    Contusion of cervical cord     Hepatitis     Hypertension     Mental disorder        Social History[1]    Precautions:     Allergies as of 04/06/2025    (No Known Allergies)       WOC Assessment Details/Treatment             WOCN consulted for bilateral feet. Patient awake in bed, mobility impaired, pulsate bed ordered. Introduced wound care team. Educated patient on importance of turning every 2 hours to prevent skin breakdown. Patient verbalized understanding. Bilateral feet assessed. Fungal infection noted. Toe nails growing into skin. Recommend podiatry consult. Cleansed feet with remedy, left PATRICE. Wound care will follow up.     04/07/2025         [1]   Social History  Socioeconomic History    Marital status: Single   Tobacco Use    Smoking status: Every Day     Current packs/day: 0.50     Types: Cigarettes    Smokeless tobacco: Never   Substance and Sexual Activity    Alcohol use: Never    Drug use: Yes

## 2025-04-08 PROCEDURE — 25000003 PHARM REV CODE 250: Performed by: NURSE ANESTHETIST, CERTIFIED REGISTERED

## 2025-04-08 PROCEDURE — 63600175 PHARM REV CODE 636 W HCPCS: Performed by: NURSE ANESTHETIST, CERTIFIED REGISTERED

## 2025-04-08 RX ORDER — SUCCINYLCHOLINE CHLORIDE 20 MG/ML
INJECTION INTRAMUSCULAR; INTRAVENOUS
Status: DISCONTINUED | OUTPATIENT
Start: 2025-04-07 | End: 2025-04-08

## 2025-04-08 RX ORDER — VASOPRESSIN 20 [USP'U]/ML
INJECTION, SOLUTION INTRAMUSCULAR; SUBCUTANEOUS
Status: DISCONTINUED | OUTPATIENT
Start: 2025-04-07 | End: 2025-04-08

## 2025-04-08 RX ORDER — DEXAMETHASONE SODIUM PHOSPHATE 4 MG/ML
INJECTION, SOLUTION INTRA-ARTICULAR; INTRALESIONAL; INTRAMUSCULAR; INTRAVENOUS; SOFT TISSUE
Status: DISCONTINUED | OUTPATIENT
Start: 2025-04-08 | End: 2025-04-08

## 2025-04-08 RX ORDER — ONDANSETRON HYDROCHLORIDE 2 MG/ML
INJECTION, SOLUTION INTRAMUSCULAR; INTRAVENOUS
Status: DISCONTINUED | OUTPATIENT
Start: 2025-04-08 | End: 2025-04-08

## 2025-04-08 RX ADMIN — HEPARIN SODIUM 5000 UNITS: 5000 INJECTION, SOLUTION INTRAVENOUS; SUBCUTANEOUS at 12:04

## 2025-04-08 RX ADMIN — ONDANSETRON 4 MG: 2 INJECTION INTRAMUSCULAR; INTRAVENOUS at 01:04

## 2025-04-08 RX ADMIN — ROCURONIUM BROMIDE 20 MG: 10 SOLUTION INTRAVENOUS at 12:04

## 2025-04-08 RX ADMIN — SUGAMMADEX 200 MG: 100 INJECTION, SOLUTION INTRAVENOUS at 01:04

## 2025-04-08 RX ADMIN — DEXAMETHASONE SODIUM PHOSPHATE 4 MG: 4 INJECTION, SOLUTION INTRA-ARTICULAR; INTRALESIONAL; INTRAMUSCULAR; INTRAVENOUS; SOFT TISSUE at 12:04

## 2025-04-08 RX ADMIN — VASOPRESSIN 1 UNITS: 20 INJECTION INTRAVENOUS at 12:04

## 2025-04-08 NOTE — OP NOTE
Facility:[facility] [place]    Preop diagnosis: 1. Bilateral lower extremity thromboembolism with critical limb ischemia    Postop diagnosis: Same    Procedure performed: 1.  Right iliac artery thrombectomy 2. Left iliac artery/SFA thrombectomy 3.  Left popliteal/tibial thrombectomy    Surgeon: Anjum Lucero    Anesthesia: General endotracheal anesthesia    Estimated blood loss:  25 cc    Complications: None    Indications for procedure: The patient is a 69-year-old male who presented to an outside facility with left lower extremity discomfort and imaging with arterial occlusion.  He was transferred for further care and CT imaging with extensive thromboembolism of his bilateral lower extremities extending from his iliac vessels to the feet with no appreciable flow on CT imaging.  He does have scattered mural thrombus throughout his aorta as well.  Of note, he has had bed-bound status over the last 5 years following spine surgery    Procedure in detail: After informed consent was obtained the patient was taken to the operating room and placed in supine position.  He was prepped and draped in sterile fashion.  We made incision in the left groin over the femoral artery and carried this down with Bovie electrocauterization.  We identified and circumferentially dissected the femoral vessels.  11 blade scalpel was used to perform a transverse arteriotomy over the femoral bifurcation.  There was chronic disease along with the acute thrombus at this location.  We performed Dennis embolectomy proximally through the iliac artery and had removal of a large amount of thrombus with normal antegrade flow.  We performed embolectomy down the SFA with removal of a large amount of thrombus however the Dennis.  Around the distal SFA.  The profunda had severe calcified disease and a Dennis would not pass.  This was endarterectomized.  Proximally and a Dennis would not pass distally.  We made an incision medially below the knee and carried this down  with Bovie electrocauterization.  We identified the popliteal artery along with the tibial trifurcation.  A 11 blade scalpel was used to perform arteriotomy transversely at the AT takeoff.  There was acute thrombus at this location along with severe atherosclerotic disease.  A Dennis would not pass more than a few cm proximally and the popliteal artery or distally in any tibial vessel.  There was no antegrade flow are backbleeding at this level.  We then turned our attention toward the right groin.  We made incision of the femoral artery and carried this down with Bovie electrocauterization.   We identified and circumferentially dissected the femoral vessels.  11 blade scalpel was used to perform a transverse arteriotomy over the femoral bifurcation.  There was chronic disease along with the acute thrombus at this location.  We performed Dennis embolectomy proximally through the iliac artery.  This iliac is occluded to the aorta and the Dennis removed a large amount of acute thrombus and also what seemed to be fairly organized subacute or more chronic thrombus.  There was antegrade flow at this point however this was not terribly significant.  The ostium of the profunda was occluded with a calcified plaque.  This was unroofed and we are able to expose the lumen of the profunda however a Dennis would not pass as it appears to be chronically occluded.  The SFA on the right also would not pass a Dennis more than several cm.  Is evident at this point that he has no options for revascularization.  At this point the wounds were irrigated with copious fluid the wounds were closed in multiple layers with a 2-0 Vicryl suture running fashion followed by 3-0 Vicryl suture in running fashion followed by 4-0 Monocryl in subcuticular fashion followed by Dermabond.  The patient tolerated the procedure well without any issues and was transferred in stable condition to recovery room.

## 2025-04-08 NOTE — HOSPITAL COURSE
4/8/25-Patient seems to be ion better spirits today.  Waiting for further recs from vascular.  Likely amputation from previous notes.    4/9/25-Patient has been ion more pain since yesterday.  Apparently he asked nurse last night to call doctors for adjustment but he states the nurse refused.  Will adjust meds today for better relief.  Tri=sure if he needs amputation but it is highly likely that he will especially if his pain continues to worsen.

## 2025-04-08 NOTE — HPI
69-year-old male with no known medical condition as he states he has not seen a physician in 4 years. Prior to that he says he took a proximally 15 medications on a daily basis but has not taken anything for the last 4 years. Patient presents today with complaint of left lower extremity burning that started yesterday.

## 2025-04-08 NOTE — CONSULTS
[facility]  Vascular Surgery  Consult Note    Consults  Subjective:     \    History of Present Illness: 70 y/o male with h/o HTN presented to [facility] with c/o BLE discomfort.  He has discomfort at baseline; however, this was worsened over the past days.  He is bedbound following spine surgery  5 years ago.  He has had a stent placed in the past; however, he is unsure of the physian and has had no f/u.    Prescriptions Prior to Admission[1]    Review of patient's allergies indicates:  No Known Allergies    Past Medical History:   Diagnosis Date    Contusion of cervical cord     Hepatitis     Hypertension     Mental disorder      Past Surgical History:   Procedure Laterality Date    FRACTURE SURGERY      SPINE SURGERY  09/04/2019    ACDF @ C3/4, 09/04/19, [facility], DR. SIN.    SPINE SURGERY  10/18/2019    C3-6 DCL, [facility], DR. SIN.     Family History       Problem Relation (Age of Onset)    Cancer Father    Heart disease Mother          Tobacco Use    Smoking status: Every Day     Current packs/day: 0.50     Types: Cigarettes    Smokeless tobacco: Never   Substance and Sexual Activity    Alcohol use: Never    Drug use: Yes    Sexual activity: Not on file     Review of Systems   Constitutional:  Negative for activity change and fever.   HENT:  Negative for trouble swallowing.    Respiratory:  Negative for cough and shortness of breath.    Cardiovascular:  Negative for chest pain.   Gastrointestinal:  Negative for abdominal distention, abdominal pain, diarrhea, nausea and vomiting.   Genitourinary:  Negative for dysuria.   Skin:  Negative for rash.   Neurological:  Negative for dizziness, syncope, speech difficulty and weakness.     Objective:     Vital Signs (Most Recent):  Temp: 97.8 °F (36.6 °C) (04/07/25 1916)  Pulse: 90 (04/07/25 2235)  Resp: 18 (04/07/25 2235)  BP: (!) 174/107 (04/07/25 2235)  SpO2: 96 % (04/07/25 2235) Vital Signs (24h Range):  Temp:  [97.8 °F (36.6 °C)-98 °F (36.7 °C)]  97.8 °F (36.6 °C)  Pulse:  [] 90  Resp:  [11-20] 18  SpO2:  [95 %-99 %] 96 %  BP: (121-219)/() 174/107     Weight: 70.1 kg (154 lb 8.7 oz)  Body mass index is 20.39 kg/m².        Physical Exam  Constitutional:       Appearance: Normal appearance.   HENT:      Head: Normocephalic and atraumatic.   Eyes:      Extraocular Movements: Extraocular movements intact.   Cardiovascular:      Rate and Rhythm: Normal rate and regular rhythm.      Comments: Absent pedal signals  Pulmonary:      Effort: Pulmonary effort is normal. No respiratory distress.      Breath sounds: Normal breath sounds.   Abdominal:      General: Abdomen is flat. Bowel sounds are normal. There is no distension.      Palpations: Abdomen is soft.   Musculoskeletal:         General: Normal range of motion.   Skin:     Capillary Refill: Capillary refill takes less than 2 seconds.      Findings: No rash.      Comments: B feet cool   Neurological:      General: No focal deficit present.      Mental Status: He is alert and oriented to person, place, and time. Mental status is at baseline.         Significant Labs:  All pertinent labs from the last 24 hours have been reviewed.    Significant Diagnostics:  I have reviewed all pertinent imaging results/findings within the past 24 hours.    Assessment/Plan:     Active Diagnoses:    Diagnosis Date Noted POA    PRINCIPAL PROBLEM:  Arterial embolus of lower extremity [I74.3] 04/07/2025 Yes    Tobacco dependency [F17.200] 04/07/2025 Yes    Hyponatremia [E87.1] 04/07/2025 Yes    Thrombocytopenia [D69.6] 04/07/2025 Yes      Problems Resolved During this Admission:     68 y/o male with BLE thromboembolism and critical limb ischemia  -CTA reviewed and with R MILLIE/EIA/CFA./SFA/popliteal/tibial occlusion and L CFA/SFA/popliteal/tibial occlusion.  He has critical ischemia and is significant risk for B limb loss.  Discussed BLE thromboembolectomy and he wishes to proceed.  His Ble embolisms may have a cardiac nidus  as he has a depressed EF per report or may be secondary to aortic mural thrombus.    Thank you for your consult.     Anjum Lucero III, MD  Vascular Surgery  [facility]         [1]   Medications Prior to Admission   Medication Sig Dispense Refill Last Dose/Taking    albuterol (PROVENTIL/VENTOLIN HFA) 90 mcg/actuation inhaler        ATROVENT HFA 17 mcg/actuation inhaler        baclofen (LIORESAL) 20 MG tablet        DULoxetine (CYMBALTA) 30 MG capsule        enalapril (VASOTEC) 20 MG tablet        finasteride (PROSCAR) 5 mg tablet        ibuprofen (ADVIL,MOTRIN) 100 mg/5 mL suspension        omeprazole (PRILOSEC) 40 MG capsule        predniSONE (DELTASONE) 20 MG tablet        rosuvastatin (CRESTOR) 20 MG tablet        VITAMIN D2 50,000 unit capsule

## 2025-04-08 NOTE — SUBJECTIVE & OBJECTIVE
Interval History:     Review of Systems   Constitutional:  Positive for fatigue.   HENT: Negative.     Eyes: Negative.    Respiratory: Negative.     Cardiovascular: Negative.    Gastrointestinal: Negative.    Endocrine: Negative.    Genitourinary: Negative.    Musculoskeletal:  Positive for arthralgias, gait problem and myalgias.   Skin:  Positive for wound.   Allergic/Immunologic: Negative.    Hematological: Negative.    Psychiatric/Behavioral: Negative.       Objective:     Vital Signs (Most Recent):  Temp: 97.6 °F (36.4 °C) (04/08/25 1509)  Pulse: 85 (04/08/25 1509)  Resp: 19 (04/08/25 1509)  BP: 120/80 (04/08/25 1509)  SpO2: 96 % (04/08/25 1059) Vital Signs (24h Range):  Temp:  [97.2 °F (36.2 °C)-98.7 °F (37.1 °C)] 97.6 °F (36.4 °C)  Pulse:  [] 85  Resp:  [14-24] 19  SpO2:  [93 %-100 %] 96 %  BP: (120-206)/() 120/80     Weight: 70.1 kg (154 lb 8.7 oz)  Body mass index is 20.39 kg/m².    Intake/Output Summary (Last 24 hours) at 4/8/2025 1622  Last data filed at 4/8/2025 0435  Gross per 24 hour   Intake 1000 ml   Output 700 ml   Net 300 ml         Physical Exam  Constitutional:       Appearance: He is underweight. He is ill-appearing.   HENT:      Head: Normocephalic and atraumatic.      Nose: Nose normal.      Mouth/Throat:      Mouth: Mucous membranes are moist.      Pharynx: Oropharynx is clear.   Eyes:      Extraocular Movements: Extraocular movements intact.      Conjunctiva/sclera: Conjunctivae normal.      Pupils: Pupils are equal, round, and reactive to light.   Cardiovascular:      Rate and Rhythm: Normal rate and regular rhythm.      Heart sounds: Murmur heard.   Pulmonary:      Effort: Pulmonary effort is normal.      Breath sounds: Normal breath sounds.   Abdominal:      General: Bowel sounds are normal.      Palpations: Abdomen is soft.   Musculoskeletal:         General: Tenderness present. Normal range of motion.      Cervical back: Normal range of motion and neck supple.   Skin:      "General: Skin is warm and dry.      Capillary Refill: Capillary refill takes 2 to 3 seconds.      Findings: Lesion present.   Neurological:      General: No focal deficit present.      Mental Status: He is alert and oriented to person, place, and time. Mental status is at baseline.   Psychiatric:         Mood and Affect: Mood normal.         Thought Content: Thought content normal.         Judgment: Judgment normal.               Significant Labs: All pertinent labs within the past 24 hours have been reviewed.  BMP:   Recent Labs   Lab 04/07/25  0006      K 3.5      CO2 20*   BUN 16.9   CREATININE 1.31*   CALCIUM 8.2*     CBC:   Recent Labs   Lab 04/06/25 1849 04/06/25  2158   WBC 12.60* 13.74*   HGB 14.5 14.2   HCT 43.0 41.8*    148     CMP:   Recent Labs   Lab 04/06/25 1849 04/07/25  0006   * 136   K 3.5 3.5    105   CO2 21* 20*   BUN 21.0 16.9   CREATININE 1.82* 1.31*   CALCIUM 8.7* 8.2*   ALBUMIN 3.0* 2.7*   BILITOT 0.4 0.4   ALKPHOS 48 40   AST 12 11   ALT 8 5     Magnesium: No results for input(s): "MG" in the last 48 hours.    Significant Imaging: I have reviewed all pertinent imaging results/findings within the past 24 hours.  "

## 2025-04-08 NOTE — PLAN OF CARE
04/08/25 1318   Discharge Assessment   Assessment Type Discharge Planning Assessment   Confirmed/corrected address, phone number and insurance Yes   Confirmed Demographics Correct on Facesheet   Source of Information family;patient   When was your last doctors appointment?   (no pcp)   Communicated CLIF with patient/caregiver Date not available/Unable to determine   Reason For Admission Arterial embolus of lower extremity   People in Home child(dolores), adult;grandchild(dolores)   Do you expect to return to your current living situation? Yes   Do you have help at home or someone to help you manage your care at home? Yes   Who are your caregiver(s) and their phone number(s)? Norma Whiting (Daughter)  [phone]   Prior to hospitilization cognitive status: Alert/Oriented   Current cognitive status: Alert/Oriented   Walking or Climbing Stairs Difficulty yes   Walking or Climbing Stairs ambulation difficulty, requires equipment   Mobility Management wc   Dressing/Bathing Difficulty yes   Dressing/Bathing bathing difficulty, requires equipment;bathing difficulty, assistance 1 person;dressing difficulty, assistance 1 person   Dressing/Bathing Management sc/daughter   Equipment Currently Used at Home power chair;wheelchair;bedside commode;hospital bed;shower chair   Readmission within 30 days? No   Patient currently being followed by outpatient case management? No   Do you currently have service(s) that help you manage your care at home? No   Do you take prescription medications? No   Do you have prescription coverage? Yes   Coverage mcr   Do you have any problems affording any of your prescribed medications? No   Is the patient taking medications as prescribed? no   If no, which medications is patient not taking? all   Who is going to help you get home at discharge? Norma Whiting (Daughter)  [phone]   How do you get to doctors appointments? family or friend will provide   Are you on dialysis? No   Do you take coumadin?  No   Discharge Plan A Skilled Nursing Facility   Discharge Plan B Rehab   DME Needed Upon Discharge  none   Discharge Plan discussed with: Patient;Spouse/sig other   Name(s) and Number(s) Norma Whiting (Daughter)  [phone]   Transition of Care Barriers Mobility   Physical Activity   On average, how many days per week do you engage in moderate to strenuous exercise (like a brisk walk)? 0 days   On average, how many minutes do you engage in exercise at this level? 0 min   Financial Resource Strain   How hard is it for you to pay for the very basics like food, housing, medical care, and heating? Not very   Housing Stability   In the last 12 months, was there a time when you were not able to pay the mortgage or rent on time? N   At any time in the past 12 months, were you homeless or living in a shelter (including now)? N   Transportation Needs   In the past 12 months, has lack of transportation kept you from medical appointments or from getting medications? no   In the past 12 months, has lack of transportation kept you from meetings, work, or from getting things needed for daily living? No   Food Insecurity   Within the past 12 months, you worried that your food would run out before you got the money to buy more. Never true   Within the past 12 months, the food you bought just didn't last and you didn't have money to get more. Never true   Stress   Do you feel stress - tense, restless, nervous, or anxious, or unable to sleep at night because your mind is troubled all the time - these days? Not at all   Social Isolation   How often do you feel lonely or isolated from those around you?  Never   Alcohol Use   Q1: How often do you have a drink containing alcohol? Never   [employer]   In the past 12 months has the electric, gas, oil, or water company threatened to shut off services in your home? No   Health Literacy   How often do you need to have someone help you when you read instructions, pamphlets, or other written  material from your doctor or pharmacy? Often   OTHER   Name(s) of People in Home Norma Whiting (Daughter)  [phone]

## 2025-04-08 NOTE — CONSULTS
[facility]                       [address] [place] [address]    PATIENT NAME:       GISSELLE WHITING  YOB: 1955  CSN:                [id number]   MRN:                [id number]  ADMIT DATE:         04/06/2025 21:30:00  PHYSICIAN:          Pete Morrow DPM                            CONSULTATION    DATE OF CONSULT:  04/07/2025 00:00:00    HISTORY OF PRESENT ILLNESS:  Mr. Whiting is a 69-year-old gentleman, who   presented to the hospital with lower extremity pain, consistent with severe   ischemia.  Apparently, he has multiple medical problems.  He has seen [facility] in the   past, but never followed up.  He has had CTAs, which revealed thrombus in the   distal thoracic aorta along with bilateral iliac occlusions.  Plans are for   angiogram later today.  I have been asked to see him concerning both feet.  He   has not taken care of these in at least year and a half that he can recollect.    Basically non-ambulator.    PAST MEDICAL HISTORY:  Notable for osteoarthritis, coronary artery disease,   hypertension, hyperlipidemia, peripheral artery disease, reflux, medical   noncompliance.    PAST SURGICAL HISTORY:  He has had previous hip surgery, angiograms, neck   surgery.    MEDICATIONS:  As per the chart.    ALLERGIES:  NO KNOWN FOOD ALLERGIES.     SOCIAL HISTORY:  Chronic tobacco user.  No alcohol or IV drug abuse.    FAMILY HISTORY:  Coronary artery disease.    PHYSICAL EXAMINATION:  GENERAL:  Reveals an elderly gentleman somewhat thin, awake, alert, although   hard of hearing.    VITAL SIGNS:  His current vital signs are stable and he is afebrile.  HEART:  Deferred.  LUNGS:  Deferred.  ABDOMEN:  Deferred.  EXTREMITIES:  Vascular:  He has nonpalpable pedal or popliteal pulses.  The feet   are cool to touch, rubrous changes.  Pallor on elevation.  Dependency was   deferred.  NEUROLOGIC:  Exam reveals notable  hyperesthesias to both feet.  MUSCULOSKELETAL:  Mild contractures of the digits.  Mild atrophy of the   musculature.  Stance and gait exams deferred.  No severe contractures at the   ankle.  DERM:  Reveals substantial trophic skin changes, xerosis bilateral.  Detritus in   the web spaces.  The nails are severely gryphotic, rotated around the distal   aspects of the digits.  There is no active open wounds to any area or fissures   on the feet.  Legs are stable.    ASSESSMENT:    1. Severe gryphotic nails and hygiene issues.   2. Limb-threatening ischemia, bilateral lower extremities.    PLAN:  Angiogram today.  We will continue to follow after angio.        ______________________________  KELLEN Melendrez/BREANN  DD:  04/07/2025  Time:  04:41PM  DT:  04/07/2025  Time:  08:43PM  Job #:  [id number]      CONSULTATION

## 2025-04-08 NOTE — ANESTHESIA POSTPROCEDURE EVALUATION
Anesthesia Post Evaluation    Patient: Prasanna Whiting    Procedure(s) Performed: Procedure(s) (LRB):  EMBOLECTOMY, LOWER EXTREMITY (Bilateral)    Final Anesthesia Type: general      Patient location during evaluation: PACU  Patient participation: Yes- Able to Participate  Post-procedure mental status: @ basline.  Pain management: adequate  AQI66 VEE Mitigation: See pacu RN note for any measures applied.  PONV status: See postop meds for drugs used to control n/v if any.  Anesthetic complications: no      Cardiovascular status: stable  Respiratory status: @ baseline.  Hydration status: euvolemic                Vitals Value Taken Time   /101 04/08/25 02:12   Temp 36.9 °C (98.4 °F) 04/08/25 02:12   Pulse 102 04/08/25 02:28   Resp 21 04/08/25 02:13   SpO2 93 % 04/08/25 02:28   Vitals shown include unfiled device data.      Event Time   Out of Recovery 04/08/2025 02:12:00         Pain/Daphney Score: Pain Rating Prior to Med Admin: 6 (4/8/2025  2:11 AM)  Pain Rating Post Med Admin: 2 (4/8/2025  2:31 AM)  Daphney Score: 10 (4/8/2025  2:12 AM)

## 2025-04-08 NOTE — NURSING
Subjective:      Patient ID: Prasanna Whiting is a 69 y.o. male.    Chief Complaint: Leg Pain (Tx from [facility] for Interventional cardiology , L leg pain, US at prior hospital showed no flow to Lower extremity , on heparin infusion /Hx: DVT)    HPI  Review of Systems   Objective:     Physical Exam   Assessment:     1. Arterial embolus of lower extremity    2. Thrombosis of thoracic aorta    3. Occlusion of superior mesenteric artery    4. Chest pain    5. NSTEMI (non-ST elevated myocardial infarction)    6. Carotid artery stenosis    7. 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           Wound 04/07/25 2334 Incision Left Groin (Active)   04/07/25 2334 Groin   Present on Original Admission: N   Primary Wound Type: Incision   Side: Left   Orientation:    Wound Approximate Age at First Assessment (Weeks):    Wound Number:    Is this injury device related?:    Incision Type:    Closure Method: Sutures;Surgical glue   Wound Description (Comments):    Type:    Additional Comments: PRINEO   Ankle-Brachial Index:    Pulses:    Removal Indication and Assessment:    Wound Outcome:    Dressing Appearance Open to air 04/08/25 0230   Drainage Amount None 04/08/25 0230   Appearance Sutures intact;Dermabond 04/08/25 0230            Wound 04/08/25 0007 Incision Left medial Leg (Active)   04/08/25 0007 Leg   Present on Original Admission: N   Primary Wound Type: Incision   Side: Left   Orientation: medial   Wound Approximate Age at First Assessment (Weeks):    Wound Number:    Is this injury device related?:    Incision Type:    Closure Method: Sutures;Surgical glue   Wound Description (Comments):    Type:    Additional Comments: PRINEO   Ankle-Brachial Index:    Pulses:    Removal Indication and Assessment:    Wound Outcome:    Dressing Appearance Open to air 04/08/25 0230   Drainage Amount None 04/08/25 0230   Appearance Sutures intact;Dermabond 04/08/25 0230            Wound 04/08/25 0027 Incision Right Groin  (Active)   04/08/25 0027 Groin   Present on Original Admission: N   Primary Wound Type: Incision   Side: Right   Orientation:    Wound Approximate Age at First Assessment (Weeks):    Wound Number:    Is this injury device related?:    Incision Type:    Closure Method: Sutures;Surgical glue   Wound Description (Comments):    Type:    Additional Comments: DANITZA   Ankle-Brachial Index:    Pulses:    Removal Indication and Assessment:    Wound Outcome:    Dressing Appearance Open to air 04/08/25 0230   Drainage Amount None 04/08/25 0230   Appearance Sutures intact;Dermabond 04/08/25 0230       Plan:        68 y/o male in with severe limb ischemia.   Was seen by podiatry on yesterday-see dr. Bentley note.  No open wds or fissures.  Angiogram was done later in the day by dr. Lucero.  Noted.  Will follow Podiatry for now.

## 2025-04-08 NOTE — PROGRESS NOTES
Pt with severe BLE atherosclerotic disease along with acute/subacute thromboembolism.  There are no options for meaningful revascularization.  If his LE symptoms worsen, he would require amputation.

## 2025-04-08 NOTE — ASSESSMENT & PLAN NOTE
Hyponatremia is likely due to Dehydration/hypovolemia. The patient's most recent sodium results are listed below.  Recent Labs     04/06/25  1849 04/07/25  0006   * 136     Plan  - Correct the sodium by 4-6mEq in 24 hours.   - Obtain the following studies: none.  - Will treat the hyponatremia with stable  - Monitor sodium Daily.   - Patient hyponatremia is stable  - .

## 2025-04-08 NOTE — ANESTHESIA PROCEDURE NOTES
Intubation    Date/Time: 4/7/2025 11:09 PM    Performed by: Corinne Sutton CRNA  Authorized by: Pete Tatum MD    Intubation:     Induction:  Intravenous    Intubated:  Postinduction    Mask Ventilation:  Easy mask    Attempts:  1    Attempted By:  CRNA    Method of Intubation:  Video laryngoscopy    Blade:  Coleman 3    Laryngeal View Grade: Grade I - full view of cords      Difficult Airway Encountered?: No      Complications:  None    Airway Device:  Oral endotracheal tube    Airway Device Size:  7.5    Style/Cuff Inflation:  Cuffed    Inflation Amount (mL):  6    Tube secured:  21    Secured at:  The lips    Placement Verified By:  Colorimetric ETCO2 device    Complicating Factors:  None    Findings Post-Intubation:  BS equal bilateral

## 2025-04-08 NOTE — PROGRESS NOTES
[facility] - 9th Floor Medical Telemetry  Cardiology  Progress Note    Patient Name: Prasanna Whiting  MRN: [id number]  Admission Date: 4/6/2025  Hospital Length of Stay: 2 days  Code Status: Full Code   Attending Physician: Reyes, Thairy G, DO   Primary Care Physician: No, Primary Doctor  Expected Discharge Date:   Principal Problem:Arterial embolus of lower extremity    Subjective:     Brief HPI:   This is a 69-year-old male, who has distantly known to Dr. Reid who was last seen in 2016, history of CAD, HTN, HLD, PVD, GERD, tobacco use, medical noncompliance.  He said he has not seen a physician in over 4 years and has not taken any medicines since then.  He presented to [facility] ER with complaints of left lower extremity burning started the day prior to arrival.  Patient was found to have occlusion of the entire left leg.  CTA revealed thrombus in the distal thoracic aorta as well as occlusions bilateral iliac arteries.  BP was markedly elevated.  Initial troponin was 0.526.  Overnight he had wide complex tachycardia noted on monitor.  EKG reveals sinus rhythm with PVCs and bifascicular block.  He denied chest pain.  CIS has been consulted for a while, this tachycardia, NSTEMI, and vasculopathy.  Of note vascular surgery has been consulted for left lower leg arterial occlusion.     Hospital Course:   4.8.25: NAD noted. Underwent attempted embolectomy with vascular surgery. Was found to have significant atherosclerotic disease and there were no options for meaningful vascularization. No further WCT on tele. Troponin trended down from admit.      PMH: CAD, HTN, HLD, PVD, GERD, tobacco use, medical noncompliance  PSH: neck surgery, hip surgery, peripheral angiogram   Social History: current tobacco use, Denies EtOH and illicit drug use  Family History: Father-CAD, Mother-CAD/CABG     Previous Cardiac Diagnostics:   Echo 4.7.25  Left Ventricle: The left ventricle is normal in size. There is concentric  remodeling. Moderate global hypokinesis present. Septal motion is abnormal is consistent with bundle branch block. There is moderately reduced systolic function with a visually estimated ejection fraction of 30 - 40%. Grade I diastolic dysfunction.  Right Ventricle: The right ventricle is normal in size. Systolic function is normal.  Left Atrium: Mildly dilated  Aortic Valve: Aortic valve peak velocity is 1.1 m/s. Mean gradient is 3 mmHg.    CTA with runoff 4.6.25  There is no significant interval change in the 2.4 cm low attenuation nodule at the medial limb of the right adrenal gland on image 21 series 4. Correlate with clinical and laboratory findings as regards further evaluation and follow-up.   There is interval progession in the focal areas of decreased enhancement with associated mild perinephric fat stranding in the lower pole of the left kidney. This may represent a non expansile neoplastic process, pyelonephritis is not entirely excluded. Correlate with clinical and laboratory findings as regards further evaluation and follow-up.   There is peripherally located thrombus in the distal thoracic aorta through to the suprailiac aorta with associated mild to moderate luminal narrowing. There is total occlusion of the superior mesenteric artery on image 146 series 16, inferior mesenteric artery on image 257 as well as right common iliac artery. There is no enhancement noted in the right internal and external iliac arteries.   There is total occlusion of the left internal iliac artery at its origin without enhancement in the distal segment.   There is total occlusion of the left common femoral artery on image 203 series 15 without enhancement in the left superficial femoral, popliteal and trifurcation vessels. The delayed phase images show no flow in the left popliteal artery with possibly some minimal patchy reconstituted flow in scattered short segments of the trifurcation vessels with posterior tibial artery  flow at the ankle. Correlate clinically as regards additional evaluation and follow-up possibly with digital subtraction angiography if clinically indicated.   The early phase imaging does not demonstrate flow in the right lower extremity arteries. The delayed phase images (series 37) demonstrates some reconstituted flow in the right distal superficial femoral artery and popliteal artery with pronounced calcification of the trifurcation vessels but with intermittent identification of contrast in the proximal trifurcation vessels with some contrast in the peroneal artery at the ankle with some reconstituted anterior and posterior tibial flow at the ankle as well. Consider follow-up with digital subtraction angiography if clinically indicated.   Details and other findings as discussed above.      LLE arterial US 4.6.25  No flow is demonstrated in the arteries of the left lower extremity         Review of Systems   Constitutional: Negative for chills and fever.   Cardiovascular:  Negative for chest pain and palpitations.   Respiratory:  Negative for shortness of breath.    Psychiatric/Behavioral:  Negative for altered mental status.            Objective:     Vital Signs (Most Recent):  Temp: 97.9 °F (36.6 °C) (04/08/25 1059)  Pulse: 84 (04/08/25 1059)  Resp: 18 (04/08/25 1059)  BP: (!) 152/93 (04/08/25 1059)  SpO2: 96 % (04/08/25 1059) Vital Signs (24h Range):  Temp:  [97.2 °F (36.2 °C)-98.7 °F (37.1 °C)] 97.9 °F (36.6 °C)  Pulse:  [] 84  Resp:  [14-21] 18  SpO2:  [93 %-100 %] 96 %  BP: (152-206)/() 152/93     Weight: 70.1 kg (154 lb 8.7 oz)  Body mass index is 20.39 kg/m².    SpO2: 96 %         Intake/Output Summary (Last 24 hours) at 4/8/2025 1141  Last data filed at 4/8/2025 0435  Gross per 24 hour   Intake 1000 ml   Output 700 ml   Net 300 ml       Lines/Drains/Airways       Peripheral Intravenous Line  Duration                  Peripheral IV - Single Lumen 04/06/25 18 G Left;Posterior Forearm 2 days     "     Peripheral IV - Single Lumen 04/06/25 20 G Anterior;Right Forearm 2 days                    Significant Labs: CMP   Recent Labs   Lab 04/06/25  1849 04/07/25  0006   * 136   K 3.5 3.5    105   CO2 21* 20*   BUN 21.0 16.9   CREATININE 1.82* 1.31*   CALCIUM 8.7* 8.2*   ALBUMIN 3.0* 2.7*   BILITOT 0.4 0.4   ALKPHOS 48 40   AST 12 11   ALT 8 5   , CBC   Recent Labs   Lab 04/06/25  1849 04/06/25  2158   WBC 12.60* 13.74*   HGB 14.5 14.2   HCT 43.0 41.8*    148   , and Troponin No results for input(s): "TROPONINIHS" in the last 48 hours.    Telemetry:  SR    Physical Exam:  Physical Exam  Constitutional:       Appearance: Normal appearance.   HENT:      Head: Normocephalic.   Eyes:      Extraocular Movements: Extraocular movements intact.      Conjunctiva/sclera: Conjunctivae normal.   Cardiovascular:      Rate and Rhythm: Normal rate and regular rhythm.      Pulses: Normal pulses.      Heart sounds: Normal heart sounds.   Pulmonary:      Effort: Pulmonary effort is normal.      Breath sounds: Normal breath sounds.   Abdominal:      Palpations: Abdomen is soft.   Musculoskeletal:         General: Normal range of motion.      Cervical back: Neck supple.   Skin:     General: Skin is dry.      Comments: BLE cold to touch from mid thigh down to feet.    Neurological:      Mental Status: He is alert and oriented to person, place, and time. Mental status is at baseline.   Psychiatric:         Mood and Affect: Mood normal.         Behavior: Behavior normal.         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  Plan for outpatient ischemic work-up (LexiPET stress test)      Yogi Schmitz [facility]  Cardiology  [facility] - 9th Floor Medical Telemetry  04/08/2025         [1]   Current Facility-Administered Medications:     acetaminophen tablet 650 mg, 650 mg, Oral, Q4H PRN, Reyes, Thairy G, DO    albuterol-ipratropium 2.5 mg-0.5 mg/3 mL nebulizer solution 3 mL, 3 mL, Nebulization, Q6H PRN, Reyes, Thairy G, DO    aluminum-magnesium hydroxide-simethicone 200-200-20 mg/5 mL suspension 30 mL, 30 mL, Oral, QID PRN, Reyes, Thairy G, DO    ammonium lactate 12 % lotion, , Topical (Top), BID PRN, Reyes, Thairy G, DO    atorvastatin tablet 80 mg, 80 mg, Oral, Daily, Reyes, Thairy G, DO, 80 mg at 04/08/25 0841    bisacodyL suppository 10 mg, 10 mg, Rectal, Daily PRN, Reyes, Thairy G, DO    dextrose 50% injection 12.5 g, 12.5 g, Intravenous, PRN, Reyes, Thairy G, DO    dextrose 50% injection 12.5 g, 12.5 g, Intravenous, PRN, Pete Tatum MD    dextrose 50% injection 25 g, 25 g, Intravenous, PRN, Reyes, Thairy G, DO    glucagon (human recombinant) injection 1 mg, 1 mg, Intramuscular, PRN, Reyes, Thairy G, DO    glucose chewable tablet 16 g, 16 g, Oral, PRN, Reyes, Thairy G, DO    glucose chewable tablet 24 g, 24 g, Oral, PRN, Reyes, Thairy G, DO    heparin 25,000 units in dextrose 5% (100 units/ml) IV bolus from bag HIGH INTENSITY nomogram - [facility], 80 Units/kg, Intravenous, Once, Reyes, Thairy G, DO    heparin 25,000 units in dextrose 5% (100 units/ml) IV bolus from bag HIGH INTENSITY nomogram - [facility], 60 Units/kg, Intravenous, PRN, Reyes, Thairy G, DO    heparin 25,000 units in dextrose 5% (100 units/ml) IV bolus from bag HIGH INTENSITY nomogram - [facility], 30 Units/kg, Intravenous, PRN, Reyes, Thairy G, DO    heparin 25,000 units in dextrose 5% 250 mL (100 units/mL) infusion HIGH  INTENSITY nomogram - [facility], 0-40 Units/kg/hr, Intravenous, Continuous, Reyes, Thairy G, DO, Last Rate: 13.1 mL/hr at 04/07/25 1616, 18 Units/kg/hr at 04/07/25 1616    hydrALAZINE injection 10 mg, 10 mg, Intravenous, Q4H PRN, Yifan Cottrell MD, 10 mg at 04/07/25 0652    HYDROcodone-acetaminophen 5-325 mg per tablet 1 tablet, 1 tablet, Oral, Q6H PRN, Reyes, Thairy G, DO, 1 tablet at 04/08/25 0844    HYDROmorphone (PF) injection 1 mg, 1 mg, Intravenous, Q6H PRN, Yifan Cottrell MD, 1 mg at 04/08/25 0944    labetaloL injection 10 mg, 10 mg, Intravenous, QID PRN, Reyes, Thairy G, DO, 10 mg at 04/08/25 0138    melatonin tablet 9 mg, 9 mg, Oral, Nightly PRN, Reyes, Thairy G, DO    naloxone 0.4 mg/mL injection 0.02 mg, 0.02 mg, Intravenous, PRN, Reyes, Thairy G, DO    nicotine 14 mg/24 hr 1 patch, 1 patch, Transdermal, Daily PRN, Reyes, Thairy G, DO    NIFEdipine 24 hr tablet 30 mg, 30 mg, Oral, Daily, Reyes, Thairy G, DO, 30 mg at 04/08/25 0841    ondansetron disintegrating tablet 8 mg, 8 mg, Oral, Q8H PRN, Reyes, Thairy G, DO    ondansetron injection 4 mg, 4 mg, Intravenous, Q8H PRN, Reyes, Thairy G, DO, 4 mg at 04/08/25 0149    polyethylene glycol packet 17 g, 17 g, Oral, TID PRN, Reyes, Thairy G, DO    simethicone chewable tablet 80 mg, 1 tablet, Oral, QID PRN, Reyes, Thairy G, DO    sodium chloride 0.9% flush 10 mL, 10 mL, Intravenous, PRN, Reyes, Thairy G, DO

## 2025-04-08 NOTE — PROGRESS NOTES
[facility] - 9th Floor [facility] Medicine  Progress Note    Patient Name: Prasanna Whiting  MRN: [id number]  Patient Class: IP- Inpatient   Admission Date: 4/6/2025  Length of Stay: 2 days  Attending Physician: Reyes, Thairy G, DO  Primary Care Provider: Mary, Primary Doctor        Subjective     Principal Problem:Arterial embolus of lower extremity        HPI:  69-year-old male with no known medical condition as he states he has not seen a physician in 4 years. Prior to that he says he took a proximally 15 medications on a daily basis but has not taken anything for the last 4 years. Patient presents today with complaint of left lower extremity burning that started yesterday.        Overview/Hospital Course:  4/8/25-Patient seems to be ion better spirits today.  Waiting for further recs from vascular.  Likely amputation from previous notes.    Interval History:     Review of Systems   Constitutional:  Positive for fatigue.   HENT: Negative.     Eyes: Negative.    Respiratory: Negative.     Cardiovascular: Negative.    Gastrointestinal: Negative.    Endocrine: Negative.    Genitourinary: Negative.    Musculoskeletal:  Positive for arthralgias, gait problem and myalgias.   Skin:  Positive for wound.   Allergic/Immunologic: Negative.    Hematological: Negative.    Psychiatric/Behavioral: Negative.       Objective:     Vital Signs (Most Recent):  Temp: 97.6 °F (36.4 °C) (04/08/25 1509)  Pulse: 85 (04/08/25 1509)  Resp: 19 (04/08/25 1509)  BP: 120/80 (04/08/25 1509)  SpO2: 96 % (04/08/25 1059) Vital Signs (24h Range):  Temp:  [97.2 °F (36.2 °C)-98.7 °F (37.1 °C)] 97.6 °F (36.4 °C)  Pulse:  [] 85  Resp:  [14-24] 19  SpO2:  [93 %-100 %] 96 %  BP: (120-206)/() 120/80     Weight: 70.1 kg (154 lb 8.7 oz)  Body mass index is 20.39 kg/m².    Intake/Output Summary (Last 24 hours) at 4/8/2025 1622  Last data filed at 4/8/2025 0435  Gross per 24 hour   Intake 1000 ml   Output 700 ml   Net  "300 ml         Physical Exam  Constitutional:       Appearance: He is underweight. He is ill-appearing.   HENT:      Head: Normocephalic and atraumatic.      Nose: Nose normal.      Mouth/Throat:      Mouth: Mucous membranes are moist.      Pharynx: Oropharynx is clear.   Eyes:      Extraocular Movements: Extraocular movements intact.      Conjunctiva/sclera: Conjunctivae normal.      Pupils: Pupils are equal, round, and reactive to light.   Cardiovascular:      Rate and Rhythm: Normal rate and regular rhythm.      Heart sounds: Murmur heard.   Pulmonary:      Effort: Pulmonary effort is normal.      Breath sounds: Normal breath sounds.   Abdominal:      General: Bowel sounds are normal.      Palpations: Abdomen is soft.   Musculoskeletal:         General: Tenderness present. Normal range of motion.      Cervical back: Normal range of motion and neck supple.   Skin:     General: Skin is warm and dry.      Capillary Refill: Capillary refill takes 2 to 3 seconds.      Findings: Lesion present.   Neurological:      General: No focal deficit present.      Mental Status: He is alert and oriented to person, place, and time. Mental status is at baseline.   Psychiatric:         Mood and Affect: Mood normal.         Thought Content: Thought content normal.         Judgment: Judgment normal.               Significant Labs: All pertinent labs within the past 24 hours have been reviewed.  BMP:   Recent Labs   Lab 04/07/25  0006      K 3.5      CO2 20*   BUN 16.9   CREATININE 1.31*   CALCIUM 8.2*     CBC:   Recent Labs   Lab 04/06/25 1849 04/06/25  2158   WBC 12.60* 13.74*   HGB 14.5 14.2   HCT 43.0 41.8*    148     CMP:   Recent Labs   Lab 04/06/25 1849 04/07/25  0006   * 136   K 3.5 3.5    105   CO2 21* 20*   BUN 21.0 16.9   CREATININE 1.82* 1.31*   CALCIUM 8.7* 8.2*   ALBUMIN 3.0* 2.7*   BILITOT 0.4 0.4   ALKPHOS 48 40   AST 12 11   ALT 8 5     Magnesium: No results for input(s): "MG" in the " last 48 hours.    Significant Imaging: I have reviewed all pertinent imaging results/findings within the past 24 hours.      Assessment & Plan  Arterial embolus of lower extremity      Tobacco dependency  Dangers of cigarette smoking were reviewed with patient in detail. Patient was Counseled for 3-10 minutes. Nicotine replacement options were discussed. Nicotine replacement was discussed- not prescribed per patient's request  Hyponatremia  Hyponatremia is likely due to Dehydration/hypovolemia. The patient's most recent sodium results are listed below.  Recent Labs     04/06/25 1849 04/07/25  0006   * 136     Plan  - Correct the sodium by 4-6mEq in 24 hours.   - Obtain the following studies: none.  - Will treat the hyponatremia with stable  - Monitor sodium Daily.   - Patient hyponatremia is stable  - .  Thrombocytopenia  The likely etiology of thrombocytopenia is liver disease. The patients 3 most recent labs are listed below.  Recent Labs     04/06/25 1849 04/06/25 2158    148     Plan  - Will transfuse if platelet count is <20k.  - .    VTE Risk Mitigation (From admission, onward)           Ordered     IP VTE LOW RISK PATIENT  Once         04/07/25 0415     Place sequential compression device  Until discontinued         04/07/25 0415     heparin 25,000 units in dextrose 5% (100 units/ml) IV bolus from bag HIGH INTENSITY nomogram - [facility]  As needed (PRN)        Question:  Heparin Infusion Adjustment (DO NOT MODIFY ANSWER)  Answer:  \\[facility] [email or web address]\epic\Images\Pharmacy\HeparinInfusions\heparin HIGH INTENSITY nomogram for [facility] [id number].pdf    04/06/25 2154     heparin 25,000 units in dextrose 5% (100 units/ml) IV bolus from bag HIGH INTENSITY nomogram - [facility]  As needed (PRN)        Question:  Heparin Infusion Adjustment (DO NOT MODIFY ANSWER)  Answer:  \[email or web address]\epic\Images\Pharmacy\HeparinInfusions\heparin HIGH INTENSITY nomogram for [facility] [id number].pdf    04/06/25 2154     heparin 25,000 units in dextrose  5% (100 units/ml) IV bolus from bag HIGH INTENSITY nomogram - [facility]  Once        Question:  Heparin Infusion Adjustment (DO NOT MODIFY ANSWER)  Answer:  \\[email or web address]\epic\Images\Pharmacy\HeparinInfusions\heparin HIGH INTENSITY nomogram for [facility] [id number].pdf    04/06/25 2154     heparin 25,000 units in dextrose 5% 250 mL (100 units/mL) infusion HIGH INTENSITY nomogram - [facility]  Continuous        Question:  Begin at (units/kg/hr)  Answer:  18 04/06/25 2154                    Discharge Planning   CLIF:      Code Status: Full Code   Medical Readiness for Discharge Date:   Discharge Plan A: Skilled Nursing Facility      Follow labs  CV following  Heparin drip  Resume current meds  Likely needs amputation                  Deng Sheehan MD  Department of Hospital Medicine   [facility] - 9th Floor Medical Telemetry

## 2025-04-08 NOTE — ASSESSMENT & PLAN NOTE
The likely etiology of thrombocytopenia is liver disease. The patients 3 most recent labs are listed below.  Recent Labs     04/06/25  1849 04/06/25  2158    148     Plan  - Will transfuse if platelet count is <20k.  - .

## 2025-04-08 NOTE — TRANSFER OF CARE
"Anesthesia Transfer of Care Note    Patient: Prasanna Whiting    Procedure(s) Performed: Procedure(s) (LRB):  EMBOLECTOMY, LOWER EXTREMITY (Bilateral)    Patient location: PACU    Anesthesia Type: general    Transport from OR: Transported from OR on 6-10 L/min O2 by face mask with adequate spontaneous ventilation    Post pain: adequate analgesia    Post assessment: no apparent anesthetic complications and tolerated procedure well    Post vital signs: stable    Level of consciousness: awake, alert and oriented    Nausea/Vomiting: no nausea/vomiting    Complications: none    Transfer of care protocol was followed    Last vitals: Visit Vitals  BP (!) 174/107   Pulse 90   Temp 36.6 °C (97.8 °F) (Oral)   Resp 18   Ht 6' 1" (1.854 m)   Wt 70.1 kg (154 lb 8.7 oz)   SpO2 96%   BMI 20.39 kg/m²     "

## 2025-04-09 NOTE — PROGRESS NOTES
[facility]                       [address] [id number]    PATIENT NAME:       GISSELLE NAVARRETE  YOB: 1955  CSN:                [id number]   MRN:                [id number]  ADMIT DATE:         04/06/2025 21:30:00  PHYSICIAN:          Pete Morrow DPM                            PROGRESS NOTE    DATE:  04/09/2025 00:00:00    SUBJECTIVE:  The patient is seen today.  Apparently, he had some issues with   pain control last night.  They have escalated his meds today.  There were no   options for intervention from a revascularization standpoint.    PHYSICAL EXAMINATION:  VITAL SIGNS:  Stable and afebrile.  EXTREMITIES:  Again, the feet are cool to touch.  No palpable pedal or popliteal   pulses.  No open wounds.  Dystrophic and xerotic skin changes.  Gryphotic   nails.    LABORATORY DATA:  Reviewed.  White cell counts are 12, H and H 13 and 41.  BUN   and creatinine 20 and 1.19.    ASSESSMENT:  Lower extremity ischemia with no options for revascularization.    PLAN:  Again, pain will be the deciding factor as to how long he is currently   going to continue to keep these plans.  This has been discussed with him by   other specialists.  At this point, continue with current care.        ______________________________  Pete Morrow DPM    GAS/AQS  DD:  04/09/2025  Time:  03:21PM  DT:  04/09/2025  Time:  04:06PM  Job #:  [id number]      PROGRESS NOTE

## 2025-04-09 NOTE — PROGRESS NOTES
[facility] - 9th Floor [facility] Medicine  Progress Note    Patient Name: Prasanna Whiting  MRN: [id number]  Patient Class: IP- Inpatient   Admission Date: 4/6/2025  Length of Stay: 3 days  Attending Physician: Deng Sheehan MD  Primary Care Provider: No, Primary Doctor        Subjective     Principal Problem:Arterial embolus of lower extremity        HPI:  69-year-old male with no known medical condition as he states he has not seen a physician in 4 years. Prior to that he says he took a proximally 15 medications on a daily basis but has not taken anything for the last 4 years. Patient presents today with complaint of left lower extremity burning that started yesterday.        Overview/Hospital Course:  4/8/25-Patient seems to be ion better spirits today.  Waiting for further recs from vascular.  Likely amputation from previous notes.    4/9/25-Patient has been ion more pain since yesterday.  Apparently he asked nurse last night to call doctors for adjustment but he states the nurse refused.  Will adjust meds today for better relief.  Tri=sure if he needs amputation but it is highly likely that he will especially if his pain continues to worsen.    Interval History:     Review of Systems   Constitutional:  Positive for activity change.   HENT: Negative.     Eyes: Negative.    Respiratory: Negative.     Cardiovascular: Negative.    Gastrointestinal: Negative.    Endocrine: Negative.    Genitourinary: Negative.    Musculoskeletal:  Positive for gait problem and myalgias.   Skin:  Positive for color change.   Allergic/Immunologic: Negative.    Neurological:  Positive for weakness.   Hematological: Negative.    Psychiatric/Behavioral:  The patient is nervous/anxious.      Objective:     Vital Signs (Most Recent):  Temp: 97.5 °F (36.4 °C) (04/09/25 0734)  Pulse: 84 (04/09/25 0734)  Resp: 18 (04/09/25 0938)  BP: (!) 144/79 (04/09/25 0734)  SpO2: 96 % (04/09/25 0734) Vital Signs (24h  Range):  Temp:  [97.5 °F (36.4 °C)-98.7 °F (37.1 °C)] 97.5 °F (36.4 °C)  Pulse:  [] 84  Resp:  [18-24] 18  SpO2:  [96 %-97 %] 96 %  BP: (120-220)/(79-96) 144/79     Weight: 70.1 kg (154 lb 8.7 oz)  Body mass index is 20.39 kg/m².  No intake or output data in the 24 hours ending 04/09/25 1006      Physical Exam  Constitutional:       Appearance: Normal appearance.   HENT:      Head: Normocephalic and atraumatic.      Nose: Nose normal.      Mouth/Throat:      Mouth: Mucous membranes are moist.      Pharynx: Oropharynx is clear.   Eyes:      Extraocular Movements: Extraocular movements intact.      Conjunctiva/sclera: Conjunctivae normal.      Pupils: Pupils are equal, round, and reactive to light.   Cardiovascular:      Rate and Rhythm: Normal rate and regular rhythm.      Pulses: Normal pulses.      Heart sounds: Normal heart sounds.   Pulmonary:      Effort: Pulmonary effort is normal.      Breath sounds: Normal breath sounds.   Abdominal:      General: Bowel sounds are normal.      Palpations: Abdomen is soft.   Musculoskeletal:         General: Tenderness present. Normal range of motion.      Cervical back: Normal range of motion and neck supple.   Skin:     General: Skin is cool and dry.      Capillary Refill: Capillary refill takes 2 to 3 seconds.      Coloration: Skin is cyanotic and mottled.   Neurological:      General: No focal deficit present.      Mental Status: He is alert and oriented to person, place, and time. Mental status is at baseline.   Psychiatric:         Mood and Affect: Mood normal.         Thought Content: Thought content normal.         Judgment: Judgment normal.               Significant Labs: All pertinent labs within the past 24 hours have been reviewed.  BMP:   Recent Labs   Lab 04/09/25  0523   *   K 4.3   CL 96*   CO2 18*   BUN 20.1   CREATININE 1.19   CALCIUM 9.2   MG 2.20     CBC:   Recent Labs   Lab 04/09/25  0523   WBC 12.89*   HGB 13.9*   HCT 41.9*        CMP:    Recent Labs   Lab 04/09/25  0523   *   K 4.3   CL 96*   CO2 18*   BUN 20.1   CREATININE 1.19   CALCIUM 9.2   ALBUMIN 4.0   BILITOT 0.8   ALKPHOS 51   AST 29   ALT 6     Magnesium:   Recent Labs   Lab 04/09/25  0523   MG 2.20       Significant Imaging: I have reviewed all pertinent imaging results/findings within the past 24 hours.      Assessment & Plan  Arterial embolus of lower extremity      Tobacco dependency  Dangers of cigarette smoking were reviewed with patient in detail. Patient was Counseled for 3-10 minutes. Nicotine replacement options were discussed. Nicotine replacement was discussed- not prescribed per patient's request  Hyponatremia  Hyponatremia is likely due to Dehydration/hypovolemia. The patient's most recent sodium results are listed below.  Recent Labs     04/06/25  1849 04/07/25  0006 04/09/25  0523   * 136 132*     Plan  - Correct the sodium by 4-6mEq in 24 hours.   - Obtain the following studies: none.  - Will treat the hyponatremia with stable  - Monitor sodium Daily.   - Patient hyponatremia is stable  - .  Thrombocytopenia  The likely etiology of thrombocytopenia is liver disease. The patients 3 most recent labs are listed below.  Recent Labs     04/06/25  1849 04/06/25 2158 04/09/25  0523    148 221     Plan  - Will transfuse if platelet count is <20k.  - .    VTE Risk Mitigation (From admission, onward)           Ordered     IP VTE LOW RISK PATIENT  Once         04/07/25 0415     Place sequential compression device  Until discontinued         04/07/25 0415     heparin 25,000 units in dextrose 5% (100 units/ml) IV bolus from bag HIGH INTENSITY nomogram - [facility]  As needed (PRN)        Question:  Heparin Infusion Adjustment (DO NOT MODIFY ANSWER)  Answer:  [email or web address]\epic\Images\Pharmacy\HeparinInfusions\heparin HIGH INTENSITY nomogram for [facility] [id number]    04/06/25 2154     heparin 25,000 units in dextrose 5% (100 units/ml) IV bolus from bag HIGH INTENSITY nomogram  - [facility]  As needed (PRN)        Question:  Heparin Infusion Adjustment (DO NOT MODIFY ANSWER)  Answer:  [email or web address]\epic\Images\Pharmacy\HeparinInfusions\heparin HIGH INTENSITY nomogram for [facility] [id number].pdf    04/06/25 2154     heparin 25,000 units in dextrose 5% (100 units/ml) IV bolus from bag HIGH INTENSITY nomogram - [facility]  Once        Question:  Heparin Infusion Adjustment (DO NOT MODIFY ANSWER)  Answer:  [email or web address]\epic\Images\Pharmacy\HeparinInfusions\heparin HIGH INTENSITY nomogram for [facility] [id number].pdf    04/06/25 2154     heparin 25,000 units in dextrose 5% 250 mL (100 units/mL) infusion HIGH INTENSITY nomogram - [facility]  Continuous        Question:  Begin at (units/kg/hr)  Answer:  18 04/06/25 2154                    Discharge Planning   CLIF:      Code Status: Full Code   Medical Readiness for Discharge Date:   Discharge Plan A: Skilled Nursing Facility        Follow labs  Increased pain control  Heparin drip  Resume current meds  Likely will need amputation    Critical care time=31mins                Deng Sheehan MD  Department of Hospital Medicine   [facility] - 9th Floor Medical Telemetry

## 2025-04-09 NOTE — SUBJECTIVE & OBJECTIVE
Interval History:     Review of Systems   Constitutional:  Positive for activity change.   HENT: Negative.     Eyes: Negative.    Respiratory: Negative.     Cardiovascular: Negative.    Gastrointestinal: Negative.    Endocrine: Negative.    Genitourinary: Negative.    Musculoskeletal:  Positive for gait problem and myalgias.   Skin:  Positive for color change.   Allergic/Immunologic: Negative.    Neurological:  Positive for weakness.   Hematological: Negative.    Psychiatric/Behavioral:  The patient is nervous/anxious.      Objective:     Vital Signs (Most Recent):  Temp: 97.5 °F (36.4 °C) (04/09/25 0734)  Pulse: 84 (04/09/25 0734)  Resp: 18 (04/09/25 0938)  BP: (!) 144/79 (04/09/25 0734)  SpO2: 96 % (04/09/25 0734) Vital Signs (24h Range):  Temp:  [97.5 °F (36.4 °C)-98.7 °F (37.1 °C)] 97.5 °F (36.4 °C)  Pulse:  [] 84  Resp:  [18-24] 18  SpO2:  [96 %-97 %] 96 %  BP: (120-220)/(79-96) 144/79     Weight: 70.1 kg (154 lb 8.7 oz)  Body mass index is 20.39 kg/m².  No intake or output data in the 24 hours ending 04/09/25 1006      Physical Exam  Constitutional:       Appearance: Normal appearance.   HENT:      Head: Normocephalic and atraumatic.      Nose: Nose normal.      Mouth/Throat:      Mouth: Mucous membranes are moist.      Pharynx: Oropharynx is clear.   Eyes:      Extraocular Movements: Extraocular movements intact.      Conjunctiva/sclera: Conjunctivae normal.      Pupils: Pupils are equal, round, and reactive to light.   Cardiovascular:      Rate and Rhythm: Normal rate and regular rhythm.      Pulses: Normal pulses.      Heart sounds: Normal heart sounds.   Pulmonary:      Effort: Pulmonary effort is normal.      Breath sounds: Normal breath sounds.   Abdominal:      General: Bowel sounds are normal.      Palpations: Abdomen is soft.   Musculoskeletal:         General: Tenderness present. Normal range of motion.      Cervical back: Normal range of motion and neck supple.   Skin:     General: Skin is  cool and dry.      Capillary Refill: Capillary refill takes 2 to 3 seconds.      Coloration: Skin is cyanotic and mottled.   Neurological:      General: No focal deficit present.      Mental Status: He is alert and oriented to person, place, and time. Mental status is at baseline.   Psychiatric:         Mood and Affect: Mood normal.         Thought Content: Thought content normal.         Judgment: Judgment normal.               Significant Labs: All pertinent labs within the past 24 hours have been reviewed.  BMP:   Recent Labs   Lab 04/09/25  0523   *   K 4.3   CL 96*   CO2 18*   BUN 20.1   CREATININE 1.19   CALCIUM 9.2   MG 2.20     CBC:   Recent Labs   Lab 04/09/25 0523   WBC 12.89*   HGB 13.9*   HCT 41.9*        CMP:   Recent Labs   Lab 04/09/25 0523   *   K 4.3   CL 96*   CO2 18*   BUN 20.1   CREATININE 1.19   CALCIUM 9.2   ALBUMIN 4.0   BILITOT 0.8   ALKPHOS 51   AST 29   ALT 6     Magnesium:   Recent Labs   Lab 04/09/25  0523   MG 2.20       Significant Imaging: I have reviewed all pertinent imaging results/findings within the past 24 hours.

## 2025-04-09 NOTE — PROGRESS NOTES
[facility] - 9th Floor Medical Telemetry  Cardiology  Progress Note    Patient Name: Prasanna Whiting  MRN: [id number]  Admission Date: 4/6/2025  Hospital Length of Stay: 3 days  Code Status: Full Code   Attending Physician: Deng Sheehan MD   Primary Care Physician: Mary, Primary Doctor  Expected Discharge Date:   Principal Problem:Arterial embolus of lower extremity    Subjective:     Brief HPI:   This is a 69-year-old male, who has distantly known to Dr. Reid who was last seen in 2016, history of CAD, HTN, HLD, PVD, GERD, tobacco use, medical noncompliance.  He said he has not seen a physician in over 4 years and has not taken any medicines since then.  He presented to [facility] ER with complaints of left lower extremity burning started the day prior to arrival.  Patient was found to have occlusion of the entire left leg.  CTA revealed thrombus in the distal thoracic aorta as well as occlusions bilateral iliac arteries.  BP was markedly elevated.  Initial troponin was 0.526.  Overnight he had wide complex tachycardia noted on monitor.  EKG reveals sinus rhythm with PVCs and bifascicular block.  He denied chest pain.  [facility] has been consulted for a while, this tachycardia, NSTEMI, and vasculopathy.  Of note vascular surgery has been consulted for left lower leg arterial occlusion.     Hospital Course:   4.8.25: NAD noted. Underwent attempted embolectomy with vascular surgery. Was found to have significant atherosclerotic disease and there were no options for meaningful vascularization. No further WCT on tele. Troponin trended down from admit.   4.9.25: NAD noted. VSS with BP above goal. Renal indices have normalized. Will start Entresto. Denies CP/SOB/Palps.     PMH: CAD, HTN, HLD, PVD, GERD, tobacco use, medical noncompliance  PSH: neck surgery, hip surgery, peripheral angiogram   Social History: current tobacco use, Denies EtOH and illicit drug use  Family History: Father-CAD, Mother-CAD/CABG      Previous Cardiac Diagnostics:   Echo 4.7.25  Left Ventricle: The left ventricle is normal in size. There is concentric remodeling. Moderate global hypokinesis present. Septal motion is abnormal is consistent with bundle branch block. There is moderately reduced systolic function with a visually estimated ejection fraction of 35 - 40%. Grade I diastolic dysfunction.  Right Ventricle: The right ventricle is normal in size. Systolic function is normal.  Left Atrium: Mildly dilated  Aortic Valve: Aortic valve peak velocity is 1.1 m/s. Mean gradient is 3 mmHg.    CTA with runoff 4.6.25  There is no significant interval change in the 2.4 cm low attenuation nodule at the medial limb of the right adrenal gland on image 21 series 4. Correlate with clinical and laboratory findings as regards further evaluation and follow-up.   There is interval progession in the focal areas of decreased enhancement with associated mild perinephric fat stranding in the lower pole of the left kidney. This may represent a non expansile neoplastic process, pyelonephritis is not entirely excluded. Correlate with clinical and laboratory findings as regards further evaluation and follow-up.   There is peripherally located thrombus in the distal thoracic aorta through to the suprailiac aorta with associated mild to moderate luminal narrowing. There is total occlusion of the superior mesenteric artery on image 146 series 16, inferior mesenteric artery on image 257 as well as right common iliac artery. There is no enhancement noted in the right internal and external iliac arteries.   There is total occlusion of the left internal iliac artery at its origin without enhancement in the distal segment.   There is total occlusion of the left common femoral artery on image 203 series 15 without enhancement in the left superficial femoral, popliteal and trifurcation vessels. The delayed phase images show no flow in the left popliteal artery with possibly  some minimal patchy reconstituted flow in scattered short segments of the trifurcation vessels with posterior tibial artery flow at the ankle. Correlate clinically as regards additional evaluation and follow-up possibly with digital subtraction angiography if clinically indicated.   The early phase imaging does not demonstrate flow in the right lower extremity arteries. The delayed phase images (series 37) demonstrates some reconstituted flow in the right distal superficial femoral artery and popliteal artery with pronounced calcification of the trifurcation vessels but with intermittent identification of contrast in the proximal trifurcation vessels with some contrast in the peroneal artery at the ankle with some reconstituted anterior and posterior tibial flow at the ankle as well. Consider follow-up with digital subtraction angiography if clinically indicated.   Details and other findings as discussed above.      LLE arterial US 4.6.25  No flow is demonstrated in the arteries of the left lower extremity         Review of Systems   Constitutional: Negative for chills and fever.   Cardiovascular:  Negative for chest pain and palpitations.   Respiratory:  Negative for shortness of breath.    Psychiatric/Behavioral:  Negative for altered mental status.            Objective:     Vital Signs (Most Recent):  Temp: 97.7 °F (36.5 °C) (04/09/25 1112)  Pulse: 79 (04/09/25 1112)  Resp: 19 (04/09/25 1112)  BP: (!) 140/71 (04/09/25 1112)  SpO2: 95 % (04/09/25 1112) Vital Signs (24h Range):  Temp:  [97.5 °F (36.4 °C)-98.7 °F (37.1 °C)] 97.7 °F (36.5 °C)  Pulse:  [] 79  Resp:  [18-24] 19  SpO2:  [95 %-97 %] 95 %  BP: (120-220)/(71-96) 140/71     Weight: 70.1 kg (154 lb 8.7 oz)  Body mass index is 20.39 kg/m².    SpO2: 95 %       No intake or output data in the 24 hours ending 04/09/25 1134      Lines/Drains/Airways       Peripheral Intravenous Line  Duration                  Peripheral IV - Single Lumen 04/06/25 18 G  "Left;Posterior Forearm 3 days         Peripheral IV - Single Lumen 04/06/25 20 G Anterior;Right Forearm 3 days                    Significant Labs: CMP   Recent Labs   Lab 04/09/25  0523   *   K 4.3   CL 96*   CO2 18*   BUN 20.1   CREATININE 1.19   CALCIUM 9.2   ALBUMIN 4.0   BILITOT 0.8   ALKPHOS 51   AST 29   ALT 6   , CBC   Recent Labs   Lab 04/09/25  0523   WBC 12.89*   HGB 13.9*   HCT 41.9*      , and Troponin No results for input(s): "TROPONINIHS" in the last 48 hours.    Telemetry:  SR    Physical Exam:  Physical Exam  Constitutional:       Appearance: Normal appearance.   HENT:      Head: Normocephalic.   Eyes:      Extraocular Movements: Extraocular movements intact.      Conjunctiva/sclera: Conjunctivae normal.   Cardiovascular:      Rate and Rhythm: Normal rate and regular rhythm.      Pulses: Normal pulses.      Heart sounds: Normal heart sounds.   Pulmonary:      Effort: Pulmonary effort is normal.      Breath sounds: Normal breath sounds.   Abdominal:      Palpations: Abdomen is soft.   Musculoskeletal:         General: Normal range of motion.      Cervical back: Neck supple.   Skin:     General: Skin is dry.      Comments: BLE cold to touch from mid thigh down to feet.    Neurological:      Mental Status: He is alert and oriented to person, place, and time. Mental status is at baseline.   Psychiatric:         Mood and Affect: Mood normal.         Behavior: Behavior normal.         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 Thank you for allowing us to participate in the care of this patient. Please call us with any questions.    Case and plan discussed with Dr. Silver Schmitz [facility]  Cardiology  [facility] - 9th Floor Medical Telemetry  04/09/2025         [1]   Current Facility-Administered Medications:     acetaminophen tablet 650 mg, 650 mg, Oral, Q4H PRN, Reyes, Thairy G, DO    albuterol-ipratropium 2.5 mg-0.5 mg/3 mL nebulizer solution 3 mL, 3 mL, Nebulization, Q6H PRN, Reyes, Thairy G, DO    ALPRAZolam tablet 0.5 mg, 0.5 mg, Oral, TID PRN, Deng Sheehan MD    aluminum-magnesium hydroxide-simethicone 200-200-20 mg/5 mL suspension 30 mL, 30 mL, Oral, QID PRN, Reyes, Thairy G, DO    ammonium lactate 12 % lotion, , Topical (Top), BID PRN, Reyes, Thairy G, DO    atorvastatin tablet 80 mg, 80 mg, Oral, Daily, Reyes, Thairy G, DO, 80 mg at 04/09/25 0938    bisacodyL suppository 10 mg, 10 mg, Rectal, Daily PRN, Reyes, Thairy G, DO    carvediloL tablet 6.25 mg, 6.25 mg, Oral, BID, Yogi Schmitz, [facility], 6.25 mg at 04/09/25 0937    dextrose 50% injection 12.5 g, 12.5 g, Intravenous, PRN, Reyes, Thairy G, DO    dextrose 50% injection 12.5 g, 12.5 g, Intravenous, PRN, Pete Tatum MD    dextrose 50% injection 25 g, 25 g, Intravenous, PRN, Reyes, Thairy G, DO    glucagon (human recombinant) injection 1 mg, 1 mg, Intramuscular, PRN, Reyes, Thairy G, DO    glucose chewable tablet 16 g, 16 g, Oral, PRN, ReyesDongry G, DO    glucose chewable tablet 24 g, 24 g, Oral, PRN, Reyes, Thairy G, DO    heparin 25,000 units in dextrose 5% (100 units/ml) IV bolus from bag HIGH INTENSITY nomogram - [facility], 80 Units/kg, Intravenous, Once, Reyes, Thairy G, DO    heparin 25,000 units in dextrose  5% (100 units/ml) IV bolus from bag HIGH INTENSITY nomogram - [facility], 60 Units/kg, Intravenous, PRN, Reyes, Thairy G, DO, 4,356 Units at 04/08/25 1554    heparin 25,000 units in dextrose 5% (100 units/ml) IV bolus from bag HIGH INTENSITY nomogram - [facility], 30 Units/kg, Intravenous, PRN, Reyes, Thairy G, DO, 2,178 Units at 04/09/25 0618    heparin 25,000 units in dextrose 5% 250 mL (100 units/mL) infusion HIGH INTENSITY nomogram - [facility], 0-40 Units/kg/hr, Intravenous, Continuous, Reyes, Thairy G, DO, Last Rate: 16 mL/hr at 04/09/25 0615, 22 Units/kg/hr at 04/09/25 0615    hydrALAZINE injection 10 mg, 10 mg, Intravenous, Q4H PRN, Yifan Cottrell MD, 10 mg at 04/08/25 2256    HYDROcodone-acetaminophen  mg per tablet 1 tablet, 1 tablet, Oral, Q6H PRN, Deng Sheehan MD    HYDROmorphone tablet 2 mg, 2 mg, Oral, Q4H PRN, Deng Sheehan MD, 2 mg at 04/09/25 0938    labetaloL injection 10 mg, 10 mg, Intravenous, QID PRN, Reyes, Thairy G, DO, 10 mg at 04/08/25 0138    melatonin tablet 9 mg, 9 mg, Oral, Nightly PRN, Reyes, Thairy G, DO    naloxone 0.4 mg/mL injection 0.02 mg, 0.02 mg, Intravenous, PRN, Reyes, Thairy G, DO    nicotine 14 mg/24 hr 1 patch, 1 patch, Transdermal, Daily PRN, Reyes, Thairy G, DO    NIFEdipine 24 hr tablet 30 mg, 30 mg, Oral, Daily, Reyes, Thairy G, DO, 30 mg at 04/09/25 0938    ondansetron disintegrating tablet 8 mg, 8 mg, Oral, Q8H PRN, Reyes, Thairy G, DO    ondansetron injection 4 mg, 4 mg, Intravenous, Q8H PRN, Reyes, Thairy G, DO, 4 mg at 04/08/25 0149    polyethylene glycol packet 17 g, 17 g, Oral, TID PRN, Reyes, Thairy G, DO    sacubitriL-valsartan 24-26 mg per tablet 1 tablet, 1 tablet, Oral, BID, Yogi Schmitz, [facility]    simethicone chewable tablet 80 mg, 1 tablet, Oral, QID PRN, Reyes, Thairy G, DO    sodium chloride 0.9% flush 10 mL, 10 mL, Intravenous, PRN, Reyes, Thairy G,

## 2025-04-09 NOTE — ASSESSMENT & PLAN NOTE
Hyponatremia is likely due to Dehydration/hypovolemia. The patient's most recent sodium results are listed below.  Recent Labs     04/06/25  1849 04/07/25  0006 04/09/25  0523   * 136 132*     Plan  - Correct the sodium by 4-6mEq in 24 hours.   - Obtain the following studies: none.  - Will treat the hyponatremia with stable  - Monitor sodium Daily.   - Patient hyponatremia is stable  - .

## 2025-04-09 NOTE — ASSESSMENT & PLAN NOTE
The likely etiology of thrombocytopenia is liver disease. The patients 3 most recent labs are listed below.  Recent Labs     04/06/25  1849 04/06/25  2158 04/09/25  0523    148 221     Plan  - Will transfuse if platelet count is <20k.  - .

## 2025-04-10 ENCOUNTER — ANESTHESIA EVENT (OUTPATIENT)
Dept: SURGERY | Facility: HOSPITAL | Age: 70
End: 2025-04-10
Payer: MEDICARE

## 2025-04-10 NOTE — PROGRESS NOTES
Pt seen at bedside.  As before, he is nonambulatory and has extensive BLE thromboembolism along with severe PAD.  Complete embolectomy is not possible in light of his severe PAD and bypass is not possible secondary to no target vessels.  He will benefit from Surgery evaluation for B AKA especially in light of his chronic nonambulatory status.  This was discussed and he agrees.

## 2025-04-10 NOTE — PROGRESS NOTES
[facility] Medicine Progress Note        Chief Complaint: Inpatient Follow-up     HPI:   69-year-old male with no known medical condition as he states he has not seen a physician in 4 years. Prior to that he says he took a proximally 15 medications on a daily basis but has not taken anything for the last 4 years. Patient presents today with complaint of left lower extremity burning that started yesterday.      Vascular surgery evaluated.  Performed angiography.  He has bilateral lower extremity atherosclerotic disease along with acute and subacute thromboembolism.  That has no options for mean for revascularization.  The recommending likely amputation.    Interval Hx:  Patient doing okay this morning.  Still significant pain in the bilateral lower extremities.  After conversations with vascular surgery yesterday he is amenable to bilateral AKA due to clot burden.  He has no option for meaningful revascularization.  He is nonambulatory at baseline.  He is focused solely on pain controlled.    Objective/physical exam:  General: In no acute distress, afebrile  Chest: Clear to auscultation bilaterally  Heart: RRR, +S1, S2, no appreciable murmur  Abdomen: Soft, nontender, BS +  MSK:  Bilateral lower extremity atrophy, cool to touch, poor peripheral pulses Neurologic: Alert and oriented x4, Cranial nerve II-XII intact, Strength 5/5 in all 4 extremities    VITAL SIGNS: 24 HRS MIN & MAX LAST   Temp  Min: 97.4 °F (36.3 °C)  Max: 98 °F (36.7 °C) 97.5 °F (36.4 °C)   BP  Min: 121/79  Max: 167/83 (!) 167/83   Pulse  Min: 51  Max: 79  60   Resp  Min: 16  Max: 20 19   SpO2  Min: 95 %  Max: 98 % 97 %       Recent Labs   Lab 04/06/25  2158 04/09/25  0523 04/10/25  0621   WBC 13.74* 12.89* 8.84   RBC 5.21 5.19 4.87   HGB 14.2 13.9* 13.1*   HCT 41.8* 41.9* 40.5*   MCV 80.2 80.7 83.2   MCH 27.3 26.8* 26.9*   MCHC 34.0 33.2 32.3*   RDW 15.9 15.8 15.7    221 213   MPV 11.8* 10.2 10.8*        Recent Labs   Lab 04/07/25  0006 04/09/25  0523 04/10/25  0621    132* 129*   K 3.5 4.3 3.7    96* 94*   CO2 20* 18* 22*   BUN 16.9 20.1 26.3*   CREATININE 1.31* 1.19 1.18   CALCIUM 8.2* 9.2 8.6*   MG  --  2.20 2.30   ALBUMIN 2.7* 4.0 3.6   ALKPHOS 40 51 47   ALT 5 6 9   AST 11 29 39   BILITOT 0.4 0.8 0.6          Microbiology Results (last 7 days)       ** No results found for the last 168 hours. **             Radiology:  Echo    Left Ventricle: The left ventricle is normal in size. There is   concentric remodeling. Moderate global hypokinesis present. Septal motion   is abnormal is consistent with bundle branch block. There is moderately   reduced systolic function with a visually estimated ejection fraction of   30 - 40%. Grade I diastolic dysfunction.    Right Ventricle: The right ventricle is normal in size. Systolic   function is normal.    Left Atrium: Mildly dilated    Aortic Valve: Aortic valve peak velocity is 1.1 m/s. Mean gradient is 3   mmHg.  CTA Runoff ABD PEL Bilat Lower Ext  Narrative: EXAMINATION:  CTA RUNOFF ABD PEL BILAT LOWER EXT    CLINICAL HISTORY:  Arterial embolism, lower extremity;Claudication or leg ischemia;    TECHNIQUE:  Arterial phase axial CT images were obtained through the abdomen and pelvis following IV administration of IV contrast. Coronal and sagittal reconstructions were performed. 3D reconstructions were submitted and interpreted. Dose reduction techniques including automatic exposure control (AEC) were utilized.  Dose (DLP): 1834 mGycm    COMPARISON:  08/19/2024 CT abdomen and pelvis    FINDINGS:  Thorax:    Lungs: There is mild nonspecific dependent change at the lung bases. No focal infiltrate or consolidation is seen.    Pleura: No effusions or thickening.    Heart: The heart size is within normal limits. Moderate coronary artery calcification is seen.    Abdomen:    Abdominal Wall: No abdominal wall pathology is seen.    Liver: The liver appears  unremarkable.    Biliary System: No intrahepatic or extrahepatic biliary duct dilatation is seen.    Gallbladder: The gallbladder appears unremarkable.    Pancreas: Mild pancreatic atrophy is seen.    Spleen: The spleen appears unremarkable.    Adrenals: There is no significant interval change in the 2.4 cm low attenuation nodule at the medial limb of the right adrenal gland on image 21 series 4.    Kidneys: There is focal cortical thinning in the right kidney consistent with scarring. There is interval progression in the focal areas of decreased enhancement with associated mild perinephric fat stranding in the lower pole of the left kidney.    Bowel:    Esophagus: The visualized esophagus appears unremarkable.    Stomach: The stomach appears unremarkable.    Duodenum: Unremarkable appearing duodenum.    Small Bowel: The small bowel appears unremarkable.    Colon: Nondistended.    Appendix: The appendix is not identified but no inflammatory changes are seen in the right lower quadrant to suggest appendicitis.    Peritoneum: No intraperitoneal free air or ascites is seen.    Pelvis:    Bladder: Th bladder appears unremarkable.    Male:    Prostate gland: There are a few calcifications in the prostate gland.    Bony structures:    Dorsal Spine: There is moderate spondylosis of the visualized dorsal spine.    Bony Pelvis: There is mild degenerative change of the bilateral hips.    Hip: The visualized structures of the hip appear unremarkable.    Lower extremity bony structures: The bones of the left lower extremity appear intact. The bones of the right lower extremity appear intact.    Vascular Structures:    Aorta: There is moderate calcification of the abdominal aorta. There is peripherally located thrombus in the distal thoracic aorta extending inferiorly to the distal abdominal aorta/bifurcation with associated mild to moderate distal abdominal aortic stenosis.  There is total occlusion of the superior mesenteric  artery on image 146 series 16 with reconstitution of flow seen in the distal branches through gastroduodenal collaterals with the celiac artery.  There is total occlusion of the inferior mesenteric artery on image 257 a with contrast opacifying the distal branches through collaterals.  Right iliac arteries: There is total occlusion of the right common iliac artery with no enhancement noted in the right internal and external iliac arteries.    Left Common Iliac Artery: Left common iliac stent is seen in place and appears patent. There is total occlusion of the left internal iliac artery at its origin without enhancement in the distal segment.    Right Lower extremity arteries: The early phase imaging does not demonstrate flow in the right lower extremity arteries. The delayed phase images (series 37) demonstrates some reconstituted flow in the right distal superficial femoral artery and popliteal artery with pronounced calcification of the trifurcation vessels but with intermittent identification of contrast in the proximal trifurcation vessels with some contrast in the peroneal artery at the ankle with some reconstituted anterior and posterior tibial flow at the ankle as well.    Left Lower extremity arteries: There is total occlusion of the left common femoral artery on image 203 series 15 without enhancement in the left superficial femoral, popliteal and trifurcation vessels. The delayed phase images show no flow in the left popliteal artery with possibly some minimal patchy reconstituted flow in scattered short segments of the trifurcation vessels with posterior tibial artery flow at the ankle.  Impression: 1. Interval progression in the focal areas of decreased enhancement with associated mild perinephric fat stranding in the lower pole of the left kidney. This may represent a non expansile neoplastic process, pyelonephritis is not entirely excluded. Correlate with clinical and laboratory findings as regards  further evaluation and follow-up.  2. Peripherally thrombus in the distal thoracic aorta through to the distal abdominal aorta/bifurcation with associated mild to moderate aortic stenosis.  3. 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  Moderate stenosis of the origin of the celiac artery is also noted.  Correlate for enteric hypoperfusion symptoms.  4. Total occlusion of the right common iliac, bilateral internal iliac, and right external iliac arteries.  5. Total occlusion of the left common femoral artery on image 203 series 15 without enhancement in the left superficial femoral, popliteal and trifurcation vessels. The delayed phase images show no flow in the left popliteal artery with possibly some minimal patchy reconstituted flow in scattered short segments of the trifurcation vessels with posterior tibial artery flow at the ankle. Correlate clinically as regards additional evaluation and follow-up possibly with digital subtraction angiography if clinically indicated.  6. Early phase imaging does not demonstrate flow in the right lower extremity arteries. The delayed phase images (series 37) demonstrates some reconstituted flow in the right distal superficial femoral artery and popliteal artery with pronounced calcification of the trifurcation vessels but with intermittent identification of contrast in the proximal trifurcation vessels with some contrast in the peroneal artery at the ankle with some reconstituted anterior and posterior tibial flow at the ankle as well. Consider follow-up with digital subtraction angiography if clinically indicated.  7. Details and other findings as discussed above.  [facility]    Electronically signed by: Sarthak Holland  Date:    04/07/2025  Time:    11:34        Medications:  Scheduled Meds:   atorvastatin  80 mg Oral  Daily    carvediloL  6.25 mg Oral BID    heparin (PORCINE)  80 Units/kg Intravenous Once    NIFEdipine  30 mg Oral Daily    sacubitriL-valsartan  1 tablet Oral BID     Continuous Infusions:   heparin (porcine) in D5W  0-40 Units/kg/hr Intravenous Continuous 14.5 mL/hr at 04/10/25 0754 20 Units/kg/hr at 04/10/25 0754     PRN Meds:.  Current Facility-Administered Medications:     acetaminophen, 650 mg, Oral, Q4H PRN    albuterol-ipratropium, 3 mL, Nebulization, Q6H PRN    ALPRAZolam, 0.5 mg, Oral, TID PRN    aluminum-magnesium hydroxide-simethicone, 30 mL, Oral, QID PRN    ammonium lactate, , Topical (Top), BID PRN    bisacodyL, 10 mg, Rectal, Daily PRN    dextrose 50%, 12.5 g, Intravenous, PRN    dextrose 50%, 12.5 g, Intravenous, PRN    dextrose 50%, 25 g, Intravenous, PRN    glucagon (human recombinant), 1 mg, Intramuscular, PRN    glucose, 16 g, Oral, PRN    glucose, 24 g, Oral, PRN    heparin (PORCINE), 60 Units/kg, Intravenous, PRN    heparin (PORCINE), 30 Units/kg, Intravenous, PRN    hydrALAZINE, 10 mg, Intravenous, Q4H PRN    HYDROcodone-acetaminophen, 1 tablet, Oral, Q6H PRN    HYDROmorphone, 2 mg, Oral, Q4H PRN    labetalol, 10 mg, Intravenous, QID PRN    melatonin, 9 mg, Oral, Nightly PRN    naloxone, 0.02 mg, Intravenous, PRN    nicotine, 1 patch, Transdermal, Daily PRN    ondansetron, 8 mg, Oral, Q8H PRN    ondansetron, 4 mg, Intravenous, Q8H PRN    polyethylene glycol, 17 g, Oral, TID PRN    simethicone, 1 tablet, Oral, QID PRN    sodium chloride 0.9%, 10 mL, Intravenous, PRN    Nutrition:  Nutrition consulted. Most recent weight and BMI monitored-     Measurements:  Wt Readings from Last 1 Encounters:   04/07/25 70.1 kg (154 lb 8.7 oz)   Body mass index is 20.39 kg/m².    Patient has been screened and assessed by RD.    Malnutrition Type:  Context:    Level:      Malnutrition Characteristic Summary:       Interventions/Recommendations (treatment strategy):           Assessment/Plan:   Left internal  iliac/common femoral occlusion   Left lower extremity ischemia   Distal thoracic aortic thrombus   SMA occlusion   NSTEMI, type 2 demand ischemia   Hypertensive urgency   Acute on chronic kidney disease, unknown baseline   Adrenal adenoma   Tobacco abuse      Appreciate vascular surgery recommendations.  Will consult General surgery today for consideration of bilateral lower extremity amputation.    He remains on a heparin drip for now.    Reactive leukocytosis improved.  Otherwise chronic medical issues are stable.  Cardiology following as well.  Recommending high-intensity statin and aggressive blood pressure regimen.  They have signed off recommending outpatient stress test.       Critical care diagnosis:  Critical limb ischemia requiring heparin drip  Critical care interventions: hands on evaluation, review of labs/radiographs/records and discussions with family  Critical care time spent: >32 minutes      Yifan Cottrell MD   04/10/2025     All diagnosis and differential diagnosis have been reviewed; assessment and plan has been documented; I have personally reviewed the labs and test results that are presently available; I have reviewed the patients medication list; I have reviewed the consulting providers response and recommendations. I have reviewed or attempted to review medical records based upon their availability    All of the patient's questions have been  addressed and answered. Patient's is agreeable to the above stated plan. I will continue to monitor closely and make adjustments to medical management as needed.  _____________________________________________________________________

## 2025-04-10 NOTE — CONSULTS
Patient Name: Prasanna Whiting   MRN: [id number]   Admission Date: 4/6/2025   Hospital Length of Stay: 4   Attending Provider: Yifan Cottrell MD   Consulting Provider: Abi VILLANUEVA  Reason for Consult: Goals of Care  Primary Care Physician: Mary, Primary Doctor     Principal Problem: Arterial embolus of lower extremity     Patient information was obtained from patient and ER records.      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 We discussed the patient's goals of care and treatment preferences.  I clarified current code status. I identified the surrogate decision maker or health care POA.  I answered all questions and we formulated a plan including recommendations for symptom management and how to best achieve goals of care.  Advance Care Planning     Date: 04/10/2025    [facility]  I engaged the patient in a voluntary conversation about advance care planning and we specifically addressed what the goals of care would be moving forward, in light of the patient's change in clinical status, specifically current condition.  We did specifically address the patient's likely prognosis, which is fair .  We explored the patient's values and preferences for future care.  The family endorses that what is most important right now is to focus on curative/life-prolongation (regardless of treatment burdens)    Accordingly, we have decided that the best plan to meet the patient's goals includes continuing with treatment  This discussion occurred on a fully voluntary basis with the verbal consent of the patient and/or family.            Met with patient and introduced services. Patient is not  and has 5 children. He lives at home with his daughter, Norma. He states he has  lived with her for the past year or so. He gets around in a wheelchair.     We discussed his current condition. He reports extremely pain currently. He is sitting up and shaking the bed. Reports a 9 out of 10. I informed him that I would go speak to the nurse and see when was the last time he received any pain medications.     Spoke to RN. His last dose of IV Dilaudid was 3 hours ago and he and his daughter are reportedly refusing any oral pain medication. His Dilaudid is ordered every 4 hours as needed. I placed a one time dose of 1 mg dilaudid to give now in order for me to continue our conversation once his pain is better controlled.     When I went back to his room approximately 30 minutes later. He has been given the dilaudid and states he feels loopy. I explained that is the problem with IV pain medication. Yes it does relieve pain, but it is very short lived and comes with side effects. I explained that it is important to try and take oral medication as well. Oral pain meds will last longer and therefore likely control his pain better. I informed him that I would not stop his IV medicine and he could continue to get it for breakthrough pain, but we really need to try to take oral as well. He is agreeable. Hopefully this ischemic pain will resolve with surgery.         Recommendations:     Will change oral pain medicine to oxycodone IR 15mg every 4 hr PRN pain. Once patient has bilateral AKAs tomorrow, oral pain medicine can likely be deescalated.       History of Present Illness:     This is a 69-year-old male with a past medical history of hypertension, cervical stenosis status post fusion in 2019 which left him immobile.  He reportedly stopped seeing physicians approximately 4 years ago along with any medications.  He presented to the ED on 04/06/2025 with complaints of left lower extremity pain.  CT imaging with extensive thromboembolism of his bilateral lower extremities extending from his iliac vessels  to the feet.  Vascular surgery evaluated and performed a right iliac artery thrombectomy, left iliac artery/SFA thrombectomy, and left popliteal/tibial thrombectomy on 04/08.  Complete embolectomy is not possible given his severe PID and bypass is also not indicated. Recommendations or for bilateral AKA and general surgery was consulted. Palliative medicine consulted for goals of care discussion.       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    Bilateral arm weakness 07/26/2019    S/P cervical spinal fusion 12/18/2019     Resolved Ambulatory Problems     Diagnosis Date Noted    No Resolved Ambulatory Problems     Past Medical History:   Diagnosis Date    Hepatitis     Hypertension     Mental disorder         Past Surgical History:   Procedure Laterality Date    EMBOLECTOMY, LOWER EXTREMITY Bilateral 4/7/2025    Procedure: EMBOLECTOMY, LOWER EXTREMITY;  Surgeon: Anjum Lucero III, MD;  Location: [facility];  Service: Peripheral Vascular;  Laterality: Bilateral;  BILAT LOWER EXTREMITY EMBOLECTOMY    FRACTURE SURGERY      SPINE SURGERY  09/04/2019    ACDF @ C3/4, 09/04/19, [facility], DR. SIN.    SPINE SURGERY  10/18/2019    C3-6 DCL, [facility], DR. SIN.        Review of patient's allergies indicates:  No Known Allergies     Current Medications[1]       Current Facility-Administered Medications:     acetaminophen, 650 mg, Oral, Q4H PRN    albuterol-ipratropium, 3 mL, Nebulization, Q6H PRN    ALPRAZolam, 0.5 mg, Oral, TID PRN    aluminum-magnesium hydroxide-simethicone, 30 mL, Oral, QID PRN    ammonium lactate, , Topical (Top), BID PRN    bisacodyL, 10 mg, Rectal, Daily PRN    dextrose 50%, 12.5 g, Intravenous, PRN    dextrose 50%, 12.5 g, Intravenous, PRN    dextrose 50%, 25 g, Intravenous, PRN    glucagon (human recombinant), 1 mg, Intramuscular, PRN    glucose, 16 g, Oral, PRN    glucose, 24 g, Oral, PRN    heparin (PORCINE), 60  "Units/kg, Intravenous, PRN    heparin (PORCINE), 30 Units/kg, Intravenous, PRN    hydrALAZINE, 10 mg, Intravenous, Q4H PRN    HYDROcodone-acetaminophen, 1 tablet, Oral, Q6H PRN    HYDROmorphone, 2 mg, Intravenous, Q4H PRN    labetalol, 10 mg, Intravenous, QID PRN    melatonin, 9 mg, Oral, Nightly PRN    naloxone, 0.02 mg, Intravenous, PRN    nicotine, 1 patch, Transdermal, Daily PRN    ondansetron, 8 mg, Oral, Q8H PRN    ondansetron, 4 mg, Intravenous, Q8H PRN    polyethylene glycol, 17 g, Oral, TID PRN    simethicone, 1 tablet, Oral, QID PRN    sodium chloride 0.9%, 10 mL, Intravenous, PRN     Family History   Problem Relation Name Age of Onset    Heart disease Mother      Cancer Father          Review of Systems   Musculoskeletal:         Bilateral leg pain            Objective:   /64 (Patient Position: Lying)   Pulse (!) 54   Temp 97.9 °F (36.6 °C) (Oral)   Resp 19   Ht 6' 1" (1.854 m)   Wt 70.1 kg (154 lb 8.7 oz)   SpO2 96%   BMI 20.39 kg/m²      Physical Exam  Constitutional:       General: He is awake.      Appearance: He is ill-appearing.   HENT:      Head: Normocephalic and atraumatic.   Cardiovascular:      Rate and Rhythm: Normal rate.   Pulmonary:      Effort: Pulmonary effort is normal.   Neurological:      Mental Status: He is alert.             Review of Symptoms      Symptom Assessment (ESAS 0-10 Scale)  Pain:  0  Dyspnea:  0  Anxiety:  0  Nausea:  0  Depression:  0  Anorexia:  0  Fatigue:  0  Insomnia:  0  Restlessness:  0  Agitation:  0         Living Arrangements:  Lives with family    Psychosocial/Cultural:   See Palliative Psychosocial Note: Yes  Not ; 5 children, lives with daughter.   **Primary  to Follow**  Palliative Care  Consult: No      Advance Care Planning   Advance Directives:     Decision Making:  Patient answered questions  Goals of Care: The patient endorses that what is most important right now is to focus on curative/life-prolongation " (regardless of treatment burdens)    Accordingly, we have decided that the best plan to meet the patient's goals includes continuing with treatment          PAINAD: NA    Caregiver burden formerly assessed: Yes        > 50% of 60 min of encounter was spent in chart review, face to face discussion of goals of care, symptom assessment, coordination of care and emotional support.         Abi Marcus, P  Palliative Medicine  [facility]         [1]   Current Facility-Administered Medications:     acetaminophen tablet 650 mg, 650 mg, Oral, Q4H PRN, Reyes, Thairy G, DO    albuterol-ipratropium 2.5 mg-0.5 mg/3 mL nebulizer solution 3 mL, 3 mL, Nebulization, Q6H PRN, Reyes Thairobyn G, DO    ALPRAZolam tablet 0.5 mg, 0.5 mg, Oral, TID PRN, Deng Sheehan MD, 0.5 mg at 04/09/25 2058    aluminum-magnesium hydroxide-simethicone 200-200-20 mg/5 mL suspension 30 mL, 30 mL, Oral, QID PRN, Reyes, Thairy G, DO    ammonium lactate 12 % lotion, , Topical (Top), BID PRN, Reyes Thairy G, DO    atorvastatin tablet 80 mg, 80 mg, Oral, Daily, Reyes, Thairy G, DO, 80 mg at 04/10/25 0819    bisacodyL suppository 10 mg, 10 mg, Rectal, Daily PRN, ReyesDongry G, DO    carvediloL tablet 6.25 mg, 6.25 mg, Oral, BID, Yogi Schmitz, [facility], 6.25 mg at 04/09/25 2053    dextrose 50% injection 12.5 g, 12.5 g, Intravenous, PRN, Reyes, Thairy G, DO    dextrose 50% injection 12.5 g, 12.5 g, Intravenous, PRN, Pete Tatum MD    dextrose 50% injection 25 g, 25 g, Intravenous, PRN, Reyes, Deshaun G, DO    glucagon (human recombinant) injection 1 mg, 1 mg, Intramuscular, PRN, Reyes, Thairy G, DO    glucose chewable tablet 16 g, 16 g, Oral, PRN, Reyes, Thairy G, DO    glucose chewable tablet 24 g, 24 g, Oral, PRN, Reyes, Thairy G, DO    heparin 25,000 units in dextrose 5% (100 units/ml) IV bolus from bag HIGH INTENSITY nomogram - [facility], 80 Units/kg, Intravenous, Once, Reyes, Thairy G, DO    heparin 25,000 units in  dextrose 5% (100 units/ml) IV bolus from bag HIGH INTENSITY nomogram - [facility], 60 Units/kg, Intravenous, PRN, Reyes, Thairy G, DO, 4,356 Units at 04/08/25 1554    heparin 25,000 units in dextrose 5% (100 units/ml) IV bolus from bag HIGH INTENSITY nomogram - [facility], 30 Units/kg, Intravenous, PRN, Reyes, Thairy G, DO, 2,178 Units at 04/09/25 0618    heparin 25,000 units in dextrose 5% 250 mL (100 units/mL) infusion HIGH INTENSITY nomogram - [facility], 0-40 Units/kg/hr, Intravenous, Continuous, Reyes, Thairy G, DO, Last Rate: 14.5 mL/hr at 04/10/25 0754, 20 Units/kg/hr at 04/10/25 0754    hydrALAZINE injection 10 mg, 10 mg, Intravenous, Q4H PRN, Yifan Cottrell MD, 10 mg at 04/08/25 2256    HYDROcodone-acetaminophen  mg per tablet 1 tablet, 1 tablet, Oral, Q6H PRN, Deng Sheehan MD, 1 tablet at 04/10/25 0735    HYDROmorphone (PF) injection 2 mg, 2 mg, Intravenous, Q4H PRN, Yifan Cottrell MD, 2 mg at 04/10/25 1041    labetaloL injection 10 mg, 10 mg, Intravenous, QID PRN, Reyes, Thairy G, DO, 10 mg at 04/08/25 0138    melatonin tablet 9 mg, 9 mg, Oral, Nightly PRN, Reyes, Thairy G, DO    naloxone 0.4 mg/mL injection 0.02 mg, 0.02 mg, Intravenous, PRN, Reyes, Thairy G, DO    nicotine 14 mg/24 hr 1 patch, 1 patch, Transdermal, Daily PRN, Reyes, Thairy G, DO    NIFEdipine 24 hr tablet 30 mg, 30 mg, Oral, Daily, Reyes, Thairy G, DO, 30 mg at 04/10/25 0819    ondansetron disintegrating tablet 8 mg, 8 mg, Oral, Q8H PRN, Reyes, Thairy G, DO    ondansetron injection 4 mg, 4 mg, Intravenous, Q8H PRN, Reyes, Thairy G, , 4 mg at 04/08/25 0149    polyethylene glycol packet 17 g, 17 g, Oral, TID PRN, Reyes, Thairy G,     sacubitriL-valsartan 24-26 mg per tablet 1 tablet, 1 tablet, Oral, BID, Yogi Schmitz, [facility], 1 tablet at 04/10/25 0819    simethicone chewable tablet 80 mg, 1 tablet, Oral, QID PRN, Reyes, Thairy G, DO    sodium chloride 0.9% flush 10 mL, 10 mL, Intravenous, PRN, Reyes, Thairy G, DO

## 2025-04-10 NOTE — PLAN OF CARE
Problem: Adult Inpatient Plan of Care  Goal: Plan of Care Review  Outcome: Progressing  Goal: Patient-Specific Goal (Individualized)  Outcome: Progressing  Goal: Absence of Hospital-Acquired Illness or Injury  Outcome: Progressing  Goal: Optimal Comfort and Wellbeing  Outcome: Progressing  Goal: Readiness for Transition of Care  Outcome: Progressing     Problem: Infection  Goal: Absence of Infection Signs and Symptoms  Outcome: Progressing     Problem: Skin Injury Risk Increased  Goal: Skin Health and Integrity  Outcome: Progressing     Problem: Wound  Goal: Optimal Coping  Outcome: Progressing  Goal: Optimal Functional Ability  Outcome: Progressing  Goal: Absence of Infection Signs and Symptoms  Outcome: Progressing  Goal: Improved Oral Intake  Outcome: Progressing  Goal: Optimal Pain Control and Function  Outcome: Progressing  Goal: Skin Health and Integrity  Outcome: Progressing  Goal: Optimal Wound Healing  Outcome: Progressing     Problem: Fall Injury Risk  Goal: Absence of Fall and Fall-Related Injury  Outcome: Progressing     Problem: Coping Ineffective  Goal: Effective Coping  Outcome: Progressing

## 2025-04-10 NOTE — CONSULTS
Acute Care Surgery   Consult    Patient Name: Prasanna Whiting  YOB: 1955  Date: 04/10/2025 12:15 PM  Date of Admission: 4/6/2025  HD#4  POD#3 Days Post-Op    PRESENTING HISTORY   Chief Complaint/Reason for Admission: Arterial embolus of lower extremity  History source(s): patient  History of Present Illness:  69 year old male with PMH HTN, HLD, PVD, CAD, GERD, Tobacco use, and medical noncompliance who is admitted for management of bilateral lower extremity pain and ischemia. He has been immobile following spine surgery in 2019. Denies fever, chills. The pain causes him to be nauseous, sometimes vomiting. CTA imaging showed near total occlusion of bilateral lower extremity arterial supply d/t thromboembolism. Vascular surgery attempted to perform thrombectomy on 4/8 but this revealed extensive BLE atherosclerotic disease with acute and subacute thromboembolism. They reported no further options for revascularization and recommend elective bilateral AKA for symptom relief.    Review of Systems:  12 point ROS negative except as stated in HPI    PAST HISTORY:   Past medical history:  Past Medical History:   Diagnosis Date    Contusion of cervical cord     Hepatitis     Hypertension     Mental disorder        Past surgical history:  Past Surgical History:   Procedure Laterality Date    EMBOLECTOMY, LOWER EXTREMITY Bilateral 4/7/2025    Procedure: EMBOLECTOMY, LOWER EXTREMITY;  Surgeon: Anjum Lucero III, MD;  Location: [facility];  Service: Peripheral Vascular;  Laterality: Bilateral;  BILAT LOWER EXTREMITY EMBOLECTOMY    FRACTURE SURGERY      SPINE SURGERY  09/04/2019    ACDF @ C3/4, 09/04/19, [facility], DR. SIN.    SPINE SURGERY  10/18/2019    C3-6 DCL, [facility], DR. SIN.       Family history:  Family History   Problem Relation Name Age of Onset    Heart disease Mother      Cancer Father         Social history:  Social History     Socioeconomic History    Marital status: Single   Tobacco Use    Smoking status:  Every Day     Current packs/day: 0.50     Types: Cigarettes    Smokeless tobacco: Never   Substance and Sexual Activity    Alcohol use: Never    Drug use: Yes     Social Drivers of Health     Financial Resource Strain: Low Risk  (4/8/2025)    Overall Financial Resource Strain (CARDIA)     Difficulty of Paying Living Expenses: Not very hard   Food Insecurity: No Food Insecurity (4/8/2025)    Hunger Vital Sign     Worried About Running Out of Food in the Last Year: Never true     Ran Out of Food in the Last Year: Never true   Transportation Needs: No Transportation Needs (4/8/2025)    PRAPARE - Transportation     Lack of Transportation (Medical): No     Lack of Transportation (Non-Medical): No   Physical Activity: Inactive (4/8/2025)    Exercise Vital Sign     Days of Exercise per Week: 0 days     Minutes of Exercise per Session: 0 min   Stress: No Stress Concern Present (4/8/2025)    [employer] - Occupational Stress Questionnaire     Feeling of Stress : Not at all   Housing Stability: Low Risk  (4/8/2025)    Housing Stability Vital Sign     Unable to Pay for Housing in the Last Year: No     Homeless in the Last Year: No     Tobacco Use History[1]   Social History     Substance and Sexual Activity   Alcohol Use Never        MEDICATIONS & ALLERGIES:     No current facility-administered medications on file prior to encounter.     Current Outpatient Medications on File Prior to Encounter   Medication Sig    albuterol (PROVENTIL/VENTOLIN HFA) 90 mcg/actuation inhaler     ATROVENT HFA 17 mcg/actuation inhaler     baclofen (LIORESAL) 20 MG tablet     DULoxetine (CYMBALTA) 30 MG capsule     enalapril (VASOTEC) 20 MG tablet     finasteride (PROSCAR) 5 mg tablet     ibuprofen (ADVIL,MOTRIN) 100 mg/5 mL suspension     omeprazole (PRILOSEC) 40 MG capsule     predniSONE (DELTASONE) 20 MG tablet     rosuvastatin (CRESTOR) 20 MG tablet     VITAMIN D2 50,000 unit capsule      Allergies: Review of  "patient's allergies indicates:  No Known Allergies  Scheduled Meds:   atorvastatin  80 mg Oral Daily    carvediloL  6.25 mg Oral BID    heparin (PORCINE)  80 Units/kg Intravenous Once    NIFEdipine  30 mg Oral Daily    sacubitriL-valsartan  1 tablet Oral BID     Continuous Infusions:   heparin (porcine) in D5W  0-40 Units/kg/hr Intravenous Continuous 14.5 mL/hr at 04/10/25 0754 20 Units/kg/hr at 04/10/25 0754     PRN Meds:  Current Facility-Administered Medications:     acetaminophen, 650 mg, Oral, Q4H PRN    albuterol-ipratropium, 3 mL, Nebulization, Q6H PRN    ALPRAZolam, 0.5 mg, Oral, TID PRN    aluminum-magnesium hydroxide-simethicone, 30 mL, Oral, QID PRN    ammonium lactate, , Topical (Top), BID PRN    bisacodyL, 10 mg, Rectal, Daily PRN    dextrose 50%, 12.5 g, Intravenous, PRN    dextrose 50%, 12.5 g, Intravenous, PRN    dextrose 50%, 25 g, Intravenous, PRN    glucagon (human recombinant), 1 mg, Intramuscular, PRN    glucose, 16 g, Oral, PRN    glucose, 24 g, Oral, PRN    heparin (PORCINE), 60 Units/kg, Intravenous, PRN    heparin (PORCINE), 30 Units/kg, Intravenous, PRN    hydrALAZINE, 10 mg, Intravenous, Q4H PRN    HYDROcodone-acetaminophen, 1 tablet, Oral, Q6H PRN    HYDROmorphone, 2 mg, Intravenous, Q4H PRN    labetalol, 10 mg, Intravenous, QID PRN    melatonin, 9 mg, Oral, Nightly PRN    naloxone, 0.02 mg, Intravenous, PRN    nicotine, 1 patch, Transdermal, Daily PRN    ondansetron, 8 mg, Oral, Q8H PRN    ondansetron, 4 mg, Intravenous, Q8H PRN    polyethylene glycol, 17 g, Oral, TID PRN    simethicone, 1 tablet, Oral, QID PRN    sodium chloride 0.9%, 10 mL, Intravenous, PRN    OBJECTIVE:   Vital Signs:  VITAL SIGNS: 24 HR MIN & MAX LAST   Temp  Min: 97.4 °F (36.3 °C)  Max: 98 °F (36.7 °C)  97.9 °F (36.6 °C)   BP  Min: 121/79  Max: 167/83  134/64    Pulse  Min: 51  Max: 67  (!) 54    Resp  Min: 16  Max: 20  19    SpO2  Min: 96 %  Max: 98 %  96 %      HT: 6' 1" (185.4 cm)  WT: 70.1 kg (154 lb 8.7 oz)  " "BMI: 20.4     Intake/output:  Intake/Output - Last 3 Shifts         04/08 0700 04/09 0659 04/09 0700  04/10 0659 04/10 0700 04/11 0659    P.O.  890     I.V. (mL/kg)   1190.5 (17)    IV Piggyback       Total Intake(mL/kg)  890 (12.7) 1190.5 (17)    Urine (mL/kg/hr)  1475 (0.9) 450 (1.2)    Total Output  1475 450    Net  -585 +740.5                   Intake/Output Summary (Last 24 hours) at 4/10/2025 1215  Last data filed at 4/10/2025 0756  Gross per 24 hour   Intake 2080.53 ml   Output 1925 ml   Net 155.53 ml         Physical Exam:  General: Well developed, well nourished, no acute distress  HEENT: Normocephalic, PERRL  CV: RR  Resp: NWOB  GI:  Abdomen soft, non-tender, non-distended, no guarding, no rebound  :  Deferred  MSK: BLE cold to the touch without palpable pedal, posterior tibial, or popliteal pulses. Extremely tender to palpation and with excoriations, skin crusting. No obvious ulcerations or other deformities  Skin/wounds:  No rashes, ulcers, erythema  Neuro:  CNII-XII grossly intact, alert and oriented to person, place, and time    Labs:  Troponin:  No results for input(s): "TROPONINI" in the last 72 hours.  CBC:  Recent Labs     04/09/25  0523 04/10/25  0621   WBC 12.89* 8.84   RBC 5.19 4.87   HGB 13.9* 13.1*   HCT 41.9* 40.5*    213   MCV 80.7 83.2   MCH 26.8* 26.9*   MCHC 33.2 32.3*     CMP:  Recent Labs     04/09/25  0523 04/10/25  0621   CALCIUM 9.2 8.6*   ALBUMIN 4.0 3.6   * 129*   K 4.3 3.7   CO2 18* 22*   CL 96* 94*   BUN 20.1 26.3*   CREATININE 1.19 1.18   ALKPHOS 51 47   ALT 6 9   AST 29 39   BILITOT 0.8 0.6     Lactic Acid:  No results for input(s): "LACTATE" in the last 72 hours.  ETOH:  No results for input(s): "ETHANOL" in the last 72 hours.   Urine Drug Screen:  No results for input(s): "COCAINE", "OPIATE", "BARBITURATE", "AMPHETAMINE", "FENTANYL", "CANNABINOIDS", "MDMA" in the last 72 hours.    Invalid input(s): "BENZODIAZEPINE", "PHENCYCLIDINE"   ABG:  No results for " "input(s): "PH", "PO2", "PCO2", "HCO3", "BE" in the last 168 hours.   I have reviewed all pertinent lab results within the past 24 hours.    Diagnostic Results:  Imaging Results              CTA Runoff ABD PEL Bilat Lower Ext (Final result)  Result time 04/07/25 11:34:41      Final result by Sarthak Holland MD (04/07/25 11:34:41)                   Impression:      1. Interval progression in the focal areas of decreased enhancement with associated mild perinephric fat stranding in the lower pole of the left kidney. This may represent a non expansile neoplastic process, pyelonephritis is not entirely excluded. Correlate with clinical and laboratory findings as regards further evaluation and follow-up.  2. Peripherally thrombus in the distal thoracic aorta through to the distal abdominal aorta/bifurcation with associated mild to moderate aortic stenosis.  3. 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  Moderate stenosis of the origin of the celiac artery is also noted.  Correlate for enteric hypoperfusion symptoms.  4. Total occlusion of the right common iliac, bilateral internal iliac, and right external iliac arteries.  5. Total occlusion of the left common femoral artery on image 203 series 15 without enhancement in the left superficial femoral, popliteal and trifurcation vessels. The delayed phase images show no flow in the left popliteal artery with possibly some minimal patchy reconstituted flow in scattered short segments of the trifurcation vessels with posterior tibial artery flow at the ankle. Correlate clinically as regards additional evaluation and follow-up possibly with digital subtraction angiography if clinically indicated.  6. Early phase imaging does not demonstrate flow in the right lower extremity arteries. The delayed phase images (series 37) demonstrates some reconstituted flow in the right " distal superficial femoral artery and popliteal artery with pronounced calcification of the trifurcation vessels but with intermittent identification of contrast in the proximal trifurcation vessels with some contrast in the peroneal artery at the ankle with some reconstituted anterior and posterior tibial flow at the ankle as well. Consider follow-up with digital subtraction angiography if clinically indicated.  7. Details and other findings as discussed above.  The findings are concordant with the [facility] report.      Electronically signed by: Sarthak Holland  Date:    04/07/2025  Time:    11:34               Narrative:    EXAMINATION:  CTA RUNOFF ABD PEL BILAT LOWER EXT    CLINICAL HISTORY:  Arterial embolism, lower extremity;Claudication or leg ischemia;    TECHNIQUE:  Arterial phase axial CT images were obtained through the abdomen and pelvis following IV administration of IV contrast. Coronal and sagittal reconstructions were performed. 3D reconstructions were submitted and interpreted. Dose reduction techniques including automatic exposure control (AEC) were utilized.  Dose (DLP): 1834 mGycm    COMPARISON:  08/19/2024 CT abdomen and pelvis    FINDINGS:  Thorax:    Lungs: There is mild nonspecific dependent change at the lung bases. No focal infiltrate or consolidation is seen.    Pleura: No effusions or thickening.    Heart: The heart size is within normal limits. Moderate coronary artery calcification is seen.    Abdomen:    Abdominal Wall: No abdominal wall pathology is seen.    Liver: The liver appears unremarkable.    Biliary System: No intrahepatic or extrahepatic biliary duct dilatation is seen.    Gallbladder: The gallbladder appears unremarkable.    Pancreas: Mild pancreatic atrophy is seen.    Spleen: The spleen appears unremarkable.    Adrenals: There is no significant interval change in the 2.4 cm low attenuation nodule at the medial limb of the right adrenal gland on image 21 series  4.    Kidneys: There is focal cortical thinning in the right kidney consistent with scarring. There is interval progression in the focal areas of decreased enhancement with associated mild perinephric fat stranding in the lower pole of the left kidney.    Bowel:    Esophagus: The visualized esophagus appears unremarkable.    Stomach: The stomach appears unremarkable.    Duodenum: Unremarkable appearing duodenum.    Small Bowel: The small bowel appears unremarkable.    Colon: Nondistended.    Appendix: The appendix is not identified but no inflammatory changes are seen in the right lower quadrant to suggest appendicitis.    Peritoneum: No intraperitoneal free air or ascites is seen.    Pelvis:    Bladder: Th bladder appears unremarkable.    Male:    Prostate gland: There are a few calcifications in the prostate gland.    Bony structures:    Dorsal Spine: There is moderate spondylosis of the visualized dorsal spine.    Bony Pelvis: There is mild degenerative change of the bilateral hips.    Hip: The visualized structures of the hip appear unremarkable.    Lower extremity bony structures: The bones of the left lower extremity appear intact. The bones of the right lower extremity appear intact.    Vascular Structures:    Aorta: There is moderate calcification of the abdominal aorta. There is peripherally located thrombus in the distal thoracic aorta extending inferiorly to the distal abdominal aorta/bifurcation with associated mild to moderate distal abdominal aortic stenosis.  There is total occlusion of the superior mesenteric artery on image 146 series 16 with reconstitution of flow seen in the distal branches through gastroduodenal collaterals with the celiac artery.  There is total occlusion of the inferior mesenteric artery on image 257 a with contrast opacifying the distal branches through collaterals.  Right iliac arteries: There is total occlusion of the right common iliac artery with no enhancement noted in  the right internal and external iliac arteries.    Left Common Iliac Artery: Left common iliac stent is seen in place and appears patent. There is total occlusion of the left internal iliac artery at its origin without enhancement in the distal segment.    Right Lower extremity arteries: The early phase imaging does not demonstrate flow in the right lower extremity arteries. The delayed phase images (series 37) demonstrates some reconstituted flow in the right distal superficial femoral artery and popliteal artery with pronounced calcification of the trifurcation vessels but with intermittent identification of contrast in the proximal trifurcation vessels with some contrast in the peroneal artery at the ankle with some reconstituted anterior and posterior tibial flow at the ankle as well.    Left Lower extremity arteries: There is total occlusion of the left common femoral artery on image 203 series 15 without enhancement in the left superficial femoral, popliteal and trifurcation vessels. The delayed phase images show no flow in the left popliteal artery with possibly some minimal patchy reconstituted flow in scattered short segments of the trifurcation vessels with posterior tibial artery flow at the ankle.                        Preliminary result by Rodrigo Chambers MD (04/06/25 23:48:33)                   Impression:    1. There is no significant interval change in the 2.4 cm low attenuation nodule at the medial limb of the right adrenal gland on image 21 series 4. Correlate with clinical and laboratory findings as regards further evaluation and follow-up.  2. There is interval progession in the focal areas of decreased enhancement with associated mild perinephric fat stranding in the lower pole of the left kidney. This may represent a non expansile neoplastic process, pyelonephritis is not entirely excluded. Correlate with clinical and laboratory findings as regards further evaluation and follow-up.  3. There  is peripherally located thrombus in the distal thoracic aorta through to the suprailiac aorta with associated mild to moderate luminal narrowing. There is total occlusion of the superior mesenteric artery on image 146 series 16, inferior mesenteric artery on image 257 as well as right common iliac artery. There is no enhancement noted in the right internal and external iliac arteries.  4. There is total occlusion of the left internal iliac artery at its origin without enhancement in the distal segment.  5. There is total occlusion of the left common femoral artery on image 203 series 15 without enhancement in the left superficial femoral, popliteal and trifurcation vessels. The delayed phase images show no flow in the left popliteal artery with possibly some minimal patchy reconstituted flow in scattered short segments of the trifurcation vessels with posterior tibial artery flow at the ankle. Correlate clinically as regards additional evaluation and follow-up possibly with digital subtraction angiography if clinically indicated.  6. The early phase imaging does not demonstrate flow in the right lower extremity arteries. The delayed phase images (series 37) demonstrates some reconstituted flow in the right distal superficial femoral artery and popliteal artery with pronounced calcification of the trifurcation vessels but with intermittent identification of contrast in the proximal trifurcation vessels with some contrast in the peroneal artery at the ankle with some reconstituted anterior and posterior tibial flow at the ankle as well. Consider follow-up with digital subtraction angiography if clinically indicated.  7. Details and other findings as discussed above.               Narrative:    START OF REPORT:  Technique: CT Angiogram abdomen and pelvis with and without contrast. Additionally a CTA Abdominal aorta with runoff was performed.    Comparison: Comparison is with study dated 2024-08-19 19:33:57.    Clinical  History: Arterialembolism,lowerextremity,Claudicationorlegischemia,leftlegpain.    Dosage Information: Automated Exposure Control was utilized 1833.99 mGy.cm.    Findings:  Lines and Tubes: None.  Thorax:  Lungs: There is mild nonspecific dependent change at the lung bases. No focal infiltrate or consolidation is seen.  Pleura: No effusions or thickening.  Heart: The heart size is within normal limits. Moderate coronary artery calcification is seen.  Abdomen:  Abdominal Wall: No abdominal wall pathology is seen.  Liver: The liver appears unremarkable.  Biliary System: No intrahepatic or extrahepatic biliary duct dilatation is seen.  Gallbladder: The gallbladder appears unremarkable.  Pancreas: Mild pancreatic atrophy is seen.  Spleen: The spleen appears unremarkable.  Adrenals: There is no significant interval change in the 2.4 cm low attenuation nodule at the medial limb of the right adrenal gland on image 21 series 4.  Kidneys: There is focal cortical thinning in the right kidney consistent with scarring. There is interval progession in the focal areas of decreased enhancement with associated mild perinephric fat stranding in the lower pole of the left kidney.  Bowel:  Esophagus: The visualized esophagus appears unremarkable.  Stomach: The stomach appears unremarkable.  Duodenum: Unremarkable appearing duodenum.  Small Bowel: The small bowel appears unremarkable.  Colon: Nondistended.  Appendix: The appendix is not identified but no inflammatory changes are seen in the right lower quadrant to suggest appendicitis.  Peritoneum: No intraperitoneal free air or ascites is seen.    Pelvis:  Bladder: Th bladder appears unremarkable.  Male:  Prostate gland: There are a few calcifications in the prostate gland.    Bony structures:  Dorsal Spine: There is moderate spondylosis of the visualized dorsal spine.  Bony Pelvis: There is mild degenerative change of the bilateral hips.  Hip: The visualized structures of the hip  appear unremarkable.  Lower extremity bony structures: The bones of the left lower extremity appear intact. The bones of the right lower extremity appear intact.    Vascular Structures:  Aorta: There is moderate calcification of the abdominal aorta. There is peripherally located thrombus in the distal thoracic aorta through to the suprailiac aorta with associated mild to moderate luminal narrowing. There is total occlusion of the superior mesenteric artery on image 146 series 16, inferior mesenteric artery on image 257 as well as right common iliac artery. There is no enhancement noted in the right internal and external iliac arteries.  Iliac Arteries: Moderate atheromatous calcification are seen in the bilateral iliac arteries and branches.  Left Common Iliac Artery: Stent is seen in place.  Left internal iliac artery: There is total occlusion of the left internal iliac artery at its origin without enhancement in the distal segment.  Right Lower extremity arteries: The early phase imaging does not demonstrate flow in the right lower extremity arteries. The delayed phase images (series 37) demonstrates some reconstituted flow in the right distal superficial femoral artery and popliteal artery with pronounced calcification of the trifurcation vessels but with intermittent identification of contrast in the proximal trifurcation vessels with some contrast in the peroneal artery at the ankle with some reconstituted anterior and posterior tibial flow at the ankle as well.  Left Lower extremity arteries: There is total occlusion of the left common femoral artery on image 203 series 15 without enhancement in the left superficial femoral, popliteal and trifurcation vessels. The delayed phase images show no flow in the left popliteal artery with possibly some minimal patchy reconstituted flow in scattered short segments of the trifurcation vessels with posterior tibial artery flow at the ankle.    Notifications: The results  were discussed with the emergency room physician (Dr Craven) prior to dictation at 2025-04-06 23:43:04 CDT.                                       I have reviewed all pertinent imaging results/findings within the past 24 hours.    ASSESSMENT & PLAN:    Mr. Whiting is a 69 year old male with extensive atherosclerotic and thromboembolic disease who is admitted for management of acute on chronic bilateral lower extremity pain. He is non-ambulatory at baseline and is requesting bilateral AKA for definitive pain control. Vascular surgery says there are no options for revascularization and they are agreeable to the above plan.    - Will consult palliative care for discussion with patient about overall goals of care  - Will plan for bilateral AKA with wound vac placement in OR tomorrow  - NPO at midnight   - Okay for regular diet until then  - Continue supportive care with adequate pain control and nausea/vomiting control    Adama Palafox MD, MPH  [employer] Otolaryngology PGY-1  Acute Care Surgery Team               [1]   Social History  Tobacco Use   Smoking Status Every Day    Current packs/day: 0.50    Types: Cigarettes   Smokeless Tobacco Never

## 2025-04-11 ENCOUNTER — ANESTHESIA (OUTPATIENT)
Dept: SURGERY | Facility: HOSPITAL | Age: 70
End: 2025-04-11
Payer: MEDICARE

## 2025-04-11 PROCEDURE — 63600175 PHARM REV CODE 636 W HCPCS: Performed by: STUDENT IN AN ORGANIZED HEALTH CARE EDUCATION/TRAINING PROGRAM

## 2025-04-11 PROCEDURE — 25000003 PHARM REV CODE 250: Performed by: NURSE ANESTHETIST, CERTIFIED REGISTERED

## 2025-04-11 PROCEDURE — P9047 ALBUMIN (HUMAN), 25%, 50ML: HCPCS | Performed by: NURSE ANESTHETIST, CERTIFIED REGISTERED

## 2025-04-11 PROCEDURE — 63600175 PHARM REV CODE 636 W HCPCS: Performed by: NURSE ANESTHETIST, CERTIFIED REGISTERED

## 2025-04-11 RX ORDER — FENTANYL CITRATE 50 UG/ML
INJECTION, SOLUTION INTRAMUSCULAR; INTRAVENOUS
Status: DISCONTINUED | OUTPATIENT
Start: 2025-04-11 | End: 2025-04-11

## 2025-04-11 RX ORDER — ALBUMIN HUMAN 250 G/1000ML
SOLUTION INTRAVENOUS
Status: DISCONTINUED | OUTPATIENT
Start: 2025-04-11 | End: 2025-04-11

## 2025-04-11 RX ORDER — ROCURONIUM BROMIDE 10 MG/ML
INJECTION, SOLUTION INTRAVENOUS
Status: DISCONTINUED | OUTPATIENT
Start: 2025-04-11 | End: 2025-04-11

## 2025-04-11 RX ORDER — DEXAMETHASONE SODIUM PHOSPHATE 4 MG/ML
INJECTION, SOLUTION INTRA-ARTICULAR; INTRALESIONAL; INTRAMUSCULAR; INTRAVENOUS; SOFT TISSUE
Status: DISCONTINUED | OUTPATIENT
Start: 2025-04-11 | End: 2025-04-11

## 2025-04-11 RX ORDER — MIDAZOLAM HYDROCHLORIDE 1 MG/ML
INJECTION INTRAMUSCULAR; INTRAVENOUS
Status: DISCONTINUED | OUTPATIENT
Start: 2025-04-11 | End: 2025-04-11

## 2025-04-11 RX ORDER — EPHEDRINE SULFATE 50 MG/ML
INJECTION, SOLUTION INTRAVENOUS
Status: DISCONTINUED | OUTPATIENT
Start: 2025-04-11 | End: 2025-04-11

## 2025-04-11 RX ORDER — ACETAMINOPHEN 10 MG/ML
INJECTION, SOLUTION INTRAVENOUS
Status: DISCONTINUED | OUTPATIENT
Start: 2025-04-11 | End: 2025-04-11

## 2025-04-11 RX ORDER — LIDOCAINE HYDROCHLORIDE 20 MG/ML
INJECTION, SOLUTION EPIDURAL; INFILTRATION; INTRACAUDAL; PERINEURAL
Status: DISCONTINUED | OUTPATIENT
Start: 2025-04-11 | End: 2025-04-11

## 2025-04-11 RX ORDER — VASOPRESSIN 20 [USP'U]/ML
INJECTION, SOLUTION INTRAMUSCULAR; SUBCUTANEOUS
Status: DISCONTINUED | OUTPATIENT
Start: 2025-04-11 | End: 2025-04-11

## 2025-04-11 RX ORDER — GLYCOPYRROLATE 0.2 MG/ML
INJECTION INTRAMUSCULAR; INTRAVENOUS
Status: DISCONTINUED | OUTPATIENT
Start: 2025-04-11 | End: 2025-04-11

## 2025-04-11 RX ORDER — ONDANSETRON HYDROCHLORIDE 2 MG/ML
INJECTION, SOLUTION INTRAVENOUS
Status: DISCONTINUED | OUTPATIENT
Start: 2025-04-11 | End: 2025-04-11

## 2025-04-11 RX ORDER — CALCIUM CHLORIDE INJECTION 100 MG/ML
INJECTION, SOLUTION INTRAVENOUS
Status: DISCONTINUED | OUTPATIENT
Start: 2025-04-11 | End: 2025-04-11

## 2025-04-11 RX ORDER — ETOMIDATE 2 MG/ML
INJECTION INTRAVENOUS
Status: DISCONTINUED | OUTPATIENT
Start: 2025-04-11 | End: 2025-04-11

## 2025-04-11 RX ADMIN — ETOMIDATE 12 MG: 2 INJECTION INTRAVENOUS at 09:04

## 2025-04-11 RX ADMIN — ROCURONIUM BROMIDE 50 MG: 10 SOLUTION INTRAVENOUS at 09:04

## 2025-04-11 RX ADMIN — MIDAZOLAM HYDROCHLORIDE 2 MG: 1 INJECTION, SOLUTION INTRAMUSCULAR; INTRAVENOUS at 09:04

## 2025-04-11 RX ADMIN — EPHEDRINE SULFATE 10 MG: 50 INJECTION INTRAVENOUS at 10:04

## 2025-04-11 RX ADMIN — SUGAMMADEX 200 MG: 100 INJECTION, SOLUTION INTRAVENOUS at 11:04

## 2025-04-11 RX ADMIN — LIDOCAINE HYDROCHLORIDE 4 ML: 20 INJECTION, SOLUTION EPIDURAL; INFILTRATION; INTRACAUDAL; PERINEURAL at 09:04

## 2025-04-11 RX ADMIN — ACETAMINOPHEN 1000 MG: 10 INJECTION, SOLUTION INTRAVENOUS at 10:04

## 2025-04-11 RX ADMIN — ALBUMIN (HUMAN) 100 ML: 12.5 SOLUTION INTRAVENOUS at 10:04

## 2025-04-11 RX ADMIN — FENTANYL CITRATE 50 MCG: 50 INJECTION, SOLUTION INTRAMUSCULAR; INTRAVENOUS at 09:04

## 2025-04-11 RX ADMIN — CALCIUM CHLORIDE INJECTION 0.5 G: 100 INJECTION, SOLUTION INTRAVENOUS at 10:04

## 2025-04-11 RX ADMIN — DEXAMETHASONE SODIUM PHOSPHATE 4 MG: 4 INJECTION, SOLUTION INTRA-ARTICULAR; INTRALESIONAL; INTRAMUSCULAR; INTRAVENOUS; SOFT TISSUE at 10:04

## 2025-04-11 RX ADMIN — CEFAZOLIN SODIUM 2 G: 2 SOLUTION INTRAVENOUS at 10:04

## 2025-04-11 RX ADMIN — GLYCOPYRROLATE 0.2 MG: 0.2 INJECTION INTRAMUSCULAR; INTRAVENOUS at 10:04

## 2025-04-11 RX ADMIN — ONDANSETRON 4 MG: 2 INJECTION INTRAMUSCULAR; INTRAVENOUS at 10:04

## 2025-04-11 RX ADMIN — FENTANYL CITRATE 50 MCG: 50 INJECTION, SOLUTION INTRAMUSCULAR; INTRAVENOUS at 10:04

## 2025-04-11 RX ADMIN — VASOPRESSIN 1 UNITS: 20 INJECTION INTRAVENOUS at 10:04

## 2025-04-11 RX ADMIN — SODIUM CHLORIDE, SODIUM GLUCONATE, SODIUM ACETATE, POTASSIUM CHLORIDE AND MAGNESIUM CHLORIDE: 526; 502; 368; 37; 30 INJECTION, SOLUTION INTRAVENOUS at 09:04

## 2025-04-11 NOTE — PHYSICIAN QUERY
Provider, due to conflicting documentation please clarify the hypertensive diagnosis.   Hypertensive urgency

## 2025-04-11 NOTE — OP NOTE
OPERATIVE REPORT    Patient Name: Prasanna Whiting  Date of Admission: 4/6/2025  YOB: 1955  Date of procedure: 04/11/2025    Attending Surgeon: Dr. Bogdan Goldman    Resident Surgeon(s): Adama Palafox, PGY-1    Pre-operative diagnosis:  1. Bilateral lower leg ischemia    Post-operative diagnosis:   1. Same    Procedure:  1. Bilateral above knee amputations    Anesthesia: General    Complications: None    Blood loss: Minimal; see anesthesia record    Indication:  Prasanna Whiting is a 69 year old male with PMH HTN, HLD, PVD, CAD, GERD, Tobacco use, and medical noncompliance who is admitted for management of bilateral lower extremity pain and ischemia. He has been immobile following spine surgery in 2019. Denies fever, chills. The pain causes him to be nauseous, sometimes vomiting. CTA imaging showed near total occlusion of bilateral lower extremity arterial supply d/t thromboembolism. Vascular surgery attempted to perform thrombectomy on 4/8 but this revealed extensive BLE atherosclerotic disease with acute and subacute thromboembolism. They reported no further options for revascularization and recommend elective bilateral AKA for symptom relief.     Findings: Bilateral above knee amputations left open with dressings in place    Procedure in detail:  The patient was brought to the operating theater and placed in a supine position.  General endotracheal anesthesia was induced.  The legs were prepped and draped in the usual sterile fashion.  Timeout was performed.  Perioperative antibiotics were administered. First silk ties were used to silas the circumferential area for incision bilateral lower extremities about 10cm above the knee joint. Once this was marked, a scalpel was used to cut through the epidermis and dermis circumferentially. The gigli saw was then used to cut through subcutaneous tissue, muscle, and bone through to the other side until the lower leg had been fully freed from superior structures.  After the specimen were disposed of, attention was turned to hemostasis. Bilateral vessels were identied and silk suture was utilized to tie these off and achieve hemostasis. Bovi cauterization was utilized for remaining bleeding from muscle fibers and skin edges. The femur was isolated bilaterally and periosteal elevation was achieved. Once symmetric areas of femur bilaterally were marked, the electric saw was used to cut off about 8-10cm portion of bone. After this was achieved, the wound was prepped for dressing coverage. Xeroform gauze was first placed over the open tissue followed by layers of gauze and then abdominal pads. A kerlex was wrapped around each leg and then ACE bandages were applied to each. The procedure concluded without issue and the patient returned to PACU extubated in stable condition.    PLAN:  - Wound care consult  - Will continue to monitor       Adama Palafox MD, MPH  Acute Care Surgery Team

## 2025-04-11 NOTE — ANESTHESIA PROCEDURE NOTES
Intubation    Date/Time: 4/11/2025 9:54 AM    Performed by: Thompson Vivar CRNA  Authorized by: Michael Rose MD    Intubation:     Induction:  Intravenous    Intubated:  Postinduction    Mask Ventilation:  Easy mask    Attempts:  1    Attempted By:  CRNA    Method of Intubation:  Video laryngoscopy    Blade:  Coleman 3    Laryngeal View Grade: Grade I - full view of cords      Difficult Airway Encountered?: No      Complications:  None    Airway Device:  Oral endotracheal tube    Airway Device Size:  7.5    Style/Cuff Inflation:  Cuffed (inflated to minimal occlusive pressure)    Tube secured:  21    Secured at:  The lips    Placement Verified By:  Capnometry    Complicating Factors:  None    Findings Post-Intubation:  BS equal bilateral and atraumatic/condition of teeth unchanged

## 2025-04-11 NOTE — TRANSFER OF CARE
"Anesthesia Transfer of Care Note    Patient: Prasanna Whiting    Procedure(s) Performed: Procedure(s) (LRB):  AMPUTATION, ABOVE KNEE (Bilateral)    Patient location: PACU    Anesthesia Type: general    Transport from OR: Transported from OR on room air with adequate spontaneous ventilation    Post pain: adequate analgesia    Post assessment: no apparent anesthetic complications and tolerated procedure well    Post vital signs: stable    Level of consciousness: sedated and responds to stimulation    Nausea/Vomiting: no nausea/vomiting    Complications: none    Transfer of care protocol was followed      Last vitals: Visit Vitals  BP (!) 155/63 (BP Location: Left arm, Patient Position: Lying)   Pulse (!) 41   Temp 36.5 °C (97.7 °F) (Skin)   Resp 18   Ht 6' 1" (1.854 m)   Wt 70.1 kg (154 lb 8.7 oz)   SpO2 100%   BMI 20.39 kg/m²     "

## 2025-04-11 NOTE — ANESTHESIA POSTPROCEDURE EVALUATION
Anesthesia Post Evaluation    Patient: Prasanna Whiting    Procedure(s) Performed: Procedure(s) (LRB):  AMPUTATION, ABOVE KNEE (Bilateral)    Final Anesthesia Type: general      Patient location during evaluation: PACU  Patient participation: Yes- Able to Participate  Level of consciousness: oriented and awake  Post-procedure vital signs: reviewed and stable  Pain management: adequate  Airway patency: patent    PONV status at discharge: No PONV  Anesthetic complications: no      Cardiovascular status: stable and hemodynamically stable  Respiratory status: spontaneous ventilation and unassisted  Hydration status: euvolemic  Follow-up not needed.  Comments: [facility]              Vitals Value Taken Time   /61 04/11/25 12:23   Temp 36.5 °C (97.7 °F) 04/11/25 11:20   Pulse 69 04/11/25 12:23   Resp 12 04/11/25 12:23   SpO2 97 % 04/11/25 12:23   Vitals shown include unfiled device data.      Event Time   Out of Recovery 04/11/2025 12:20:00         Pain/Daphney Score: Pain Rating Prior to Med Admin: 8 (4/11/2025 12:15 PM)  Pain Rating Post Med Admin: 3 (4/11/2025  3:40 AM)  Daphney Score: 10 (4/11/2025 12:20 PM)

## 2025-04-11 NOTE — PROGRESS NOTES
[facility] Medicine Progress Note        Chief Complaint: Inpatient Follow-up     HPI:   69-year-old male with no known medical condition as he states he has not seen a physician in 4 years. Prior to that he says he took a proximally 15 medications on a daily basis but has not taken anything for the last 4 years. Patient presents today with complaint of left lower extremity burning that started yesterday.      Vascular surgery evaluated.  Performed angiography.  He has bilateral lower extremity atherosclerotic disease along with acute and subacute thromboembolism.  That has no options for mean for revascularization.  The recommending likely amputation.  General surgery consulted.  Plan for bilateral AKA on 04/10     Interval Hx:  NPO this morning for surgery.  Pain is currently controlled.  No family with him.  Patient is pleasant and without any current concerns.  Asking for a cup of coffee but discussed with him again that he is NPO for his surgical procedure and not to eat or drink anything.  We will get him a diet once he comes back from surgery.     Objective/physical exam:  General: In no acute distress, afebrile  Chest: Clear to auscultation bilaterally  Heart: RRR, +S1, S2, no appreciable murmur  Abdomen: Soft, nontender, BS +  MSK:  Bilateral lower extremity atrophy, cool to touch, poor peripheral pulses Neurologic: Alert and oriented x4, Cranial nerve II-XII intact, Strength 5/5 in all 4 extremities    VITAL SIGNS: 24 HRS MIN & MAX LAST   Temp  Min: 97.4 °F (36.3 °C)  Max: 98.1 °F (36.7 °C) 97.4 °F (36.3 °C)   BP  Min: 134/64  Max: 181/84 (!) 157/75   Pulse  Min: 47  Max: 63  (!) 54   Resp  Min: 17  Max: 20 20   SpO2  Min: 92 %  Max: 97 % (!) 92 %       Recent Labs   Lab 04/09/25  0523 04/10/25  0621 04/11/25  0610   WBC 12.89* 8.84 11.41   RBC 5.19 4.87 4.75   HGB 13.9* 13.1* 12.9*   HCT 41.9* 40.5* 38.8*   MCV 80.7 83.2 81.7   MCH 26.8* 26.9* 27.2   MCHC 33.2 32.3*  33.2   RDW 15.8 15.7 15.6    213 213   MPV 10.2 10.8* 10.2       Recent Labs   Lab 04/07/25  0006 04/09/25  0523 04/10/25  0621 04/11/25  0610    132* 129* 132*   K 3.5 4.3 3.7 3.9    96* 94* 94*   CO2 20* 18* 22* 27   BUN 16.9 20.1 26.3* 23.8   CREATININE 1.31* 1.19 1.18 1.18   CALCIUM 8.2* 9.2 8.6* 8.7*   MG  --  2.20 2.30  --    ALBUMIN 2.7* 4.0 3.6  --    ALKPHOS 40 51 47  --    ALT 5 6 9  --    AST 11 29 39  --    BILITOT 0.4 0.8 0.6  --           Microbiology Results (last 7 days)       ** No results found for the last 168 hours. **             Radiology:  Echo    Left Ventricle: The left ventricle is normal in size. There is   concentric remodeling. Moderate global hypokinesis present. Septal motion   is abnormal is consistent with bundle branch block. There is moderately   reduced systolic function with a visually estimated ejection fraction of   30 - 40%. Grade I diastolic dysfunction.    Right Ventricle: The right ventricle is normal in size. Systolic   function is normal.    Left Atrium: Mildly dilated    Aortic Valve: Aortic valve peak velocity is 1.1 m/s. Mean gradient is 3   mmHg.  CTA Runoff ABD PEL Bilat Lower Ext  Narrative: EXAMINATION:  CTA RUNOFF ABD PEL BILAT LOWER EXT    CLINICAL HISTORY:  Arterial embolism, lower extremity;Claudication or leg ischemia;    TECHNIQUE:  Arterial phase axial CT images were obtained through the abdomen and pelvis following IV administration of IV contrast. Coronal and sagittal reconstructions were performed. 3D reconstructions were submitted and interpreted. Dose reduction techniques including automatic exposure control (AEC) were utilized.  Dose (DLP): 1834 mGycm    COMPARISON:  08/19/2024 CT abdomen and pelvis    FINDINGS:  Thorax:    Lungs: There is mild nonspecific dependent change at the lung bases. No focal infiltrate or consolidation is seen.    Pleura: No effusions or thickening.    Heart: The heart size is within normal limits. Moderate  coronary artery calcification is seen.    Abdomen:    Abdominal Wall: No abdominal wall pathology is seen.    Liver: The liver appears unremarkable.    Biliary System: No intrahepatic or extrahepatic biliary duct dilatation is seen.    Gallbladder: The gallbladder appears unremarkable.    Pancreas: Mild pancreatic atrophy is seen.    Spleen: The spleen appears unremarkable.    Adrenals: There is no significant interval change in the 2.4 cm low attenuation nodule at the medial limb of the right adrenal gland on image 21 series 4.    Kidneys: There is focal cortical thinning in the right kidney consistent with scarring. There is interval progression in the focal areas of decreased enhancement with associated mild perinephric fat stranding in the lower pole of the left kidney.    Bowel:    Esophagus: The visualized esophagus appears unremarkable.    Stomach: The stomach appears unremarkable.    Duodenum: Unremarkable appearing duodenum.    Small Bowel: The small bowel appears unremarkable.    Colon: Nondistended.    Appendix: The appendix is not identified but no inflammatory changes are seen in the right lower quadrant to suggest appendicitis.    Peritoneum: No intraperitoneal free air or ascites is seen.    Pelvis:    Bladder: Th bladder appears unremarkable.    Male:    Prostate gland: There are a few calcifications in the prostate gland.    Bony structures:    Dorsal Spine: There is moderate spondylosis of the visualized dorsal spine.    Bony Pelvis: There is mild degenerative change of the bilateral hips.    Hip: The visualized structures of the hip appear unremarkable.    Lower extremity bony structures: The bones of the left lower extremity appear intact. The bones of the right lower extremity appear intact.    Vascular Structures:    Aorta: There is moderate calcification of the abdominal aorta. There is peripherally located thrombus in the distal thoracic aorta extending inferiorly to the distal abdominal  aorta/bifurcation with associated mild to moderate distal abdominal aortic stenosis.  There is total occlusion of the superior mesenteric artery on image 146 series 16 with reconstitution of flow seen in the distal branches through gastroduodenal collaterals with the celiac artery.  There is total occlusion of the inferior mesenteric artery on image 257 a with contrast opacifying the distal branches through collaterals.  Right iliac arteries: There is total occlusion of the right common iliac artery with no enhancement noted in the right internal and external iliac arteries.    Left Common Iliac Artery: Left common iliac stent is seen in place and appears patent. There is total occlusion of the left internal iliac artery at its origin without enhancement in the distal segment.    Right Lower extremity arteries: The early phase imaging does not demonstrate flow in the right lower extremity arteries. The delayed phase images (series 37) demonstrates some reconstituted flow in the right distal superficial femoral artery and popliteal artery with pronounced calcification of the trifurcation vessels but with intermittent identification of contrast in the proximal trifurcation vessels with some contrast in the peroneal artery at the ankle with some reconstituted anterior and posterior tibial flow at the ankle as well.    Left Lower extremity arteries: There is total occlusion of the left common femoral artery on image 203 series 15 without enhancement in the left superficial femoral, popliteal and trifurcation vessels. The delayed phase images show no flow in the left popliteal artery with possibly some minimal patchy reconstituted flow in scattered short segments of the trifurcation vessels with posterior tibial artery flow at the ankle.  Impression: 1. Interval progression in the focal areas of decreased enhancement with associated mild perinephric fat stranding in the lower pole of the left kidney. This may represent a  non expansile neoplastic process, pyelonephritis is not entirely excluded. Correlate with clinical and laboratory findings as regards further evaluation and follow-up.  2. Peripherally thrombus in the distal thoracic aorta through to the distal abdominal aorta/bifurcation with associated mild to moderate aortic stenosis.  3. 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  Moderate stenosis of the origin of the celiac artery is also noted.  Correlate for enteric hypoperfusion symptoms.  4. Total occlusion of the right common iliac, bilateral internal iliac, and right external iliac arteries.  5. Total occlusion of the left common femoral artery on image 203 series 15 without enhancement in the left superficial femoral, popliteal and trifurcation vessels. The delayed phase images show no flow in the left popliteal artery with possibly some minimal patchy reconstituted flow in scattered short segments of the trifurcation vessels with posterior tibial artery flow at the ankle. Correlate clinically as regards additional evaluation and follow-up possibly with digital subtraction angiography if clinically indicated.  6. Early phase imaging does not demonstrate flow in the right lower extremity arteries. The delayed phase images (series 37) demonstrates some reconstituted flow in the right distal superficial femoral artery and popliteal artery with pronounced calcification of the trifurcation vessels but with intermittent identification of contrast in the proximal trifurcation vessels with some contrast in the peroneal artery at the ankle with some reconstituted anterior and posterior tibial flow at the ankle as well. Consider follow-up with digital subtraction angiography if clinically indicated.  7. Details and other findings as discussed above.  The findings are concordant with the [facility]  report.    Electronically signed by: Sarthak Holland  Date:    04/07/2025  Time:    11:34        Medications:  Scheduled Meds:   atorvastatin  80 mg Oral Daily    carvediloL  6.25 mg Oral BID    heparin (PORCINE)  80 Units/kg Intravenous Once    NIFEdipine  30 mg Oral Daily    sacubitriL-valsartan  1 tablet Oral BID     Continuous Infusions:  PRN Meds:.  Current Facility-Administered Medications:     acetaminophen, 650 mg, Oral, Q4H PRN    albuterol-ipratropium, 3 mL, Nebulization, Q6H PRN    ALPRAZolam, 0.5 mg, Oral, TID PRN    aluminum-magnesium hydroxide-simethicone, 30 mL, Oral, QID PRN    ammonium lactate, , Topical (Top), BID PRN    bisacodyL, 10 mg, Rectal, Daily PRN    dextrose 50%, 12.5 g, Intravenous, PRN    dextrose 50%, 12.5 g, Intravenous, PRN    dextrose 50%, 25 g, Intravenous, PRN    glucagon (human recombinant), 1 mg, Intramuscular, PRN    glucose, 16 g, Oral, PRN    glucose, 24 g, Oral, PRN    heparin (PORCINE), 60 Units/kg, Intravenous, PRN    heparin (PORCINE), 30 Units/kg, Intravenous, PRN    hydrALAZINE, 10 mg, Intravenous, Q4H PRN    HYDROmorphone, 2 mg, Intravenous, Q4H PRN    labetalol, 10 mg, Intravenous, QID PRN    melatonin, 9 mg, Oral, Nightly PRN    naloxone, 0.02 mg, Intravenous, PRN    nicotine, 1 patch, Transdermal, Daily PRN    ondansetron, 8 mg, Oral, Q8H PRN    ondansetron, 4 mg, Intravenous, Q8H PRN    oxyCODONE, 15 mg, Oral, Q4H PRN    polyethylene glycol, 17 g, Oral, TID PRN    simethicone, 1 tablet, Oral, QID PRN    sodium chloride 0.9%, 10 mL, Intravenous, PRN    Nutrition:  Nutrition consulted. Most recent weight and BMI monitored-     Measurements:  Wt Readings from Last 1 Encounters:   04/07/25 70.1 kg (154 lb 8.7 oz)   Body mass index is 20.39 kg/m².    Patient has been screened and assessed by RD.    Malnutrition Type:  Context:    Level:      Malnutrition Characteristic Summary:       Interventions/Recommendations (treatment strategy):           Assessment/Plan:   Left  internal iliac/common femoral occlusion   Left lower extremity ischemia   Distal thoracic aortic thrombus   SMA occlusion   NSTEMI, type 2 demand ischemia   Hypertensive urgency   Acute on chronic kidney disease, unknown baseline   Adrenal adenoma   Tobacco abuse      NPO for bilateral AK this morning with general surgery   Continue heparin drip   Leukocytosis resolved   Appreciate cardiology recommendations.  Needs outpatient stress test   Continue with p.r.n. analgesia     Critical care diagnosis:  Critical limb ischemia requiring heparin drip  Critical care interventions: hands on evaluation, review of labs/radiographs/records and discussions with family  Critical care time spent: >32 minutes    Afternoon update:   Patient taken to the OR for bilateral AKA.  Returned to the floor in stable condition.  No wound VAC at this time.  Will follow up further wound care recommendations from General surgery.  Continue heparin for now.  I discussed the case with case management as well.  Will likely require some type of placement upon discharge.  Suspect early next week      Yifan Cottrell MD   04/11/2025     All diagnosis and differential diagnosis have been reviewed; assessment and plan has been documented; I have personally reviewed the labs and test results that are presently available; I have reviewed the patients medication list; I have reviewed the consulting providers response and recommendations. I have reviewed or attempted to review medical records based upon their availability    All of the patient's questions have been  addressed and answered. Patient's is agreeable to the above stated plan. I will continue to monitor closely and make adjustments to medical management as needed.  _____________________________________________________________________

## 2025-04-11 NOTE — ANESTHESIA PREPROCEDURE EVALUATION
04/10/2025  Prasanna Whiting is a 69 y.o., male, who presents for the following:    Procedures:      AMPUTATION, ABOVE KNEE (Bilateral: Leg Upper) - With possible wound vac placement on bilateral stump      APPLICATION, WOUND VAC   Anesthesia type: General   Diagnosis: Arterial embolus of lower extremity [I74.3]   Pre-op diagnosis: Arterial embolus of lower extremity [I74.3]   Location: [facility] OR  / [facility] OR   Surgeons: Bogdan Goldman MD     Heparin Infusion, stopped @ 0230 am    HPI:   69 y.o., male hx of hepatitis and HTN who presents to the ED as a transfer from [place] for vascular surgery. Pt states beginning yesterday he began having pain to his left lower extremity and noticed it was cold to the touch so he went to [place] ED. Ultrasound of the left lower extremity done at [place] showing arterial occlusion. Pt. had a unsuccessful bilateral lower extremity embolectomy 4/07, under [place]. Patient has extensive BLE thromboembolism along with severe PAD. Complete embolectomy is not possible in light of his severe PAD and bypass is not possible secondary to no target vessels. He will benefit from Surgery evaluation for B AKA especially in light of his chronic nonambulatory status.     ASSESSMENT:  NSTEMI possibly type II  -Troponin max 0.526, now trending down  Hypertensive emergency  WCT  CMO, unknown etiology at this point  -EF 35-40%  Distal thoracic aortic thrombus  L internal iliac and common femoral occlusion  Bilateral severe PAD  AROLDO on CKD  CAD  HTN  HLD  Adrenal adenoma  Tobacco use  Medical noncompliance    Echo 4.7.25  Left Ventricle: The left ventricle is normal in size. There is concentric remodeling. Moderate global hypokinesis present. Septal motion is abnormal is consistent with bundle branch block. There is moderately reduced systolic function with a visually estimated ejection fraction of 35 -  40%. Grade I diastolic dysfunction.  Right Ventricle: The right ventricle is normal in size. Systolic function is normal.  Left Atrium: Mildly dilated  Aortic Valve: Aortic valve peak velocity is 1.1 m/s. Mean gradient is 3 mmHg.    Past Medical History:   Diagnosis Date    Contusion of cervical cord     Hepatitis     Hypertension     Mental disorder      LAB:        EKG:  Sinus rhythm with 1st degree A-V block with Premature atrial complexes with   Aberrant conduction   Left axis deviation   Right bundle branch block   LVH with repolarization abnormality ( R in aVL , Romhilt-Sanchez )   Inferior infarct (cited on or before 06-Apr-2025)   Anterolateral infarct (cited on or before 06-Apr-2025)   Abnormal ECG   Confirmed by Anjum Ennis ([id number]) on 4/7/2025 6:36:37 PM     Pre-op Assessment    I have reviewed the Patient Summary Reports.     I have reviewed the Nursing Notes. I have reviewed the NPO Status.   I have reviewed the Medications.     Review of Systems  Anesthesia Hx:  No problems with previous Anesthesia             Denies Family Hx of Anesthesia complications.    Denies Personal Hx of Anesthesia complications.                    Social:  Smoker       Cardiovascular:                    NSTEMI, type 2, demand ischemia  CMO  [ EF 35-40%]                           Hepatic/GI:       Hepatitis Denies abdominal pain/nausea this a.m.             Neurological:           Cervical Spinal Stenosis, s/p ACDF                                Physical Exam  General: Alert and Oriented    Airway:  Mallampati: I   Mouth Opening: Normal  TM Distance: Normal  Tongue: Normal  Neck ROM: Normal ROM    Dental:  Edentulous    Chest/Lungs:  Normal Respiratory Rate    Heart:  Rate: Normal  Rhythm: Regular Rhythm        Anesthesia Plan  Type of Anesthesia, risks & benefits discussed:    Anesthesia Type: Gen ETT  Intra-op Monitoring Plan: Standard ASA Monitors  Post Op Pain Control Plan: IV/PO Opioids PRN and multimodal  analgesia  Induction:  IV  Airway Plan: Direct  Informed Consent: Informed consent signed with the Patient and all parties understand the risks and agree with anesthesia plan.  All questions answered. Patient consented to blood products? No  ASA Score: 3  Day of Surgery Review of History & Physical: H&P Update referred to the surgeon/provider.  Anesthesia Plan Notes: Premedication with Midazolam  Technique: GETA ( Embolectomy Sx [4/7] required Neosyn / Vasopressin support]  PONV Prophylaxis   T&S  PACU Postop       Ready For Surgery From Anesthesia Perspective.     .

## 2025-04-12 PROBLEM — E44.0 MODERATE MALNUTRITION: Status: ACTIVE | Noted: 2025-01-01

## 2025-04-12 NOTE — PLAN OF CARE
Problem: Adult Inpatient Plan of Care  Goal: Plan of Care Review  Outcome: Progressing  Goal: Patient-Specific Goal (Individualized)  Outcome: Progressing  Goal: Absence of Hospital-Acquired Illness or Injury  Outcome: Progressing  Goal: Optimal Comfort and Wellbeing  Outcome: Progressing     Problem: Wound  Goal: Optimal Coping  Outcome: Progressing  Intervention: Support Patient and Family Response  Flowsheets (Taken 4/12/2025 3940)  Supportive Measures:   active listening utilized   decision-making supported  Family/Support System Care:   support provided   self-care encouraged

## 2025-04-12 NOTE — PROGRESS NOTES
Inpatient Nutrition Assessment    Admit Date: 4/6/2025   Total duration of encounter: 6 days   Patient Age: 69 y.o.    Nutrition Recommendation/Prescription     Continue current diet (Diet Adult Regular) as tolerated.   Add Boost Plus (provides 360 kcal, 14 g protein per serving) TID  Add Elijah (provides 90 kcal, 2.5 g protein per serving) BID    Communication of Recommendations: reviewed with patient    Nutrition Assessment     Malnutrition Assessment/Nutrition-Focused Physical Exam    Malnutrition Context: chronic illness (04/12/25 [facility])  Malnutrition Level: moderate (04/12/25 1358)  Energy Intake (Malnutrition): other (see comments) (Does not meet criteria) (04/12/25 1358)  Weight Loss (Malnutrition): other (see comments) (Does not meet criteria) (04/12/25 1358)  Subcutaneous Fat (Malnutrition): mild depletion (04/12/25 1358)           Muscle Mass (Malnutrition): mild depletion (04/12/25 1358)  [place] Region (Muscle Loss): mild depletion  Clavicle Bone Region (Muscle Loss): mild depletion                    Fluid Accumulation (Malnutrition): other (see comments) (Not present) (04/12/25 1358)     Hand  Strength, Right (Malnutrition): Unable to assess (04/12/25 1358)  A minimum of two characteristics is recommended for diagnosis of either severe or non-severe malnutrition.    Chart Review    Reason Seen: length of stay    Malnutrition Screening Tool Results   Have you recently lost weight without trying?: No  Have you been eating poorly because of a decreased appetite?: No   MST Score: 0   Diagnosis:  Left internal iliac/common femoral occlusion   Left lower extremity ischemia s/p B AKA 4/11  Distal thoracic aortic thrombus   SMA occlusion   NSTEMI, type 2 demand ischemia   Hypertensive urgency   Acute on chronic kidney disease, unknown baseline   Adrenal adenoma   Tobacco abuse     Relevant Medical History:   N/A    Scheduled Medications:  atorvastatin, 80 mg, Daily  carvediloL, 6.25 mg, BID  gabapentin, 300  mg, TID  heparin (PORCINE), 80 Units/kg, Once  methocarbamoL, 500 mg, QID  mupirocin, , BID  NIFEdipine, 30 mg, Daily  sacubitriL-valsartan, 1 tablet, BID    Continuous Infusions:   PRN Medications:  acetaminophen, 650 mg, Q4H PRN  albuterol-ipratropium, 3 mL, Q6H PRN  ALPRAZolam, 0.5 mg, TID PRN  aluminum-magnesium hydroxide-simethicone, 30 mL, QID PRN  ammonium lactate, , BID PRN  bisacodyL, 10 mg, Daily PRN  dextrose 50%, 12.5 g, PRN  dextrose 50%, 12.5 g, PRN  dextrose 50%, 25 g, PRN  glucagon (human recombinant), 1 mg, PRN  glucose, 16 g, PRN  glucose, 24 g, PRN  heparin (PORCINE), 60 Units/kg, PRN  heparin (PORCINE), 30 Units/kg, PRN  hydrALAZINE, 10 mg, Q4H PRN  HYDROmorphone, 2 mg, Q4H PRN  labetalol, 10 mg, QID PRN  melatonin, 9 mg, Nightly PRN  naloxone, 0.02 mg, PRN  nicotine, 1 patch, Daily PRN  ondansetron, 8 mg, Q8H PRN  ondansetron, 4 mg, Q8H PRN  oxyCODONE, 30 mg, Q4H PRN  polyethylene glycol, 17 g, TID PRN  simethicone, 1 tablet, QID PRN  sodium chloride 0.9%, 10 mL, PRN    Calorie Containing IV Medications: no significant kcals from medications at this time    Recent Labs   Lab 04/06/25  1849 04/06/25  2158 04/07/25  0006 04/09/25  0523 04/10/25  0621 04/11/25  0610 04/12/25  0857   *  --  136 132* 129* 132* 128*   K 3.5  --  3.5 4.3 3.7 3.9 4.2   CALCIUM 8.7*  --  8.2* 9.2 8.6* 8.7* 9.0   MG  --   --   --  2.20 2.30  --   --      --  105 96* 94* 94* 95*   CO2 21*  --  20* 18* 22* 27 20*   BUN 21.0  --  16.9 20.1 26.3* 23.8 21.8   CREATININE 1.82*  --  1.31* 1.19 1.18 1.18 1.15   EGFRNORACEVR 40  --  59 >60 >60 >60 >60   GLUCOSE 122*  --  107 107 96 100 116*   BILITOT 0.4  --  0.4 0.8 0.6  --   --    ALKPHOS 48  --  40 51 47  --   --    ALT 8  --  5 6 9  --   --    AST 12  --  11 29 39  --   --    ALBUMIN 3.0*  --  2.7* 4.0 3.6  --   --    WBC 12.60* 13.74*  --  12.89* 8.84 11.41 14.45*   HGB 14.5 14.2  --  13.9* 13.1* 12.9* 11.5*   HCT 43.0 41.8*  --  41.9* 40.5* 38.8* 35.5*  "    Nutrition Orders:  Diet Adult Regular      Appetite/Oral Intake: poor/25-50% of meals  Factors Affecting Nutritional Intake: decreased appetite  Social Needs Impacting Access to Food: none identified  Food/Buddhist/Cultural Preferences: none reported  Food Allergies: no known food allergies  Last Bowel Movement: 04/05/25  Wound(s):  multiple leg incisions noted.     Comments    4/12/25: Pt reports lack of appetite for the past 3 days d/t not feeling well. Pt unable to provide much more subjective information. Pt is POD1 from B/L AKAs. Some mild fat loss and muscle wasting observed. Pt agreeable to chocolate flavored protein shakes with meals. EMR shows wt of 180 lbs on 8/19/24. Most recent wt, prior to B/L AKAs, is 154 lbs which would indicate a 26 lb wt loss (-14% body weight) within 1 year, noted but not nutritionally significant.     Anthropometrics    Height: 6' 1" (185.4 cm),    Last Weight: 70.1 kg (154 lb 8.7 oz) (04/07/25 1101), Weight Method: Bed Scale  BMI (Calculated): 20.4  BMI Classification: normal (BMI 18.5-24.9)        Ideal Body Weight (IBW), Male: 184 lb     % Ideal Body Weight, Male (lb): 86.96 %                          Usual Weight Provided By: EMR weight history    Wt Readings from Last 5 Encounters:   04/07/25 70.1 kg (154 lb 8.7 oz)   04/06/25 74.8 kg (165 lb)   08/19/24 81.6 kg (180 lb)   12/14/20 88.9 kg (195 lb 15.8 oz)   12/02/19 81.6 kg (180 lb)     Weight Change(s) Since Admission:   Wt Readings from Last 1 Encounters:   04/07/25 1101 70.1 kg (154 lb 8.7 oz)   04/06/25 2127 72.6 kg (160 lb)   Admit Weight: 72.6 kg (160 lb) (04/06/25 2127), Weight Method: Bed Scale    Estimated Needs    Weight Used For Calorie Calculations: 70.1 kg (154 lb 8.7 oz)  Energy Calorie Requirements (kcal): 2286 kcals (1.15 SFxMSJ)  Energy Need Method: Hyde-St Jeor  Weight Used For Protein Calculations: 70.1 kg (154 lb 8.7 oz)  Protein Requirements:  g (1.3-1.5 g/kg)  Fluid Requirements (mL): 1752 " mL (25 mL/kg) or per MD        Enteral Nutrition     Patient not receiving enteral nutrition at this time.    Parenteral Nutrition     Patient not receiving parenteral nutrition support at this time.    Evaluation of Received Nutrient Intake    Calories: not meeting estimated needs  Protein: not meeting estimated needs    Patient Education     Not applicable.    Nutrition Diagnosis     PES: Inadequate energy intake related to inability to consume sufficient nutrients as evidenced by lack of appetite x3 days. (new)     PES: Moderate chronic disease or condition related malnutrition Related to chronic illness As Evidenced by:  - muscle mass depletion: 2 areas of mild muscle loss (Temporalis, Clavicle) - loss of subcutaneous fat: 1 area of mild fat loss (Buccal) active    Nutrition Interventions     Intervention(s): general/healthful diet, commercial beverage, and multivitamin/mineral supplement therapy  Intervention(s): Oral nutritional supplement    Goal: Meet greater than 80% of nutritional needs by follow-up. (new)  Goal: Consume % of oral supplements by follow-up. (new)    Nutrition Goals & Monitoring     Dietitian will monitor: energy intake, weight, glucose/endocrine profile, and gastrointestinal profile  Discharge planning: continue Regular diet with Boost/Elijah oral supplements  Nutrition Risk/Follow-Up: moderate (follow-up in 3-5 days)   Please consult if re-assessment needed sooner.

## 2025-04-12 NOTE — PROGRESS NOTES
Acute Care Surgery   Progress Note  Admit Date: 4/6/2025  HD#6  POD#1 Day Post-Op    Subjective:   Interval history:  NAEO   AF   Hypertensive to SBP 180s  C/o pain to amputation sites     Home Meds:  Current Outpatient Medications   Medication Instructions    albuterol (PROVENTIL/VENTOLIN HFA) 90 mcg/actuation inhaler No dose, route, or frequency recorded.    ATROVENT HFA 17 mcg/actuation inhaler No dose, route, or frequency recorded.    baclofen (LIORESAL) 20 MG tablet No dose, route, or frequency recorded.    DULoxetine (CYMBALTA) 30 MG capsule No dose, route, or frequency recorded.    enalapril (VASOTEC) 20 MG tablet No dose, route, or frequency recorded.    finasteride (PROSCAR) 5 mg tablet No dose, route, or frequency recorded.    ibuprofen (ADVIL,MOTRIN) 100 mg/5 mL suspension No dose, route, or frequency recorded.    omeprazole (PRILOSEC) 40 MG capsule No dose, route, or frequency recorded.    predniSONE (DELTASONE) 20 MG tablet No dose, route, or frequency recorded.    rosuvastatin (CRESTOR) 20 MG tablet No dose, route, or frequency recorded.    VITAMIN D2 50,000 unit capsule No dose, route, or frequency recorded.      Scheduled Meds:   atorvastatin  80 mg Oral Daily    carvediloL  6.25 mg Oral BID    heparin (PORCINE)  80 Units/kg Intravenous Once    NIFEdipine  30 mg Oral Daily    sacubitriL-valsartan  1 tablet Oral BID     Continuous Infusions:  PRN Meds:  Current Facility-Administered Medications:     acetaminophen, 650 mg, Oral, Q4H PRN    albuterol-ipratropium, 3 mL, Nebulization, Q6H PRN    ALPRAZolam, 0.5 mg, Oral, TID PRN    aluminum-magnesium hydroxide-simethicone, 30 mL, Oral, QID PRN    ammonium lactate, , Topical (Top), BID PRN    bisacodyL, 10 mg, Rectal, Daily PRN    dextrose 50%, 12.5 g, Intravenous, PRN    dextrose 50%, 12.5 g, Intravenous, PRN    dextrose 50%, 25 g, Intravenous, PRN    glucagon (human recombinant), 1 mg, Intramuscular, PRN    glucose, 16 g, Oral, PRN    glucose, 24 g,  "Oral, PRN    heparin (PORCINE), 60 Units/kg, Intravenous, PRN    heparin (PORCINE), 30 Units/kg, Intravenous, PRN    hydrALAZINE, 10 mg, Intravenous, Q4H PRN    HYDROmorphone, 2 mg, Intravenous, Q4H PRN    labetalol, 10 mg, Intravenous, QID PRN    melatonin, 9 mg, Oral, Nightly PRN    naloxone, 0.02 mg, Intravenous, PRN    nicotine, 1 patch, Transdermal, Daily PRN    ondansetron, 8 mg, Oral, Q8H PRN    ondansetron, 4 mg, Intravenous, Q8H PRN    oxyCODONE, 30 mg, Oral, Q4H PRN    polyethylene glycol, 17 g, Oral, TID PRN    simethicone, 1 tablet, Oral, QID PRN    sodium chloride 0.9%, 10 mL, Intravenous, PRN     Objective:     VITAL SIGNS: 24 HR MIN & MAX LAST   Temp  Min: 97.4 °F (36.3 °C)  Max: 97.9 °F (36.6 °C)  97.9 °F (36.6 °C)   BP  Min: 117/68  Max: 194/85  (!) 186/84    Pulse  Min: 41  Max: 77  (!) 47    Resp  Min: 9  Max: 21  15    SpO2  Min: 93 %  Max: 100 %  97 %      HT: 6' 1" (185.4 cm)  WT: 70.1 kg (154 lb 8.7 oz)  BMI: 20.4     Intake/output:  Intake/Output - Last 3 Shifts         04/10 0700  04/11 0659 04/11 0700 04/12 0659 04/12 0700 04/13 0659    P.O.       I.V. (mL/kg) 1190.5 (17) 200 (2.9)     IV Piggyback  1050     Total Intake(mL/kg) 1190.5 (17) 1250 (17.8)     Urine (mL/kg/hr) 450 (0.3) 2150 (1.3)     Total Output 450 2150     Net +740.5 -900                    Intake/Output Summary (Last 24 hours) at 4/12/2025 0721  Last data filed at 4/12/2025 0619  Gross per 24 hour   Intake 1250 ml   Output 2150 ml   Net -900 ml         Lines/drains/airway:       Peripheral IV - Single Lumen 04/12/25 0553 20 G Distal;Posterior;Right Forearm (Active)   Site Assessment Clean;Dry;Intact;No redness;No swelling 04/12/25 0554   Extremity Assessment Distal to IV No abnormal discoloration;No redness;No swelling;No warmth 04/12/25 0554   Line Status Capped;Flushed;Saline locked 04/12/25 0554   Dressing Status Clean;Dry;Intact 04/12/25 0554   Dressing Intervention First dressing 04/12/25 0554   Number of days: 0     " "       Urethral Catheter 04/12/25 0622 Coude 16 Fr. (Active)   $ [place] Insertion Bedside Insertion Performed 04/12/25 0623   Site Assessment Clean;Intact;Dry 04/12/25 0623   Collection Container Standard drainage bag 04/12/25 0623   Securement Method secured to top of thigh w/ adhesive device 04/12/25 0623   Catheter Care Performed yes 04/12/25 0623   Reason for Continuing Urinary Catheterization Urinary retention 04/12/25 0623   Number of days: 0       Physical examination:  Gen: NAD, AAOx3, answering questions appropriately  HEENT: NC  CV: RR  Resp: NWOB  Abd: S/NT/ND   Ext: moving all extremities spontaneously and purposefully  Neuro: CN II-XII grossly intact  Skin/wounds: BLE ampuations sites with dressings in place, some blood on dressings to RLE    Labs:  Renal:  Recent Labs     04/10/25  0621 04/11/25  0610   BUN 26.3* 23.8   CREATININE 1.18 1.18     No results for input(s): "LACTIC" in the last 72 hours.  FENGI:  Recent Labs     04/10/25  0621 04/11/25  0610   * 132*   K 3.7 3.9   CL 94* 94*   CO2 22* 27   CALCIUM 8.6* 8.7*   MG 2.30  --    ALBUMIN 3.6  --    BILITOT 0.6  --    AST 39  --    ALKPHOS 47  --    ALT 9  --      Heme:  Recent Labs     04/10/25  0621 04/11/25  0610   HGB 13.1* 12.9*   HCT 40.5* 38.8*    213     ID:  Recent Labs     04/10/25  0621 04/11/25  0610   WBC 8.84 11.41     CBG:  Recent Labs     04/10/25  0621 04/11/25  0610   GLUCOSE 96 100      Cardiovascular:  Recent Labs   Lab 04/06/25  1849 04/07/25  0814 04/07/25  1131   TROPONINI 0.526* 0.372* 0.338*     ABG:  No results for input(s): "PH", "PO2", "PCO2", "HCO3", "BE" in the last 168 hours.   I have reviewed all pertinent lab results within the past 24 hours.    Imaging:  CTA Runoff ABD PEL Bilat Lower Ext   Final Result      1. Interval progression in the focal areas of decreased enhancement with associated mild perinephric fat stranding in the lower pole of the left kidney. This may represent a non expansile " neoplastic process, pyelonephritis is not entirely excluded. Correlate with clinical and laboratory findings as regards further evaluation and follow-up.   2. Peripherally thrombus in the distal thoracic aorta through to the distal abdominal aorta/bifurcation with associated mild to moderate aortic stenosis.   3. 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  Moderate stenosis of the origin of the celiac artery is also noted.  Correlate for enteric hypoperfusion symptoms.   4. Total occlusion of the right common iliac, bilateral internal iliac, and right external iliac arteries.   5. Total occlusion of the left common femoral artery on image 203 series 15 without enhancement in the left superficial femoral, popliteal and trifurcation vessels. The delayed phase images show no flow in the left popliteal artery with possibly some minimal patchy reconstituted flow in scattered short segments of the trifurcation vessels with posterior tibial artery flow at the ankle. Correlate clinically as regards additional evaluation and follow-up possibly with digital subtraction angiography if clinically indicated.   6. Early phase imaging does not demonstrate flow in the right lower extremity arteries. The delayed phase images (series 37) demonstrates some reconstituted flow in the right distal superficial femoral artery and popliteal artery with pronounced calcification of the trifurcation vessels but with intermittent identification of contrast in the proximal trifurcation vessels with some contrast in the peroneal artery at the ankle with some reconstituted anterior and posterior tibial flow at the ankle as well. Consider follow-up with digital subtraction angiography if clinically indicated.   7. Details and other findings as discussed above.   [facility].          Electronically signed by: Sarthak Holland   Date:    04/07/2025   Time:    11:34         I have reviewed all pertinent imaging results/findings within the past 24 hours.    Micro/Path/Other:  Microbiology Results (last 7 days)       ** No results found for the last 168 hours. **           Specimens (From admission, onward)       Start     Ordered    04/11/25 1104  Specimen to Pathology  RELEASE UPON ORDERING        References:    Click here for ordering Quick Tip   Question:  Release to patient  Answer:  Immediate    04/11/25 1104    04/08/25 0048  Specimen to Pathology Other  RELEASE UPON ORDERING        References:    Click here for ordering Quick Tip   Question:  Release to patient  Answer:  Immediate    04/08/25 0048                   Pathology Results  (Last 365 days)                 04/08/25 0047  Specimen to Pathology Other Final result             Assessment & Plan:   69M POD#1 s/p BL AKA     -Will plan for take back for formalization within next few days   -Multimodal pain control   -Maintain dressings       Vonnie Grullon MD  [employer] General Surgery PGY4

## 2025-04-12 NOTE — PROGRESS NOTES
[facility]  Hospital Medicine Progress Note        Chief Complaint: Inpatient Follow-up     HPI:   69-year-old male with no known medical condition as he states he has not seen a physician in 4 years. Prior to that he says he took a proximally 15 medications on a daily basis but has not taken anything for the last 4 years. Patient presents today with complaint of left lower extremity burning that started yesterday.      Vascular surgery evaluated.  Performed angiography.  He has bilateral lower extremity atherosclerotic disease along with acute and subacute thromboembolism.  That has no options for mean for revascularization.  The recommending likely amputation.  General surgery consulted.  Plan for bilateral AKA on 04/11.  Surgery went as expected.  Returned to the floor in stable condition on 4/11     Interval Hx:  Postop day 1.  Significant pain this morning though has not appear to be any acute distress.  Blood pressure is elevated related.  Nursing at the bedside as well.     Objective/physical exam:  General: In no acute distress, afebrile  Chest: Clear to auscultation bilaterally  Heart: RRR, +S1, S2, no appreciable murmur  Abdomen: Soft, nontender, BS +  MSK:  s/p B AKA  Neurologic: Alert and oriented x4, Cranial nerve II-XII intact, Strength 5/5 in all 4 extremities    VITAL SIGNS: 24 HRS MIN & MAX LAST   Temp  Min: 97.4 °F (36.3 °C)  Max: 97.9 °F (36.6 °C) 97.9 °F (36.6 °C)   BP  Min: 117/68  Max: 194/85 (!) 186/84   Pulse  Min: 41  Max: 77  (!) 47   Resp  Min: 9  Max: 21 15   SpO2  Min: 93 %  Max: 100 % 97 %       Recent Labs   Lab 04/09/25  0523 04/10/25  0621 04/11/25  0610   WBC 12.89* 8.84 11.41   RBC 5.19 4.87 4.75   HGB 13.9* 13.1* 12.9*   HCT 41.9* 40.5* 38.8*   MCV 80.7 83.2 81.7   MCH 26.8* 26.9* 27.2   MCHC 33.2 32.3* 33.2   RDW 15.8 15.7 15.6    213 213   MPV 10.2 10.8* 10.2       Recent Labs   Lab 04/07/25  0006 04/09/25  0523 04/10/25  0621 04/11/25  0610     132* 129* 132*   K 3.5 4.3 3.7 3.9    96* 94* 94*   CO2 20* 18* 22* 27   BUN 16.9 20.1 26.3* 23.8   CREATININE 1.31* 1.19 1.18 1.18   CALCIUM 8.2* 9.2 8.6* 8.7*   MG  --  2.20 2.30  --    ALBUMIN 2.7* 4.0 3.6  --    ALKPHOS 40 51 47  --    ALT 5 6 9  --    AST 11 29 39  --    BILITOT 0.4 0.8 0.6  --           Microbiology Results (last 7 days)       ** No results found for the last 168 hours. **             Radiology:  Echo    Left Ventricle: The left ventricle is normal in size. There is   concentric remodeling. Moderate global hypokinesis present. Septal motion   is abnormal is consistent with bundle branch block. There is moderately   reduced systolic function with a visually estimated ejection fraction of   30 - 40%. Grade I diastolic dysfunction.    Right Ventricle: The right ventricle is normal in size. Systolic   function is normal.    Left Atrium: Mildly dilated    Aortic Valve: Aortic valve peak velocity is 1.1 m/s. Mean gradient is 3   mmHg.  CTA Runoff ABD PEL Bilat Lower Ext  Narrative: EXAMINATION:  CTA RUNOFF ABD PEL BILAT LOWER EXT    CLINICAL HISTORY:  Arterial embolism, lower extremity;Claudication or leg ischemia;    TECHNIQUE:  Arterial phase axial CT images were obtained through the abdomen and pelvis following IV administration of IV contrast. Coronal and sagittal reconstructions were performed. 3D reconstructions were submitted and interpreted. Dose reduction techniques including automatic exposure control (AEC) were utilized.  Dose (DLP): 1834 mGycm    COMPARISON:  08/19/2024 CT abdomen and pelvis    FINDINGS:  Thorax:    Lungs: There is mild nonspecific dependent change at the lung bases. No focal infiltrate or consolidation is seen.    Pleura: No effusions or thickening.    Heart: The heart size is within normal limits. Moderate coronary artery calcification is seen.    Abdomen:    Abdominal Wall: No abdominal wall pathology is seen.    Liver: The liver appears  unremarkable.    Biliary System: No intrahepatic or extrahepatic biliary duct dilatation is seen.    Gallbladder: The gallbladder appears unremarkable.    Pancreas: Mild pancreatic atrophy is seen.    Spleen: The spleen appears unremarkable.    Adrenals: There is no significant interval change in the 2.4 cm low attenuation nodule at the medial limb of the right adrenal gland on image 21 series 4.    Kidneys: There is focal cortical thinning in the right kidney consistent with scarring. There is interval progression in the focal areas of decreased enhancement with associated mild perinephric fat stranding in the lower pole of the left kidney.    Bowel:    Esophagus: The visualized esophagus appears unremarkable.    Stomach: The stomach appears unremarkable.    Duodenum: Unremarkable appearing duodenum.    Small Bowel: The small bowel appears unremarkable.    Colon: Nondistended.    Appendix: The appendix is not identified but no inflammatory changes are seen in the right lower quadrant to suggest appendicitis.    Peritoneum: No intraperitoneal free air or ascites is seen.    Pelvis:    Bladder: Th bladder appears unremarkable.    Male:    Prostate gland: There are a few calcifications in the prostate gland.    Bony structures:    Dorsal Spine: There is moderate spondylosis of the visualized dorsal spine.    Bony Pelvis: There is mild degenerative change of the bilateral hips.    Hip: The visualized structures of the hip appear unremarkable.    Lower extremity bony structures: The bones of the left lower extremity appear intact. The bones of the right lower extremity appear intact.    Vascular Structures:    Aorta: There is moderate calcification of the abdominal aorta. There is peripherally located thrombus in the distal thoracic aorta extending inferiorly to the distal abdominal aorta/bifurcation with associated mild to moderate distal abdominal aortic stenosis.  There is total occlusion of the superior mesenteric  artery on image 146 series 16 with reconstitution of flow seen in the distal branches through gastroduodenal collaterals with the celiac artery.  There is total occlusion of the inferior mesenteric artery on image 257 a with contrast opacifying the distal branches through collaterals.  Right iliac arteries: There is total occlusion of the right common iliac artery with no enhancement noted in the right internal and external iliac arteries.    Left Common Iliac Artery: Left common iliac stent is seen in place and appears patent. There is total occlusion of the left internal iliac artery at its origin without enhancement in the distal segment.    Right Lower extremity arteries: The early phase imaging does not demonstrate flow in the right lower extremity arteries. The delayed phase images (series 37) demonstrates some reconstituted flow in the right distal superficial femoral artery and popliteal artery with pronounced calcification of the trifurcation vessels but with intermittent identification of contrast in the proximal trifurcation vessels with some contrast in the peroneal artery at the ankle with some reconstituted anterior and posterior tibial flow at the ankle as well.    Left Lower extremity arteries: There is total occlusion of the left common femoral artery on image 203 series 15 without enhancement in the left superficial femoral, popliteal and trifurcation vessels. The delayed phase images show no flow in the left popliteal artery with possibly some minimal patchy reconstituted flow in scattered short segments of the trifurcation vessels with posterior tibial artery flow at the ankle.  Impression: 1. Interval progression in the focal areas of decreased enhancement with associated mild perinephric fat stranding in the lower pole of the left kidney. This may represent a non expansile neoplastic process, pyelonephritis is not entirely excluded. Correlate with clinical and laboratory findings as regards  further evaluation and follow-up.  2. Peripherally thrombus in the distal thoracic aorta through to the distal abdominal aorta/bifurcation with associated mild to moderate aortic stenosis.  3. 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  Moderate stenosis of the origin of the celiac artery is also noted.  Correlate for enteric hypoperfusion symptoms.  4. Total occlusion of the right common iliac, bilateral internal iliac, and right external iliac arteries.  5. Total occlusion of the left common femoral artery on image 203 series 15 without enhancement in the left superficial femoral, popliteal and trifurcation vessels. The delayed phase images show no flow in the left popliteal artery with possibly some minimal patchy reconstituted flow in scattered short segments of the trifurcation vessels with posterior tibial artery flow at the ankle. Correlate clinically as regards additional evaluation and follow-up possibly with digital subtraction angiography if clinically indicated.  6. Early phase imaging does not demonstrate flow in the right lower extremity arteries. The delayed phase images (series 37) demonstrates some reconstituted flow in the right distal superficial femoral artery and popliteal artery with pronounced calcification of the trifurcation vessels but with intermittent identification of contrast in the proximal trifurcation vessels with some contrast in the peroneal artery at the ankle with some reconstituted anterior and posterior tibial flow at the ankle as well. Consider follow-up with digital subtraction angiography if clinically indicated.  7. Details and other findings as discussed above.  The findings are [facility].    Electronically signed by: Sarthak Holland  Date:    04/07/2025  Time:    11:34        Medications:  Scheduled Meds:   atorvastatin  80 mg Oral  Daily    carvediloL  6.25 mg Oral BID    heparin (PORCINE)  80 Units/kg Intravenous Once    NIFEdipine  30 mg Oral Daily    sacubitriL-valsartan  1 tablet Oral BID     Continuous Infusions:  PRN Meds:.  Current Facility-Administered Medications:     acetaminophen, 650 mg, Oral, Q4H PRN    albuterol-ipratropium, 3 mL, Nebulization, Q6H PRN    ALPRAZolam, 0.5 mg, Oral, TID PRN    aluminum-magnesium hydroxide-simethicone, 30 mL, Oral, QID PRN    ammonium lactate, , Topical (Top), BID PRN    bisacodyL, 10 mg, Rectal, Daily PRN    dextrose 50%, 12.5 g, Intravenous, PRN    dextrose 50%, 12.5 g, Intravenous, PRN    dextrose 50%, 25 g, Intravenous, PRN    glucagon (human recombinant), 1 mg, Intramuscular, PRN    glucose, 16 g, Oral, PRN    glucose, 24 g, Oral, PRN    heparin (PORCINE), 60 Units/kg, Intravenous, PRN    heparin (PORCINE), 30 Units/kg, Intravenous, PRN    hydrALAZINE, 10 mg, Intravenous, Q4H PRN    HYDROmorphone, 2 mg, Intravenous, Q4H PRN    labetalol, 10 mg, Intravenous, QID PRN    melatonin, 9 mg, Oral, Nightly PRN    naloxone, 0.02 mg, Intravenous, PRN    nicotine, 1 patch, Transdermal, Daily PRN    ondansetron, 8 mg, Oral, Q8H PRN    ondansetron, 4 mg, Intravenous, Q8H PRN    oxyCODONE, 15 mg, Oral, Q4H PRN    polyethylene glycol, 17 g, Oral, TID PRN    simethicone, 1 tablet, Oral, QID PRN    sodium chloride 0.9%, 10 mL, Intravenous, PRN    Nutrition:  Nutrition consulted. Most recent weight and BMI monitored-     Measurements:  Wt Readings from Last 1 Encounters:   04/07/25 70.1 kg (154 lb 8.7 oz)   Body mass index is 20.39 kg/m².    Patient has been screened and assessed by RD.    Malnutrition Type:  Context:    Level:      Malnutrition Characteristic Summary:       Interventions/Recommendations (treatment strategy):           Assessment/Plan:   Left internal iliac/common femoral occlusion   Left lower extremity ischemia s/p B AKA 4/11  Distal thoracic aortic thrombus   SMA occlusion   NSTEMI, type 2  demand ischemia   Hypertensive urgency   Acute on chronic kidney disease, unknown baseline   Adrenal adenoma   Tobacco abuse     Follow up labs this morning.  Need to check on his hemoglobin levels.    Significantly hypertensive but likely somewhat pain induced.  Will try to get his pain better controlled and then address his blood pressure as needed.  Continue with nifedipine and Coreg for now.  Also on Entresto.  Heparin drip has now been discontinued.  Will place on Lovenox when okay with surgery.    Needs to follow up with Cardiology as an outpatient for stress test.    Suspect that he will require some type of placement secondary to bilateral AKA    Critical care diagnosis: hypertensive urgency requiring iv antihypertensives  Critical care interventions: hands on evaluation, review of labs/radiographs/records and discussions with family  Critical care time spent: >32 minutes        Yifan Cottrell MD   04/12/2025     All diagnosis and differential diagnosis have been reviewed; assessment and plan has been documented; I have personally reviewed the labs and test results that are presently available; I have reviewed the patients medication list; I have reviewed the consulting providers response and recommendations. I have reviewed or attempted to review medical records based upon their availability    All of the patient's questions have been  addressed and answered. Patient's is agreeable to the above stated plan. I will continue to monitor closely and make adjustments to medical management as needed.  _____________________________________________________________________

## 2025-04-13 NOTE — PROGRESS NOTES
Acute Care Surgery   Progress Note  Admit Date: 4/6/2025  HD#7  POD#2 Days Post-Op    Subjective:   Interval history:  NAEO   AF   Hypertensive to SBP 180s  C/o pain to amputation sites   Increasing leukocytosis 16 from 14 from normal     Home Meds:  Current Outpatient Medications   Medication Instructions    albuterol (PROVENTIL/VENTOLIN HFA) 90 mcg/actuation inhaler No dose, route, or frequency recorded.    ATROVENT HFA 17 mcg/actuation inhaler No dose, route, or frequency recorded.    baclofen (LIORESAL) 20 MG tablet No dose, route, or frequency recorded.    DULoxetine (CYMBALTA) 30 MG capsule No dose, route, or frequency recorded.    enalapril (VASOTEC) 20 MG tablet No dose, route, or frequency recorded.    finasteride (PROSCAR) 5 mg tablet No dose, route, or frequency recorded.    ibuprofen (ADVIL,MOTRIN) 100 mg/5 mL suspension No dose, route, or frequency recorded.    omeprazole (PRILOSEC) 40 MG capsule No dose, route, or frequency recorded.    predniSONE (DELTASONE) 20 MG tablet No dose, route, or frequency recorded.    rosuvastatin (CRESTOR) 20 MG tablet No dose, route, or frequency recorded.    VITAMIN D2 50,000 unit capsule No dose, route, or frequency recorded.      Scheduled Meds:   atorvastatin  80 mg Oral Daily    carvediloL  6.25 mg Oral BID    enoxparin  40 mg Subcutaneous Q24H (prophylaxis, 1700)    gabapentin  300 mg Oral TID    methocarbamoL  500 mg Oral QID    mupirocin   Nasal BID    NIFEdipine  30 mg Oral Daily    sacubitriL-valsartan  1 tablet Oral BID     Continuous Infusions:  PRN Meds:  Current Facility-Administered Medications:     acetaminophen, 650 mg, Oral, Q4H PRN    albuterol-ipratropium, 3 mL, Nebulization, Q6H PRN    ALPRAZolam, 0.5 mg, Oral, TID PRN    aluminum-magnesium hydroxide-simethicone, 30 mL, Oral, QID PRN    ammonium lactate, , Topical (Top), BID PRN    bisacodyL, 10 mg, Rectal, Daily PRN    dextrose 50%, 12.5 g, Intravenous, PRN    dextrose 50%, 12.5 g, Intravenous,  "PRN    dextrose 50%, 25 g, Intravenous, PRN    glucagon (human recombinant), 1 mg, Intramuscular, PRN    glucose, 16 g, Oral, PRN    glucose, 24 g, Oral, PRN    hydrALAZINE, 10 mg, Intravenous, Q4H PRN    HYDROmorphone, 2 mg, Intravenous, Q4H PRN    labetalol, 10 mg, Intravenous, QID PRN    melatonin, 9 mg, Oral, Nightly PRN    naloxone, 0.02 mg, Intravenous, PRN    nicotine, 1 patch, Transdermal, Daily PRN    ondansetron, 8 mg, Oral, Q8H PRN    ondansetron, 4 mg, Intravenous, Q8H PRN    oxyCODONE, 30 mg, Oral, Q4H PRN    polyethylene glycol, 17 g, Oral, TID PRN    simethicone, 1 tablet, Oral, QID PRN    sodium chloride 0.9%, 10 mL, Intravenous, PRN     Objective:     VITAL SIGNS: 24 HR MIN & MAX LAST   Temp  Min: 97.8 °F (36.6 °C)  Max: 98.7 °F (37.1 °C)  98.7 °F (37.1 °C)   BP  Min: 88/55  Max: 186/84  133/70    Pulse  Min: 49  Max: 77  (!) 56    Resp  Min: 17  Max: 18  18    SpO2  Min: 92 %  Max: 97 %  (!) 92 %      HT: 6' 1" (185.4 cm)  WT: 70.1 kg (154 lb 8.7 oz)  BMI: 20.4     Intake/output:  Intake/Output - Last 3 Shifts         04/11 0700 04/12 0659 04/12 0700 04/13 0659 04/13 0700 04/14 0659    I.V. (mL/kg) 200 (2.9)      IV Piggyback 1050      Total Intake(mL/kg) 1250 (17.8)      Urine (mL/kg/hr) 2150 (1.3) 400 (0.2)     Total Output 2150 400     Net -900 -400                    Intake/Output Summary (Last 24 hours) at 4/13/2025 0823  Last data filed at 4/13/2025 0351  Gross per 24 hour   Intake --   Output 400 ml   Net -400 ml         Lines/drains/airway:       Peripheral IV - Single Lumen 04/12/25 0553 20 G Distal;Posterior;Right Forearm (Active)   Site Assessment Clean;Dry;Intact;No redness;No swelling 04/12/25 0554   Extremity Assessment Distal to IV No abnormal discoloration;No redness;No swelling;No warmth 04/12/25 0554   Line Status Capped;Flushed;Saline locked 04/12/25 0554   Dressing Status Clean;Dry;Intact 04/12/25 0554   Dressing Intervention First dressing 04/12/25 0554   Number of days: 0 " "           Urethral Catheter 04/12/25 0622 Coude 16 Fr. (Active)   $ [place] Insertion Bedside Insertion Performed 04/12/25 0623   Site Assessment Clean;Intact;Dry 04/12/25 0623   Collection Container Standard drainage bag 04/12/25 0623   Securement Method secured to top of thigh w/ adhesive device 04/12/25 0623   Catheter Care Performed yes 04/12/25 0623   Reason for Continuing Urinary Catheterization Urinary retention 04/12/25 0623   Number of days: 0       Physical examination:  Gen: NAD, AAOx3, answering questions appropriately  HEENT: NC  CV: RR  Resp: NWOB  Abd: S/NT/ND   Ext: moving all extremities spontaneously and purposefully  Neuro: CN II-XII grossly intact  Skin/wounds: BLE ampuations sites with dressings in place, some blood on dressings to BLE     Labs:  Renal:  Recent Labs     04/11/25  0610 04/12/25  0857 04/13/25  0512   BUN 23.8 21.8 32.5*   CREATININE 1.18 1.15 1.20     No results for input(s): "LACTIC" in the last 72 hours.  FENGI:  Recent Labs     04/11/25  0610 04/12/25  0857 04/13/25  0512   * 128* 130*   K 3.9 4.2 3.9   CL 94* 95* 94*   CO2 27 20* 24   CALCIUM 8.7* 9.0 9.2     Heme:  Recent Labs     04/11/25  0610 04/12/25  0857 04/13/25  0512   HGB 12.9* 11.5* 11.3*   HCT 38.8* 35.5* 33.8*    248 226     ID:  Recent Labs     04/11/25  0610 04/12/25  0857 04/13/25  0512   WBC 11.41 14.45* 16.95*     CBG:  Recent Labs     04/11/25  0610 04/12/25  0857 04/13/25  0512   GLUCOSE 100 116* 109      Cardiovascular:  Recent Labs   Lab 04/06/25  1849 04/07/25  0814 04/07/25  1131   TROPONINI 0.526* 0.372* 0.338*     ABG:  No results for input(s): "PH", "PO2", "PCO2", "HCO3", "BE" in the last 168 hours.   I have reviewed all pertinent lab results within the past 24 hours.    Imaging:  CTA Runoff ABD PEL Bilat Lower Ext   Final Result      1. Interval progression in the focal areas of decreased enhancement with associated mild perinephric fat stranding in the lower pole of the left kidney. " This may represent a non expansile neoplastic process, pyelonephritis is not entirely excluded. Correlate with clinical and laboratory findings as regards further evaluation and follow-up.   2. Peripherally thrombus in the distal thoracic aorta through to the distal abdominal aorta/bifurcation with associated mild to moderate aortic stenosis.   3. 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  Moderate stenosis of the origin of the celiac artery is also noted.  Correlate for enteric hypoperfusion symptoms.   4. Total occlusion of the right common iliac, bilateral internal iliac, and right external iliac arteries.   5. Total occlusion of the left common femoral artery on image 203 series 15 without enhancement in the left superficial femoral, popliteal and trifurcation vessels. The delayed phase images show no flow in the left popliteal artery with possibly some minimal patchy reconstituted flow in scattered short segments of the trifurcation vessels with posterior tibial artery flow at the ankle. Correlate clinically as regards additional evaluation and follow-up possibly with digital subtraction angiography if clinically indicated.   6. Early phase imaging does not demonstrate flow in the right lower extremity arteries. The delayed phase images (series 37) demonstrates some reconstituted flow in the right distal superficial femoral artery and popliteal artery with pronounced calcification of the trifurcation vessels but with intermittent identification of contrast in the proximal trifurcation vessels with some contrast in the peroneal artery at the ankle with some reconstituted anterior and posterior tibial flow at the ankle as well. Consider follow-up with digital subtraction angiography if clinically indicated.   7. Details and other findings as discussed above.   The findings are concordant with the [facility] report.         Electronically signed by: Sarthak Holland   Date:    04/07/2025   Time:    11:34         I have reviewed all pertinent imaging results/findings within the past 24 hours.    Micro/Path/Other:  Microbiology Results (last 7 days)       ** No results found for the last 168 hours. **           Specimens (From admission, onward)       Start     Ordered    04/11/25 1104  Specimen to Pathology  RELEASE UPON ORDERING        References:    Click here for ordering Quick Tip   Question:  Release to patient  Answer:  Immediate    04/11/25 1104    04/08/25 0048  Specimen to Pathology Other  RELEASE UPON ORDERING        References:    Click here for ordering Quick Tip   Question:  Release to patient  Answer:  Immediate    04/08/25 0048                   Pathology Results  (Last 365 days)                 04/08/25 0047  Specimen to Pathology Other Final result             Assessment & Plan:   69M POD#2 s/p BL AKA     -Will plan for take back for formalization tomorrow Monday 4/14  -Dressings down today   -Multimodal pain control       Vonnie Grullon MD  [employer] General Surgery PGY4

## 2025-04-13 NOTE — PROGRESS NOTES
[facility] 9th Floor Medical Telemetry  Wound Care    Patient Name:  Prasanna Whiting   MRN:  [id number]  Date: 4/13/2025  Diagnosis: Arterial embolus of lower extremity    History:     Past Medical History:   Diagnosis Date    Contusion of cervical cord     Hepatitis     Hypertension     Mental disorder        Social History[1]    Precautions:     Allergies as of 04/06/2025    (No Known Allergies)       WOC Assessment Details/Treatment        04/13/25 0938   WOCN Assessment   Visit Date 04/13/25   Visit Time 0938   Consult Type New   [facility] Speciality Wound   Wound surgical   Intervention assessed;changed;applied;chart review;coordination of care;orders   Teaching on-going        Wound 04/11/25 1121 Incision Left Thigh   Date First Assessed/Time First Assessed: 04/11/25 1121   Primary Wound Type: Incision  Side: Left  Location: Thigh  Additional Comments: xeroform, 4x4, abd, kerlix, ace   Wound Image    Dressing Appearance Moist drainage;Saturated   Drainage Amount Large   Drainage Characteristics/Odor Sanguineous   Appearance Pink;Red;Other (see comments)  (blood clots)   Black (%), Wound Tissue Color 0 %   Red (%), Wound Tissue Color 100 %   Yellow (%), Wound Tissue Color 0 %   Periwound Area Intact;Dry   Wound Edges Defined;Irregular   Wound Length (cm) 8 cm   Wound Width (cm) 7.3 cm   Wound Depth (cm) 5.3 cm   Wound Volume (cm^3) 162.064 cm^3   Wound Surface Area (cm^2) 45.87 cm^2   Care Cleansed with:;Antimicrobial agent;Wound cleanser;Other (see comments)  (vashe)   Dressing Changed;Gauze, wet to dry;Rolled gauze;Absorptive Pad;Compression wrap;Other (comment)  (vashe moistened gauze)        Wound 04/11/25 1122 Incision Right Thigh   Date First Assessed/Time First Assessed: 04/11/25 1122   Primary Wound Type: Incision  Side: Right  Location: Thigh  Closure Method: No Closure (see comments)  Additional Comments: xeroform, 4x4, abd, kerlix, ace   Wound Image    Dressing Appearance Moist drainage    Drainage Amount Large   Drainage Characteristics/Odor Sanguineous   Appearance Pink;Red   Black (%), Wound Tissue Color 0 %   Red (%), Wound Tissue Color 100 %   Yellow (%), Wound Tissue Color 0 %   Periwound Area Dry;Intact   Wound Edges Defined;Irregular   Wound Length (cm) 8 cm   Wound Width (cm) 10 cm   Wound Depth (cm) 4.3 cm   Wound Volume (cm^3) 180.118 cm^3   Wound Surface Area (cm^2) 62.83 cm^2   Care Cleansed with:;Antimicrobial agent;Wound cleanser;Other (see comments)  (vashe)   Dressing Changed;Gauze, wet to dry;Rolled gauze;Absorptive Pad;Compression wrap;Other (comment)  (vashe moistened gauze)     WOCN re-consulted for bilateral AKA wound sites. Spoke with assigned nurse prior to entry to patient room. Patient awake, in bed. Family at bedside. Patient pre-medicated. Educated family patient about the type of dressing change and how it promotes healing. Patient family verbalized understanding. Treatment recommendations: bilateral AKA: cleanse with vashe, apply vashe moistened gauze, cover with abd, wrap with kerlix then ACE wrap BID and PRN soilage. Patient going for formalization tomorrow per general surgery note on 4/13. Nursing to continue with current treatment recommendations. Wound care will follow.     04/13/2025         [1]   Social History  Socioeconomic History    Marital status: Single   Tobacco Use    Smoking status: Every Day     Current packs/day: 0.50     Types: Cigarettes    Smokeless tobacco: Never   Substance and Sexual Activity    Alcohol use: Never    Drug use: Yes     Social Drivers of Health     Financial Resource Strain: Low Risk  (4/8/2025)    Overall Financial Resource Strain (CARDIA)     Difficulty of Paying Living Expenses: Not very hard   Food Insecurity: No Food Insecurity (4/8/2025)    Hunger Vital Sign     Worried About Running Out of Food in the Last Year: Never true     Ran Out of Food in the Last Year: Never true   Transportation Needs: No Transportation Needs  (4/8/2025)    PRAPARE - Transportation     Lack of Transportation (Medical): No     Lack of Transportation (Non-Medical): No   Physical Activity: Inactive (4/8/2025)    Exercise Vital Sign     Days of Exercise per Week: 0 days     Minutes of Exercise per Session: 0 min   Stress: No Stress Concern Present (4/8/2025)    [employer] - Occupational Stress Questionnaire     Feeling of Stress : Not at all   Housing Stability: Low Risk  (4/8/2025)    Housing Stability Vital Sign     Unable to Pay for Housing in the Last Year: No     Homeless in the Last Year: No

## 2025-04-13 NOTE — PROGRESS NOTES
[facility]  Hospital Medicine Progress Note        Chief Complaint: Inpatient Follow-up     HPI:   69-year-old male with no known medical condition as he states he has not seen a physician in 4 years. Prior to that he says he took a proximally 15 medications on a daily basis but has not taken anything for the last 4 years. Patient presents today with complaint of left lower extremity burning that started yesterday.      Vascular surgery evaluated.  Performed angiography.  He has bilateral lower extremity atherosclerotic disease along with acute and subacute thromboembolism.  That has no options for mean for revascularization.  The recommending likely amputation.  General surgery consulted.  Plan for bilateral AKA on 04/11.  Surgery went as expected.  Returned to the floor in stable condition on 4/11     Interval Hx:  Postop day 2.  Pain better this morning.  Went up on his analgesia yesterday.  No current complaints.  White count has been creeping up though.  Surgery monitoring his wounds.  Possibly just reactive.  Afebrile and vital signs are stable      Objective/physical exam:  General: In no acute distress, afebrile  Chest: Clear to auscultation bilaterally  Heart: RRR, +S1, S2, no appreciable murmur  Abdomen: Soft, nontender, BS +  MSK:  s/p B AKA  Neurologic: Alert and oriented x4, Cranial nerve II-XII intact, Strength 5/5 in all 4 extremities    VITAL SIGNS: 24 HRS MIN & MAX LAST   Temp  Min: 97.8 °F (36.6 °C)  Max: 98.3 °F (36.8 °C) 97.8 °F (36.6 °C)   BP  Min: 88/55  Max: 186/84 105/68   Pulse  Min: 49  Max: 77  (!) 56   Resp  Min: 17  Max: 18 18   SpO2  Min: 94 %  Max: 97 % (!) 94 %       Recent Labs   Lab 04/11/25  0610 04/12/25  0857 04/13/25  0512   WBC 11.41 14.45* 16.95*   RBC 4.75 4.32* 4.13*   HGB 12.9* 11.5* 11.3*   HCT 38.8* 35.5* 33.8*   MCV 81.7 82.2 81.8   MCH 27.2 26.6* 27.4   MCHC 33.2 32.4* 33.4   RDW 15.6 15.8 15.9    248 226   MPV 10.2 10.5* 10.9*        Recent Labs   Lab 04/07/25  0006 04/09/25  0523 04/10/25  0621 04/11/25  0610 04/12/25  0857 04/13/25  0512    132* 129* 132* 128* 130*   K 3.5 4.3 3.7 3.9 4.2 3.9    96* 94* 94* 95* 94*   CO2 20* 18* 22* 27 20* 24   BUN 16.9 20.1 26.3* 23.8 21.8 32.5*   CREATININE 1.31* 1.19 1.18 1.18 1.15 1.20   CALCIUM 8.2* 9.2 8.6* 8.7* 9.0 9.2   MG  --  2.20 2.30  --   --   --    ALBUMIN 2.7* 4.0 3.6  --   --   --    ALKPHOS 40 51 47  --   --   --    ALT 5 6 9  --   --   --    AST 11 29 39  --   --   --    BILITOT 0.4 0.8 0.6  --   --   --           Microbiology Results (last 7 days)       ** No results found for the last 168 hours. **             Radiology:  Echo    Left Ventricle: The left ventricle is normal in size. There is   concentric remodeling. Moderate global hypokinesis present. Septal motion   is abnormal is consistent with bundle branch block. There is moderately   reduced systolic function with a visually estimated ejection fraction of   30 - 40%. Grade I diastolic dysfunction.    Right Ventricle: The right ventricle is normal in size. Systolic   function is normal.    Left Atrium: Mildly dilated    Aortic Valve: Aortic valve peak velocity is 1.1 m/s. Mean gradient is 3   mmHg.  CTA Runoff ABD PEL Bilat Lower Ext  Narrative: EXAMINATION:  CTA RUNOFF ABD PEL BILAT LOWER EXT    CLINICAL HISTORY:  Arterial embolism, lower extremity;Claudication or leg ischemia;    TECHNIQUE:  Arterial phase axial CT images were obtained through the abdomen and pelvis following IV administration of IV contrast. Coronal and sagittal reconstructions were performed. 3D reconstructions were submitted and interpreted. Dose reduction techniques including automatic exposure control (AEC) were utilized.  Dose (DLP): 1834 mGycm    COMPARISON:  08/19/2024 CT abdomen and pelvis    FINDINGS:  Thorax:    Lungs: There is mild nonspecific dependent change at the lung bases. No focal infiltrate or consolidation is seen.    Pleura:  No effusions or thickening.    Heart: The heart size is within normal limits. Moderate coronary artery calcification is seen.    Abdomen:    Abdominal Wall: No abdominal wall pathology is seen.    Liver: The liver appears unremarkable.    Biliary System: No intrahepatic or extrahepatic biliary duct dilatation is seen.    Gallbladder: The gallbladder appears unremarkable.    Pancreas: Mild pancreatic atrophy is seen.    Spleen: The spleen appears unremarkable.    Adrenals: There is no significant interval change in the 2.4 cm low attenuation nodule at the medial limb of the right adrenal gland on image 21 series 4.    Kidneys: There is focal cortical thinning in the right kidney consistent with scarring. There is interval progression in the focal areas of decreased enhancement with associated mild perinephric fat stranding in the lower pole of the left kidney.    Bowel:    Esophagus: The visualized esophagus appears unremarkable.    Stomach: The stomach appears unremarkable.    Duodenum: Unremarkable appearing duodenum.    Small Bowel: The small bowel appears unremarkable.    Colon: Nondistended.    Appendix: The appendix is not identified but no inflammatory changes are seen in the right lower quadrant to suggest appendicitis.    Peritoneum: No intraperitoneal free air or ascites is seen.    Pelvis:    Bladder: Th bladder appears unremarkable.    Male:    Prostate gland: There are a few calcifications in the prostate gland.    Bony structures:    Dorsal Spine: There is moderate spondylosis of the visualized dorsal spine.    Bony Pelvis: There is mild degenerative change of the bilateral hips.    Hip: The visualized structures of the hip appear unremarkable.    Lower extremity bony structures: The bones of the left lower extremity appear intact. The bones of the right lower extremity appear intact.    Vascular Structures:    Aorta: There is moderate calcification of the abdominal aorta. There is peripherally located  thrombus in the distal thoracic aorta extending inferiorly to the distal abdominal aorta/bifurcation with associated mild to moderate distal abdominal aortic stenosis.  There is total occlusion of the superior mesenteric artery on image 146 series 16 with reconstitution of flow seen in the distal branches through gastroduodenal collaterals with the celiac artery.  There is total occlusion of the inferior mesenteric artery on image 257 a with contrast opacifying the distal branches through collaterals.  Right iliac arteries: There is total occlusion of the right common iliac artery with no enhancement noted in the right internal and external iliac arteries.    Left Common Iliac Artery: Left common iliac stent is seen in place and appears patent. There is total occlusion of the left internal iliac artery at its origin without enhancement in the distal segment.    Right Lower extremity arteries: The early phase imaging does not demonstrate flow in the right lower extremity arteries. The delayed phase images (series 37) demonstrates some reconstituted flow in the right distal superficial femoral artery and popliteal artery with pronounced calcification of the trifurcation vessels but with intermittent identification of contrast in the proximal trifurcation vessels with some contrast in the peroneal artery at the ankle with some reconstituted anterior and posterior tibial flow at the ankle as well.    Left Lower extremity arteries: There is total occlusion of the left common femoral artery on image 203 series 15 without enhancement in the left superficial femoral, popliteal and trifurcation vessels. The delayed phase images show no flow in the left popliteal artery with possibly some minimal patchy reconstituted flow in scattered short segments of the trifurcation vessels with posterior tibial artery flow at the ankle.  Impression: 1. Interval progression in the focal areas of decreased enhancement with associated mild  perinephric fat stranding in the lower pole of the left kidney. This may represent a non expansile neoplastic process, pyelonephritis is not entirely excluded. Correlate with clinical and laboratory findings as regards further evaluation and follow-up.  2. Peripherally thrombus in the distal thoracic aorta through to the distal abdominal aorta/bifurcation with associated mild to moderate aortic stenosis.  3. 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  Moderate stenosis of the origin of the celiac artery is also noted.  Correlate for enteric hypoperfusion symptoms.  4. Total occlusion of the right common iliac, bilateral internal iliac, and right external iliac arteries.  5. Total occlusion of the left common femoral artery on image 203 series 15 without enhancement in the left superficial femoral, popliteal and trifurcation vessels. The delayed phase images show no flow in the left popliteal artery with possibly some minimal patchy reconstituted flow in scattered short segments of the trifurcation vessels with posterior tibial artery flow at the ankle. Correlate clinically as regards additional evaluation and follow-up possibly with digital subtraction angiography if clinically indicated.  6. Early phase imaging does not demonstrate flow in the right lower extremity arteries. The delayed phase images (series 37) demonstrates some reconstituted flow in the right distal superficial femoral artery and popliteal artery with pronounced calcification of the trifurcation vessels but with intermittent identification of contrast in the proximal trifurcation vessels with some contrast in the peroneal artery at the ankle with some reconstituted anterior and posterior tibial flow at the ankle as well. Consider follow-up with digital subtraction angiography if clinically indicated.  7. Details and other findings as  discussed above.  The findings are concordant with the [facility] report.    Electronically signed by: Sarthak Holland  Date:    04/07/2025  Time:    11:34        Medications:  Scheduled Meds:   atorvastatin  80 mg Oral Daily    carvediloL  6.25 mg Oral BID    gabapentin  300 mg Oral TID    heparin (PORCINE)  80 Units/kg Intravenous Once    methocarbamoL  500 mg Oral QID    mupirocin   Nasal BID    NIFEdipine  30 mg Oral Daily    sacubitriL-valsartan  1 tablet Oral BID     Continuous Infusions:  PRN Meds:.  Current Facility-Administered Medications:     acetaminophen, 650 mg, Oral, Q4H PRN    albuterol-ipratropium, 3 mL, Nebulization, Q6H PRN    ALPRAZolam, 0.5 mg, Oral, TID PRN    aluminum-magnesium hydroxide-simethicone, 30 mL, Oral, QID PRN    ammonium lactate, , Topical (Top), BID PRN    bisacodyL, 10 mg, Rectal, Daily PRN    dextrose 50%, 12.5 g, Intravenous, PRN    dextrose 50%, 12.5 g, Intravenous, PRN    dextrose 50%, 25 g, Intravenous, PRN    glucagon (human recombinant), 1 mg, Intramuscular, PRN    glucose, 16 g, Oral, PRN    glucose, 24 g, Oral, PRN    heparin (PORCINE), 60 Units/kg, Intravenous, PRN    heparin (PORCINE), 30 Units/kg, Intravenous, PRN    hydrALAZINE, 10 mg, Intravenous, Q4H PRN    HYDROmorphone, 2 mg, Intravenous, Q4H PRN    labetalol, 10 mg, Intravenous, QID PRN    melatonin, 9 mg, Oral, Nightly PRN    naloxone, 0.02 mg, Intravenous, PRN    nicotine, 1 patch, Transdermal, Daily PRN    ondansetron, 8 mg, Oral, Q8H PRN    ondansetron, 4 mg, Intravenous, Q8H PRN    oxyCODONE, 30 mg, Oral, Q4H PRN    polyethylene glycol, 17 g, Oral, TID PRN    simethicone, 1 tablet, Oral, QID PRN    sodium chloride 0.9%, 10 mL, Intravenous, PRN    Nutrition:  Nutrition consulted. Most recent weight and BMI monitored-     Measurements:  Wt Readings from Last 1 Encounters:   04/07/25 70.1 kg (154 lb 8.7 oz)   Body mass index is 20.39 kg/m².    Patient has been screened and assessed by  RD.    Malnutrition Type:  Context: chronic illness  Level: moderate    Malnutrition Characteristic Summary:  Weight Loss (Malnutrition): other (see comments) (Does not meet criteria)  Energy Intake (Malnutrition): other (see comments) (Does not meet criteria)  Subcutaneous Fat (Malnutrition): mild depletion  Muscle Mass (Malnutrition): mild depletion  Fluid Accumulation (Malnutrition): other (see comments) (Not present)  Hand  Strength, Right (Malnutrition): Unable to assess    Interventions/Recommendations (treatment strategy):  Oral nutritional supplement        Assessment/Plan:   Left internal iliac/common femoral occlusion   Left lower extremity ischemia s/p B AKA 4/11  Distal thoracic aortic thrombus   SMA occlusion   NSTEMI, type 2 demand ischemia   Hypertensive urgency   Acute on chronic kidney disease, unknown baseline   Adrenal adenoma   Tobacco abuse      Blood pressure much better.  Continue with current regimen.    White count has been creeping up after surgery.  He is not currently on any antibiotics.  Afebrile.  Possibly just reactive.  Will just monitor for now.  Surgery to change out his bandages today.  If he does have some erythema but could consider some antimicrobial coverage.  If he is starts spiking fevers will check blood cultures.  Needs to follow up with Cardiology as an outpatient for stress test.    Suspect that he will require some type of placement secondary to bilateral AKA       Yifan Cottrell MD   04/13/2025     All diagnosis and differential diagnosis have been reviewed; assessment and plan has been documented; I have personally reviewed the labs and test results that are presently available; I have reviewed the patients medication list; I have reviewed the consulting providers response and recommendations. I have reviewed or attempted to review medical records based upon their availability    All of the patient's questions have been  addressed and answered. Patient's is  agreeable to the above stated plan. I will continue to monitor closely and make adjustments to medical management as needed.  _____________________________________________________________________

## 2025-04-13 NOTE — AI DETERIORATION ALERT
Artificial Intelligence Notification  [facility]  [address]  [place] [address]  Phone: [phone]    This documentation was triggered by an Artificial Intelligence Notification:    Admit Date: 2025   LOS: 7  Code Status: Full Code  : 1955  Age: 69 y.o.  Weight:   Wt Readings from Last 1 Encounters:   25 70.1 kg (154 lb 8.7 oz)        Sex: male  Bed: [address] [place] [address]  MRN: [id number]  Attending Physician: Yifan Cottrell MD     Date of Alert: 2025  Time AI Alert Received: 1146            Vitals:    25 1245   BP: 123/63   Pulse: (!) 48   Resp:    Temp:      SpO2: 99 %      Artificial Intelligence alert discussed with Provider:     Name: charge nurse   Date/Time of Provider Notification: 2025 @ 1250      Patient Condition: pt stable now, got pain meds before wound care, BP dropped from pain meds. MD aware, fluid bolus ordered. BP improved, no ICU interventions at this time. Please call if need anything

## 2025-04-14 PROBLEM — I99.8 ISCHEMIC REST PAIN OF LOWER EXTREMITY: Status: ACTIVE | Noted: 2025-01-01

## 2025-04-14 PROBLEM — G54.6: Status: ACTIVE | Noted: 2025-01-01

## 2025-04-14 PROBLEM — M79.606 ISCHEMIC REST PAIN OF LOWER EXTREMITY: Status: ACTIVE | Noted: 2025-01-01

## 2025-04-14 PROBLEM — Z71.89 GOALS OF CARE, COUNSELING/DISCUSSION: Status: ACTIVE | Noted: 2025-01-01

## 2025-04-14 NOTE — PROGRESS NOTES
Acute Care Surgery   Progress Note  Admit Date: 4/6/2025  HD#8  POD#* Day of Surgery *    Subjective:   Interval history:  NAEON  HDS, AF  Pain to BL stumps is well tolerated  Patient consented for BL AKA formalization    Home Meds:  Current Outpatient Medications   Medication Instructions    albuterol (PROVENTIL/VENTOLIN HFA) 90 mcg/actuation inhaler No dose, route, or frequency recorded.    ATROVENT HFA 17 mcg/actuation inhaler No dose, route, or frequency recorded.    baclofen (LIORESAL) 20 MG tablet No dose, route, or frequency recorded.    DULoxetine (CYMBALTA) 30 MG capsule No dose, route, or frequency recorded.    enalapril (VASOTEC) 20 MG tablet No dose, route, or frequency recorded.    finasteride (PROSCAR) 5 mg tablet No dose, route, or frequency recorded.    ibuprofen (ADVIL,MOTRIN) 100 mg/5 mL suspension No dose, route, or frequency recorded.    omeprazole (PRILOSEC) 40 MG capsule No dose, route, or frequency recorded.    predniSONE (DELTASONE) 20 MG tablet No dose, route, or frequency recorded.    rosuvastatin (CRESTOR) 20 MG tablet No dose, route, or frequency recorded.    VITAMIN D2 50,000 unit capsule No dose, route, or frequency recorded.      Scheduled Meds:   atorvastatin  80 mg Oral Daily    carvediloL  6.25 mg Oral BID    enoxparin  40 mg Subcutaneous Q24H (prophylaxis, 1700)    gabapentin  300 mg Oral TID    methocarbamoL  500 mg Oral QID    mupirocin   Nasal BID    NIFEdipine  30 mg Oral Daily    sacubitriL-valsartan  1 tablet Oral BID     Continuous Infusions:   0.9% NaCl   Intravenous Continuous 75 mL/hr at 04/13/25 1222 New Bag at 04/13/25 1222     PRN Meds:  Current Facility-Administered Medications:     acetaminophen, 650 mg, Oral, Q4H PRN    albuterol-ipratropium, 3 mL, Nebulization, Q6H PRN    ALPRAZolam, 0.5 mg, Oral, TID PRN    aluminum-magnesium hydroxide-simethicone, 30 mL, Oral, QID PRN    ammonium lactate, , Topical (Top), BID PRN    bisacodyL, 10 mg, Rectal, Daily PRN     "ceFAZolin (Ancef) IV (PEDS and ADULTS), 2 g, Intravenous, On Call Procedure    dextrose 50%, 12.5 g, Intravenous, PRN    dextrose 50%, 12.5 g, Intravenous, PRN    dextrose 50%, 25 g, Intravenous, PRN    glucagon (human recombinant), 1 mg, Intramuscular, PRN    glucose, 16 g, Oral, PRN    glucose, 24 g, Oral, PRN    hydrALAZINE, 10 mg, Intravenous, Q4H PRN    HYDROmorphone, 1 mg, Intravenous, Q6H PRN    labetalol, 10 mg, Intravenous, QID PRN    melatonin, 9 mg, Oral, Nightly PRN    naloxone, 0.02 mg, Intravenous, PRN    nicotine, 1 patch, Transdermal, Daily PRN    ondansetron, 8 mg, Oral, Q8H PRN    ondansetron, 4 mg, Intravenous, Q8H PRN    oxyCODONE, 30 mg, Oral, Q4H PRN    polyethylene glycol, 17 g, Oral, TID PRN    simethicone, 1 tablet, Oral, QID PRN    sodium chloride 0.9%, 10 mL, Intravenous, PRN     Objective:     VITAL SIGNS: 24 HR MIN & MAX LAST   Temp  Min: 97.4 °F (36.3 °C)  Max: 99.7 °F (37.6 °C)  98 °F (36.7 °C)   BP  Min: 70/48  Max: 133/70  122/61    Pulse  Min: 45  Max: 62  62    Resp  Min: 18  Max: 18  18    SpO2  Min: 92 %  Max: 100 %  98 %      HT: 6' 1" (185.4 cm)  WT: 70.1 kg (154 lb 8.7 oz)  BMI: 20.4     Intake/output:  Intake/Output - Last 3 Shifts         04/12 0700  04/13 0659 04/13 0700 04/14 0659    P.O.  240    Total Intake(mL/kg)  240 (3.4)    Urine (mL/kg/hr) 400 (0.2) 525 (0.3)    Total Output 400 525    Net -400 -285                  Intake/Output Summary (Last 24 hours) at 4/14/2025 0635  Last data filed at 4/13/2025 1935  Gross per 24 hour   Intake 240 ml   Output 525 ml   Net -285 ml         Lines/drains/airway:       Peripheral IV - Single Lumen 04/12/25 0553 20 G Distal;Posterior;Right Forearm (Active)   Site Assessment Clean;Dry;Intact;No redness;No swelling 04/12/25 0554   Extremity Assessment Distal to IV No abnormal discoloration;No redness;No swelling;No warmth 04/12/25 0554   Line Status Capped;Flushed;Saline locked 04/12/25 0554   Dressing Status Clean;Dry;Intact " "04/12/25 0554   Dressing Intervention First dressing 04/12/25 0554   Number of days: 0            Urethral Catheter 04/12/25 0622 Coude 16 Fr. (Active)   $ [place] Insertion Bedside Insertion Performed 04/12/25 0623   Site Assessment Clean;Intact;Dry 04/12/25 0623   Collection Container Standard drainage bag 04/12/25 0623   Securement Method secured to top of thigh w/ adhesive device 04/12/25 0623   Catheter Care Performed yes 04/12/25 0623   Reason for Continuing Urinary Catheterization Urinary retention 04/12/25 0623   Number of days: 0       Physical examination:  Gen: NAD, AAOx3, answering questions appropriately  HEENT: NC  CV: RR  Resp: NWOB  Abd: S/NT/ND   Ext: moving all extremities spontaneously and purposefully  Neuro: CN II-XII grossly intact  Skin/wounds: BLE ampuations sites with dressings in place, some blood on dressings to BLE     Labs:  Renal:  Recent Labs     04/12/25 0857 04/13/25  0512   BUN 21.8 32.5*   CREATININE 1.15 1.20     No results for input(s): "LACTIC" in the last 72 hours.  FENGI:  Recent Labs     04/12/25  0857 04/13/25  0512   * 130*   K 4.2 3.9   CL 95* 94*   CO2 20* 24   CALCIUM 9.0 9.2     Heme:  Recent Labs     04/12/25  0857 04/13/25  0512 04/13/25  1219   HGB 11.5* 11.3* 9.9*   HCT 35.5* 33.8* 30.8*    226  --      ID:  Recent Labs     04/12/25  0857 04/13/25  0512   WBC 14.45* 16.95*     CBG:  Recent Labs     04/12/25  0857 04/13/25  0512   GLUCOSE 116* 109      Cardiovascular:  Recent Labs   Lab 04/07/25  0814 04/07/25  1131   TROPONINI 0.372* 0.338*     ABG:  No results for input(s): "PH", "PO2", "PCO2", "HCO3", "BE" in the last 168 hours.   I have reviewed all pertinent lab results within the past 24 hours.    Imaging:  CTA Runoff ABD PEL Bilat Lower Ext   Final Result      1. Interval progression in the focal areas of decreased enhancement with associated mild perinephric fat stranding in the lower pole of the left kidney. This may represent a non expansile " neoplastic process, pyelonephritis is not entirely excluded. Correlate with clinical and laboratory findings as regards further evaluation and follow-up.   2. Peripherally thrombus in the distal thoracic aorta through to the distal abdominal aorta/bifurcation with associated mild to moderate aortic stenosis.   3. 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  Moderate stenosis of the origin of the celiac artery is also noted.  Correlate for enteric hypoperfusion symptoms.   4. Total occlusion of the right common iliac, bilateral internal iliac, and right external iliac arteries.   5. Total occlusion of the left common femoral artery on image 203 series 15 without enhancement in the left superficial femoral, popliteal and trifurcation vessels. The delayed phase images show no flow in the left popliteal artery with possibly some minimal patchy reconstituted flow in scattered short segments of the trifurcation vessels with posterior tibial artery flow at the ankle. Correlate clinically as regards additional evaluation and follow-up possibly with digital subtraction angiography if clinically indicated.   6. Early phase imaging does not demonstrate flow in the right lower extremity arteries. The delayed phase images (series 37) demonstrates some reconstituted flow in the right distal superficial femoral artery and popliteal artery with pronounced calcification of the trifurcation vessels but with intermittent identification of contrast in the proximal trifurcation vessels with some contrast in the peroneal artery at the ankle with some reconstituted anterior and posterior tibial flow at the ankle as well. Consider follow-up with digital subtraction angiography if clinically indicated.   7. Details and other findings as discussed above.   The findings [facility].          Electronically signed by: Sarthak Holland   Date:    04/07/2025   Time:    11:34         I have reviewed all pertinent imaging results/findings within the past 24 hours.    Micro/Path/Other:  Microbiology Results (last 7 days)       ** No results found for the last 168 hours. **           Specimens (From admission, onward)       Start     Ordered    04/11/25 1104  Specimen to Pathology  RELEASE UPON ORDERING        References:    Click here for ordering Quick Tip   Question:  Release to patient  Answer:  Immediate    04/11/25 1104    04/08/25 0048  Specimen to Pathology Other  RELEASE UPON ORDERING        References:    Click here for ordering Quick Tip   Question:  Release to patient  Answer:  Immediate    04/08/25 0048                   Pathology Results  (Last 365 days)                 04/08/25 0047  Specimen to Pathology Other Final result             Assessment & Plan:   69M POD#2 s/p BL AKA     - Patient to OR today for formalization    Abel Murdock MD  [employer] General Surgery PGY1

## 2025-04-14 NOTE — PROGRESS NOTES
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 We discussed the patient's goals of care and treatment preferences.        Spoke with patient at bedside. He recently got back to his room from PACU. He reports his pain is better controlled since we have started the Oxy IR. He does report pain in his foot and I explained phantom limb pain. He states this is what is causing him the most pain currently. I explained that we could try a different medication to see if this may help with the phantom pain and he is in agreement to try something else.     Case discussed with hospital medicine.    Recommendations:     Continue Oxycodone IR 30mg q4 PRN pain. Dilaudid available for breakthrough. Will start amitriptyline 25 mg nightly to see if this will help with the phantom limb pain.      Interval History:     Underwent bilateral AKA on 04/11/2025. Then he underwent formalization of bilateral steps today. Pain appears better controlled with Oxy IR. Dose was increased over the weekend.       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    Bilateral arm weakness 07/26/2019    S/P cervical spinal fusion 12/18/2019     Resolved Ambulatory Problems     Diagnosis Date Noted    No Resolved Ambulatory Problems     Past Medical History:   Diagnosis Date    Hepatitis      Hypertension     Mental disorder         Past Surgical History:   Procedure Laterality Date    EMBOLECTOMY, LOWER EXTREMITY Bilateral 4/7/2025    Procedure: EMBOLECTOMY, LOWER EXTREMITY;  Surgeon: Anjum Lucero III, MD;  Location: [facility] OR;  Service: Peripheral Vascular;  Laterality: Bilateral;  BILAT LOWER EXTREMITY EMBOLECTOMY    FRACTURE SURGERY      SPINE SURGERY  09/04/2019    ACDF @ C3/4, 09/04/19, [facility], DR. SIN.    SPINE SURGERY  10/18/2019    C3-6 DCL, [facility], DR. SIN.        Review of patient's allergies indicates:  No Known Allergies     Current Medications[1]       Current Facility-Administered Medications:     acetaminophen, 650 mg, Oral, Q4H PRN    albuterol-ipratropium, 3 mL, Nebulization, Q6H PRN    ALPRAZolam, 0.5 mg, Oral, TID PRN    aluminum-magnesium hydroxide-simethicone, 30 mL, Oral, QID PRN    ammonium lactate, , Topical (Top), BID PRN    bisacodyL, 10 mg, Rectal, Daily PRN    ceFAZolin (Ancef) IV (PEDS and ADULTS), 2 g, Intravenous, On Call Procedure    dextrose 50%, 12.5 g, Intravenous, PRN    dextrose 50%, 12.5 g, Intravenous, PRN    dextrose 50%, 25 g, Intravenous, PRN    glucagon (human recombinant), 1 mg, Intramuscular, PRN    glucose, 16 g, Oral, PRN    glucose, 24 g, Oral, PRN    hydrALAZINE, 10 mg, Intravenous, Q4H PRN    HYDROmorphone, 1 mg, Intravenous, Q6H PRN    labetalol, 10 mg, Intravenous, QID PRN    melatonin, 9 mg, Oral, Nightly PRN    naloxone, 0.02 mg, Intravenous, PRN    nicotine, 1 patch, Transdermal, Daily PRN    ondansetron, 8 mg, Oral, Q8H PRN    ondansetron, 4 mg, Intravenous, Q8H PRN    oxyCODONE, 30 mg, Oral, Q4H PRN    polyethylene glycol, 17 g, Oral, TID PRN    simethicone, 1 tablet, Oral, QID PRN    sodium chloride 0.9%, 10 mL, Intravenous, PRN     Family History   Problem Relation Name Age of Onset    Heart disease Mother      Cancer Father            Review of Systems   Musculoskeletal:         Phantom limb pain            Objective:   /61 (BP Location: Right  "arm, Patient Position: Lying)   Pulse (!) 55   Temp 98 °F (36.7 °C) (Oral)   Resp 18   Ht 6' 1" (1.854 m)   Wt 70.1 kg (154 lb 8.7 oz)   SpO2 (!) 93% Comment: 2L NC  BMI 20.39 kg/m²      Physical Exam   Constitutional: He has a sickly appearance.   HENT:   Head: Normocephalic and atraumatic.   Cardiovascular: Normal rate. Pulmonary:      Effort: Pulmonary effort is normal.     Musculoskeletal:      Right Lower Extremity: Right leg is amputated above knee.      Left Lower Extremity: Left leg is amputated above knee.   Neurological: He is alert.            Review of Symptoms      Symptom Assessment (ESAS 0-10 Scale)  Pain:  0  Dyspnea:  0  Anxiety:  0  Nausea:  0  Depression:  0  Anorexia:  0  Fatigue:  0  Insomnia:  0  Restlessness:  0  Agitation:  0         Living Arrangements:  Lives with family    Psychosocial/Cultural:   See Palliative Psychosocial Note: Yes  **Primary  to Follow**  Palliative Care  Consult: No      Advance Care Planning   Advance Directives:   Goals of Care: What is most important right now is to focus on curative/life-prolongation (regardless of treatment burdens). Accordingly, we have decided that the best plan to meet the patient's goals includes continuing with treatment.          PAINAD: NA    Caregiver burden formerly assessed: Yes      No results displayed because visit has over 200 results.               > 50% of 35 min of encounter was spent in chart review, face to face discussion of goals of care, symptom assessment, coordination of care and emotional support.    Abi Marcus, JL  Palliative Medicine  [facility]           [1]   Current Facility-Administered Medications:     0.9% NaCl infusion, , Intravenous, Continuous, Yifan Cottrell MD, Last Rate: 75 mL/hr at 04/13/25 1222, New Bag at 04/13/25 1222    acetaminophen tablet 650 mg, 650 mg, Oral, Q4H PRN, Reyes, Thairy G, DO    albuterol-ipratropium 2.5 mg-0.5 mg/3 mL nebulizer " solution 3 mL, 3 mL, Nebulization, Q6H PRN, Reyes Thairy G, DO    ALPRAZolam tablet 0.5 mg, 0.5 mg, Oral, TID PRN, Deng Sheehan MD, 0.5 mg at 04/12/25 0013    aluminum-magnesium hydroxide-simethicone 200-200-20 mg/5 mL suspension 30 mL, 30 mL, Oral, QID PRN, Reyes, Thairy G, DO    ammonium lactate 12 % lotion, , Topical (Top), BID PRN, Reyes, Thairy G, DO    atorvastatin tablet 80 mg, 80 mg, Oral, Daily, Reyes, Deshaun G, DO, 80 mg at 04/13/25 0958    bisacodyL suppository 10 mg, 10 mg, Rectal, Daily PRN, Reyes, Thairy G, DO    carvediloL tablet 6.25 mg, 6.25 mg, Oral, BID, Yogi Schmitz, AGAP-BC, 6.25 mg at 04/14/25 0525    cefazolin (ANCEF) 2 gram in dextrose 5% 50 mL IVPB (premix), 2 g, Intravenous, On Call Procedure, Buddy Allen MD    dextrose 50% injection 12.5 g, 12.5 g, Intravenous, PRN, Reyes Thairy G, DO    dextrose 50% injection 12.5 g, 12.5 g, Intravenous, PRN, Pete Tatum MD    dextrose 50% injection 25 g, 25 g, Intravenous, PRN, Reyes Thairy G, DO    enoxaparin injection 40 mg, 40 mg, Subcutaneous, Q24H (prophylaxis, 1700), Yifan Cottrell MD, 40 mg at 04/13/25 1628    gabapentin capsule 300 mg, 300 mg, Oral, TID, [facility], 300 mg at 04/13/25 2034    glucagon (human recombinant) injection 1 mg, 1 mg, Intramuscular, PRN, Reyes, Thairy G, DO    glucose chewable tablet 16 g, 16 g, Oral, PRN, Reyes, Thairy G, DO    glucose chewable tablet 24 g, 24 g, Oral, PRN, Reyes, Thairy G, DO    hydrALAZINE injection 10 mg, 10 mg, Intravenous, Q4H PRN, Yifan Cottrell MD, 10 mg at 04/12/25 0431    HYDROmorphone (PF) injection 1 mg, 1 mg, Intravenous, Q6H PRN, Yifan Cottrell MD    labetaloL injection 10 mg, 10 mg, Intravenous, QID PRN, Reyes, Thairy G, DO, 10 mg at 04/08/25 0138    melatonin tablet 9 mg, 9 mg, Oral, Nightly PRN, Reyes, Thairy G, DO    methocarbamoL tablet 500 mg, 500 mg, Oral, QID, YadiVonnie pichardo MD, 500 mg at 04/13/25 2035    mupirocin 2 %  ointment, , Nasal, BID, Yifan Cottrell MD, Given at 04/13/25 2035    naloxone 0.4 mg/mL injection 0.02 mg, 0.02 mg, Intravenous, PRN, Reyes, Thairy G, DO    nicotine 14 mg/24 hr 1 patch, 1 patch, Transdermal, Daily PRN, Reyes, Thairy G, DO    NIFEdipine 24 hr tablet 30 mg, 30 mg, Oral, Daily, Reyes, Thairy G, , 30 mg at 04/13/25 0958    ondansetron disintegrating tablet 8 mg, 8 mg, Oral, Q8H PRN, Reyes, Thairy G,     ondansetron injection 4 mg, 4 mg, Intravenous, Q8H PRN, Reyes, Thairy G, , 4 mg at 04/08/25 0149    oxyCODONE immediate release tablet Tab 30 mg, 30 mg, Oral, Q4H PRN, Yifan Cottrell MD, 30 mg at 04/13/25 0015    polyethylene glycol packet 17 g, 17 g, Oral, TID PRN, Reyes, Thairy G,     sacubitriL-valsartan 24-26 mg per tablet 1 tablet, 1 tablet, Oral, BID, Yogi Schmitz, [facility], 1 tablet at 04/13/25 2034    simethicone chewable tablet 80 mg, 1 tablet, Oral, QID PRN, Reyes, Thairy G,     sodium chloride 0.9% flush 10 mL, 10 mL, Intravenous, PRN, Reyes, Thairy G, DO    Facility-Administered Medications Ordered in Other Encounters:     albumin human 25% bottle, , Intravenous, PRN, Dylan, Lan, CRNA, 100 mL at 04/14/25 0809    calcium chloride 100 mg/mL (10 %) injection, , Intravenous, PRN, Dylan, Lan, CRNA, 1 g at 04/14/25 0755    ceFAZolin injection, , Intravenous, PRN, Dylan, Lan, CRNA, 2 g at 04/14/25 0759    dexAMETHasone injection, , Intravenous, PRN, Dylan, Lan, CRNA, 4 mg at 04/14/25 0801    ePHEDrine sulfate, , Intravenous, PRN, Dylan, Lan, CRNA, 25 mg at 04/14/25 0804    etomidate injection, , Intravenous, PRN, Dylan, Lan, CRNA, 15 mg at 04/14/25 0750    fentaNYL injection, , Intravenous, PRN, Dylan, Lan, CRNA, 50 mcg at 04/14/25 0750    glycopyrrolate injection, , Intravenous, PRN, Dylan, Lan, CRNA, 0.2 mg at 04/14/25 0842    LIDOcaine (cardiac) injection, , Intravenous, PRN, Dylan, Lan, CRNA, 80 mg at 04/14/25 0750    propofol (DIPRIVAN) 10  mg/mL infusion, , Intravenous, PRN, Lan Richardson CRNA, 30 mg at 04/14/25 0750    rocuronium injection, , Intravenous, PRN, Lan Richardson CRNA, 20 mg at 04/14/25 0849    sodium chloride 0.9% infusion, , Intravenous, Continuous PRN, Lan Richardson CRNA, New Bag at 04/14/25 0743    vasopressin injection, , Intravenous, PRN, Lan Richardson CRNA, 1 Units at 04/14/25 0845

## 2025-04-14 NOTE — PROGRESS NOTES
[facility]  Hospital Medicine Progress Note        Chief Complaint: Inpatient Follow-up     HPI:   69-year-old male with no known medical condition as he states he has not seen a physician in 4 years. Prior to that he says he took a proximally 15 medications on a daily basis but has not taken anything for the last 4 years. Patient presents today with complaint of left lower extremity burning that started yesterday.      Vascular surgery evaluated.  Performed angiography.  He has bilateral lower extremity atherosclerotic disease along with acute and subacute thromboembolism.  That has no options for mean for revascularization.  The recommending likely amputation.  General surgery consulted.  Plan for bilateral AKA on 04/11.  Surgery went as expected.  Returned to the floor in stable condition on 4/11     Interval Hx:  NPO this am.  Back to OR today for formalization of wounds.  Pain controlled      Objective/physical exam:  General: In no acute distress, afebrile  Chest: Clear to auscultation bilaterally  Heart: RRR, +S1, S2, no appreciable murmur  Abdomen: Soft, nontender, BS +  MSK:  s/p B AKA  Neurologic: Alert and oriented x4, Cranial nerve II-XII intact, Strength 5/5 in all 4 extremities    VITAL SIGNS: 24 HRS MIN & MAX LAST   Temp  Min: 97.4 °F (36.3 °C)  Max: 99.7 °F (37.6 °C) 98 °F (36.7 °C)   BP  Min: 70/48  Max: 133/70 114/61   Pulse  Min: 45  Max: 62  (!) 55   Resp  Min: 18  Max: 18 18   SpO2  Min: 92 %  Max: 100 % (!) 93 % (2L NC)       Recent Labs   Lab 04/11/25  0610 04/12/25  0857 04/13/25  0512 04/13/25  1219   WBC 11.41 14.45* 16.95*  --    RBC 4.75 4.32* 4.13*  --    HGB 12.9* 11.5* 11.3* 9.9*   HCT 38.8* 35.5* 33.8* 30.8*   MCV 81.7 82.2 81.8  --    MCH 27.2 26.6* 27.4  --    MCHC 33.2 32.4* 33.4  --    RDW 15.6 15.8 15.9  --     248 226  --    MPV 10.2 10.5* 10.9*  --        Recent Labs   Lab 04/09/25  0523 04/10/25  0621 04/11/25  0610 04/12/25  0857  04/13/25  0512   * 129* 132* 128* 130*   K 4.3 3.7 3.9 4.2 3.9   CL 96* 94* 94* 95* 94*   CO2 18* 22* 27 20* 24   BUN 20.1 26.3* 23.8 21.8 32.5*   CREATININE 1.19 1.18 1.18 1.15 1.20   CALCIUM 9.2 8.6* 8.7* 9.0 9.2   MG 2.20 2.30  --   --   --    ALBUMIN 4.0 3.6  --   --   --    ALKPHOS 51 47  --   --   --    ALT 6 9  --   --   --    AST 29 39  --   --   --    BILITOT 0.8 0.6  --   --   --           Microbiology Results (last 7 days)       ** No results found for the last 168 hours. **             Radiology:  CV Ultrasound Bilateral Doppler Carotid  The right internal carotid artery is patent with 50-69% stenosis.  The left internal carotid artery is occluded  Right vertebral artery is patent, left vertebral not well visualized.        Medications:  Scheduled Meds:   atorvastatin  80 mg Oral Daily    carvediloL  6.25 mg Oral BID    enoxparin  40 mg Subcutaneous Q24H (prophylaxis, 1700)    gabapentin  300 mg Oral TID    methocarbamoL  500 mg Oral QID    mupirocin   Nasal BID    NIFEdipine  30 mg Oral Daily    sacubitriL-valsartan  1 tablet Oral BID     Continuous Infusions:   0.9% NaCl   Intravenous Continuous 75 mL/hr at 04/13/25 1222 New Bag at 04/13/25 1222     PRN Meds:.  Current Facility-Administered Medications:     acetaminophen, 650 mg, Oral, Q4H PRN    albuterol-ipratropium, 3 mL, Nebulization, Q6H PRN    ALPRAZolam, 0.5 mg, Oral, TID PRN    aluminum-magnesium hydroxide-simethicone, 30 mL, Oral, QID PRN    ammonium lactate, , Topical (Top), BID PRN    bisacodyL, 10 mg, Rectal, Daily PRN    ceFAZolin (Ancef) IV (PEDS and ADULTS), 2 g, Intravenous, On Call Procedure    dextrose 50%, 12.5 g, Intravenous, PRN    dextrose 50%, 12.5 g, Intravenous, PRN    dextrose 50%, 25 g, Intravenous, PRN    glucagon (human recombinant), 1 mg, Intramuscular, PRN    glucose, 16 g, Oral, PRN    glucose, 24 g, Oral, PRN    hydrALAZINE, 10 mg, Intravenous, Q4H PRN    HYDROmorphone, 1 mg, Intravenous, Q6H PRN    labetalol, 10  mg, Intravenous, QID PRN    melatonin, 9 mg, Oral, Nightly PRN    naloxone, 0.02 mg, Intravenous, PRN    nicotine, 1 patch, Transdermal, Daily PRN    ondansetron, 8 mg, Oral, Q8H PRN    ondansetron, 4 mg, Intravenous, Q8H PRN    oxyCODONE, 30 mg, Oral, Q4H PRN    polyethylene glycol, 17 g, Oral, TID PRN    simethicone, 1 tablet, Oral, QID PRN    sodium chloride 0.9%, 10 mL, Intravenous, PRN    Nutrition:  Nutrition consulted. Most recent weight and BMI monitored-     Measurements:  Wt Readings from Last 1 Encounters:   04/07/25 70.1 kg (154 lb 8.7 oz)   Body mass index is 20.39 kg/m².    Patient has been screened and assessed by RD.    Malnutrition Type:  Context: chronic illness  Level: moderate    Malnutrition Characteristic Summary:  Weight Loss (Malnutrition): other (see comments) (Does not meet criteria)  Energy Intake (Malnutrition): other (see comments) (Does not meet criteria)  Subcutaneous Fat (Malnutrition): mild depletion  Muscle Mass (Malnutrition): mild depletion  Fluid Accumulation (Malnutrition): other (see comments) (Not present)  Hand  Strength, Right (Malnutrition): Unable to assess    Interventions/Recommendations (treatment strategy):  Oral nutritional supplement        Assessment/Plan:   Left internal iliac/common femoral occlusion   Left lower extremity ischemia s/p B AKA 4/11  Distal thoracic aortic thrombus   SMA occlusion   NSTEMI, type 2 demand ischemia   Hypertensive urgency   Acute on chronic kidney disease, unknown baseline   Adrenal adenoma   Tobacco abuse      To OR today for wound formalization  Slight hgb drop today but not low enough for transfusion.  Will get repeat this afternoon vs tomorrow post-op.   Monitoring WBC. Afebrile.   Needs to follow up with Cardiology as an outpatient for stress test.    Suspect that he will require some type of placement secondary to bilateral AKA      Yifan Cottrell MD   04/14/2025     All diagnosis and differential diagnosis have been reviewed;  assessment and plan has been documented; I have personally reviewed the labs and test results that are presently available; I have reviewed the patients medication list; I have reviewed the consulting providers response and recommendations. I have reviewed or attempted to review medical records based upon their availability    All of the patient's questions have been  addressed and answered. Patient's is agreeable to the above stated plan. I will continue to monitor closely and make adjustments to medical management as needed.  _____________________________________________________________________

## 2025-04-14 NOTE — TRANSFER OF CARE
"Anesthesia Transfer of Care Note    Patient: Prasanna Whiting    Procedure(s) Performed: Procedure(s) (LRB):  AMPUTATION, ABOVE KNEE (Bilateral)    Patient location: PACU    Anesthesia Type: general    Transport from OR: Transported from OR on room air with adequate spontaneous ventilation    Post pain: adequate analgesia    Post assessment: no apparent anesthetic complications    Post vital signs: stable    Level of consciousness: awake    Nausea/Vomiting: no nausea/vomiting    Complications: none    Transfer of care protocol was followed    Last vitals: Visit Vitals  /61 (BP Location: Right arm, Patient Position: Lying)   Pulse (!) 55   Temp 36.7 °C (98 °F) (Oral)   Resp 18   Ht 6' 1" (1.854 m)   Wt 70.1 kg (154 lb 8.7 oz)   SpO2 (!) 93%   BMI 20.39 kg/m²     "

## 2025-04-14 NOTE — ANESTHESIA POSTPROCEDURE EVALUATION
Anesthesia Post Evaluation    Patient: Prasanna Whiting    Procedure(s) Performed: Procedure(s) (LRB):  AMPUTATION, ABOVE KNEE (Bilateral)    Final Anesthesia Type: general      Patient location during evaluation: PACU  Patient participation: Yes- Able to Participate  Level of consciousness: awake and alert  Post-procedure vital signs: reviewed and stable  Pain management: adequate  Airway patency: patent      Anesthetic complications: no      Cardiovascular status: hemodynamically stable  Respiratory status: unassisted  Hydration status: euvolemic  Follow-up not needed.              Vitals Value Taken Time   /59 04/14/25 12:20   Temp 37.2 °C (99 °F) 04/14/25 12:00   Pulse 53 04/14/25 12:20   Resp 18 04/14/25 12:00   SpO2 95 % 04/14/25 12:20   Vitals shown include unfiled device data.      Event Time   Out of Recovery 04/14/2025 11:50:00         Pain/Daphney Score: Pain Rating Prior to Med Admin: 7 (4/14/2025 11:50 AM)  Pain Rating Post Med Admin: 1 (4/13/2025 10:27 AM)  Daphney Score: 10 (4/14/2025 11:50 AM)

## 2025-04-14 NOTE — ANESTHESIA PROCEDURE NOTES
Intubation    Date/Time: 4/14/2025 7:53 AM    Performed by: Lan Richardson CRNA  Authorized by: Mehreen Lobato MD    Intubation:     Induction:  Intravenous    Intubated:  Postinduction    Mask Ventilation:  Easy with oral airway    Attempts:  1    Attempted By:  CRNA    Method of Intubation:  Direct    Blade:  Breaux 2    Laryngeal View Grade: Grade I - full view of cords      Difficult Airway Encountered?: No      Complications:  None    Airway Device:  Oral endotracheal tube    Airway Device Size:  7.5    Style/Cuff Inflation:  Cuffed (inflated to minimal occlusive pressure)    Inflation Amount (mL):  10    Tube secured:  22    Secured at:  The lips    Placement Verified By:  Capnometry    Complicating Factors:  None    Findings Post-Intubation:  BS equal bilateral and atraumatic/condition of teeth unchanged

## 2025-04-14 NOTE — ANESTHESIA PREPROCEDURE EVALUATION
04/14/2025  Prasanna Whiting is a 69 y.o., male.hx of hepatitis and HTN who presents to the ED as a transfer from [place] for vascular surgery. Pt states beginning yesterday he began having pain to his left lower extremity and noticed it was cold to the touch so he went to [facility] ED. Ultrasound of the left lower extremity done at [facility] showing arterial occlusion. Pt. had a unsuccessful bilateral lower extremity embolectomy 4/07 which failed to treat ischemia.    Dx since admit:  NSTEMI possibly type II  -Troponin max 0.526, now trending down  Hypertensive emergency  WCT  CMO, unknown etiology at this point  -EF 35-40%  Distal thoracic aortic thrombus  L internal iliac and common femoral occlusion  Bilateral severe PAD  AROLDO on CKD  CAD  HTN  HLD  Adrenal adenoma  Tobacco use  Medical noncompliance      s/p Tanner AKA  Here for formalization    Pre-op Assessment    I have reviewed the Patient Summary Reports.     I have reviewed the Nursing Notes. I have reviewed the NPO Status.   I have reviewed the Medications.     Review of Systems  Cardiovascular:     Hypertension                                    Hypertension         Hepatic/GI:      Liver Disease, Hepatitis           Liver Disease, Hepatitis        Psych:  Psychiatric History                     Anesthesia Plan  Type of Anesthesia, risks & benefits discussed:    Anesthesia Type: Gen ETT  Intra-op Monitoring Plan: Standard ASA Monitors  Post Op Pain Control Plan: multimodal analgesia  Induction:  IV  Airway Plan: Direct, Post-Induction  Informed Consent: Informed consent signed with the Patient and all parties understand the risks and agree with anesthesia plan.  All questions answered. Patient consented to blood products? Yes  ASA Score: 3  Day of Surgery Review of History & Physical: H&P Update referred to the surgeon/provider.    Ready For Surgery From  Anesthesia Perspective.     .

## 2025-04-14 NOTE — BRIEF OP NOTE
[facility] - 9th Floor Medical Telemetry  Surgery Department  Operative Note    SUMMARY     Date of Procedure: 4/14/2025     Procedure: Procedure(s) (LRB):  AMPUTATION, ABOVE KNEE (Bilateral)     Surgeons and Role:     * Buddy Allen MD - Primary     * Elias Chu MD - Resident - Assisting  *Abel Murdock MD- Resident        Pre-Operative Diagnosis: Arterial embolus of lower extremity [I74.3]    Post-Operative Diagnosis: Post-Op Diagnosis Codes:     * Arterial embolus of lower extremity [I74.3]    Anesthesia: General    Operative Findings (including complications, if any): nonviable muscle, bone bilaterally at level of stumps. Nonviable tissue/bone debrided proximally, incisions closed    Description of Technical Procedures: see op note      Estimated Blood Loss (EBL): * No values recorded between 4/14/2025  7:42 AM and 4/14/2025 11:25 AM *           Implants: * No implants in log *    Specimens:   Specimen (24h ago, onward)       Start     Ordered    04/14/25 0912  Specimen to Pathology  RELEASE UPON ORDERING        References:    Click here for ordering Quick Tip   Question:  Release to patient  Answer:  Immediate    04/14/25 0912                   ID Type Source Tests Collected by Time Destination   A :  Bone Femur, Right SPECIMEN TO PATHOLOGY Buddy Allen MD 4/14/2025 0909    B :  Bone Femur, Left SPECIMEN TO PATHOLOGY Buddy Allen MD 4/14/2025 0950               Condition: Good    Disposition: PACU - hemodynamically stable.    Attestation: Op Note Attestation: The attending physician was present and scrubbed for the entire procedure.    Elias Chu MD  [facility] General Surgery, PGY-2  04/14/2025

## 2025-04-15 NOTE — PLAN OF CARE
Problem: Adult Inpatient Plan of Care  Goal: Plan of Care Review  Outcome: Not Progressing  Goal: Patient-Specific Goal (Individualized)  Outcome: Not Progressing  Goal: Absence of Hospital-Acquired Illness or Injury  Outcome: Not Progressing  Goal: Optimal Comfort and Wellbeing  Outcome: Not Progressing  Goal: Readiness for Transition of Care  Outcome: Not Progressing     Problem: Infection  Goal: Absence of Infection Signs and Symptoms  Outcome: Not Progressing     Problem: Skin Injury Risk Increased  Goal: Skin Health and Integrity  Outcome: Not Progressing     Problem: Wound  Goal: Optimal Coping  Outcome: Not Progressing  Goal: Optimal Functional Ability  Outcome: Not Progressing  Goal: Absence of Infection Signs and Symptoms  Outcome: Not Progressing  Goal: Improved Oral Intake  Outcome: Not Progressing  Goal: Optimal Pain Control and Function  Outcome: Not Progressing  Goal: Skin Health and Integrity  Outcome: Not Progressing  Goal: Optimal Wound Healing  Outcome: Not Progressing     Problem: Fall Injury Risk  Goal: Absence of Fall and Fall-Related Injury  Outcome: Not Progressing     Problem: Coping Ineffective  Goal: Effective Coping  Outcome: Not Progressing

## 2025-04-15 NOTE — PROGRESS NOTES
[facility]  Hospital Medicine Progress Note        Chief Complaint: Inpatient Follow-up     HPI:   69-year-old male with no known medical condition as he states he has not seen a physician in 4 years. Prior to that he says he took a proximally 15 medications on a daily basis but has not taken anything for the last 4 years. Patient presents today with complaint of left lower extremity burning that started yesterday.      Vascular surgery evaluated.  Performed angiography.  He has bilateral lower extremity atherosclerotic disease along with acute and subacute thromboembolism.  That has no options for mean for revascularization.  The recommending likely amputation.  General surgery consulted.  Plan for bilateral AKA on 04/11.  Surgery went as expected.  Returned to the floor in stable condition on 4/11. Patient taken back to the OR on 4/14 for formalization of stumps.       Interval Hx:  Doing ok this morning.  Still with pain but does not appear to be in any distress.  Discussed with palliative yesterday and added amitriptyline for neurogenic pain.  A little groggy but communicative and pleasant      Objective/physical exam:  General: In no acute distress, afebrile  Chest: Clear to auscultation bilaterally  Heart: RRR, +S1, S2, no appreciable murmur  Abdomen: Soft, nontender, BS +  MSK:  s/p B AKA  Neurologic: Alert and oriented x4, Cranial nerve II-XII intact, Strength 5/5 in all 4 extremities       VITAL SIGNS: 24 HRS MIN & MAX LAST   Temp  Min: 98 °F (36.7 °C)  Max: 99.3 °F (37.4 °C) 98.9 °F (37.2 °C)   BP  Min: 114/61  Max: 161/61 (!) 146/65   Pulse  Min: 46  Max: 60  60   Resp  Min: 14  Max: 20 20   SpO2  Min: 93 %  Max: 100 % (!) 93 %       Recent Labs   Lab 04/11/25  0610 04/12/25  0857 04/13/25  0512 04/13/25  1219   WBC 11.41 14.45* 16.95*  --    RBC 4.75 4.32* 4.13*  --    HGB 12.9* 11.5* 11.3* 9.9*   HCT 38.8* 35.5* 33.8* 30.8*   MCV 81.7 82.2 81.8  --    MCH 27.2 26.6* 27.4   --    MCHC 33.2 32.4* 33.4  --    RDW 15.6 15.8 15.9  --     248 226  --    MPV 10.2 10.5* 10.9*  --        Recent Labs   Lab 04/09/25  0523 04/10/25  0621 04/11/25  0610 04/12/25  0857 04/13/25  0512   * 129* 132* 128* 130*   K 4.3 3.7 3.9 4.2 3.9   CL 96* 94* 94* 95* 94*   CO2 18* 22* 27 20* 24   BUN 20.1 26.3* 23.8 21.8 32.5*   CREATININE 1.19 1.18 1.18 1.15 1.20   CALCIUM 9.2 8.6* 8.7* 9.0 9.2   MG 2.20 2.30  --   --   --    ALBUMIN 4.0 3.6  --   --   --    ALKPHOS 51 47  --   --   --    ALT 6 9  --   --   --    AST 29 39  --   --   --    BILITOT 0.8 0.6  --   --   --           Microbiology Results (last 7 days)       ** No results found for the last 168 hours. **             Radiology:  CV Ultrasound Bilateral Doppler Carotid  The right internal carotid artery is patent with 50-69% stenosis.  The left internal carotid artery is occluded  Right vertebral artery is patent, left vertebral not well visualized.        Medications:  Scheduled Meds:   amitriptyline  25 mg Oral QHS    atorvastatin  80 mg Oral Daily    carvediloL  6.25 mg Oral BID    enoxparin  40 mg Subcutaneous Q24H (prophylaxis, 1700)    gabapentin  300 mg Oral TID    methocarbamoL  500 mg Oral QID    mupirocin   Nasal BID    NIFEdipine  30 mg Oral Daily    sacubitriL-valsartan  1 tablet Oral BID     Continuous Infusions:   0.9% NaCl   Intravenous Continuous 75 mL/hr at 04/13/25 1222 New Bag at 04/13/25 1222     PRN Meds:.  Current Facility-Administered Medications:     acetaminophen, 650 mg, Oral, Q4H PRN    albuterol-ipratropium, 3 mL, Nebulization, Q6H PRN    ALPRAZolam, 0.5 mg, Oral, TID PRN    aluminum-magnesium hydroxide-simethicone, 30 mL, Oral, QID PRN    ammonium lactate, , Topical (Top), BID PRN    bisacodyL, 10 mg, Rectal, Daily PRN    ceFAZolin (Ancef) IV (PEDS and ADULTS), 2 g, Intravenous, On Call Procedure    dextrose 50%, 12.5 g, Intravenous, PRN    dextrose 50%, 12.5 g, Intravenous, PRN    dextrose 50%, 12.5 g,  Intravenous, PRN    dextrose 50%, 25 g, Intravenous, PRN    glucagon (human recombinant), 1 mg, Intramuscular, PRN    glucagon (human recombinant), 1 mg, Intramuscular, PRN    glucose, 16 g, Oral, PRN    glucose, 24 g, Oral, PRN    hydrALAZINE, 10 mg, Intravenous, Q4H PRN    HYDROmorphone, 0.4 mg, Intravenous, Q5 Min PRN    labetalol, 10 mg, Intravenous, QID PRN    melatonin, 9 mg, Oral, Nightly PRN    naloxone, 0.02 mg, Intravenous, PRN    nicotine, 1 patch, Transdermal, Daily PRN    ondansetron, 8 mg, Oral, Q8H PRN    ondansetron, 4 mg, Intravenous, Q8H PRN    oxyCODONE, 30 mg, Oral, Q4H PRN    polyethylene glycol, 17 g, Oral, TID PRN    simethicone, 1 tablet, Oral, QID PRN    sodium chloride 0.9%, 10 mL, Intravenous, PRN    sodium chloride 0.9%, 10 mL, Intravenous, PRN    Nutrition:  Nutrition consulted. Most recent weight and BMI monitored-     Measurements:  Wt Readings from Last 1 Encounters:   04/07/25 70.1 kg (154 lb 8.7 oz)   Body mass index is 20.39 kg/m².    Patient has been screened and assessed by RD.    Malnutrition Type:  Context: chronic illness  Level: moderate    Malnutrition Characteristic Summary:  Weight Loss (Malnutrition): other (see comments) (Does not meet criteria)  Energy Intake (Malnutrition): other (see comments) (Does not meet criteria)  Subcutaneous Fat (Malnutrition): mild depletion  Muscle Mass (Malnutrition): mild depletion  Fluid Accumulation (Malnutrition): other (see comments) (Not present)  Hand  Strength, Right (Malnutrition): Unable to assess    Interventions/Recommendations (treatment strategy):  Oral nutritional supplement        Assessment/Plan:   Left internal iliac/common femoral occlusion   Left lower extremity ischemia s/p B AKA 4/11, stump formalization 4/14  Distal thoracic aortic thrombus   SMA occlusion   NSTEMI, type 2 demand ischemia , resolved  Hypertensive urgency   Acute on chronic kidney disease, unknown baseline   Adrenal adenoma   Tobacco abuse       Continue to titrate pain regimen as needed  Bowel regimen in place  Follow up labs this morning.  Monitoring hgb.   NSTEMI resolved and BP better.  OK to dc tele  Needs outpatient stress test per cardiology   PT/OT when ok with surgery.       Yifan Cottrell MD   04/15/2025     All diagnosis and differential diagnosis have been reviewed; assessment and plan has been documented; I have personally reviewed the labs and test results that are presently available; I have reviewed the patients medication list; I have reviewed the consulting providers response and recommendations. I have reviewed or attempted to review medical records based upon their availability    All of the patient's questions have been  addressed and answered. Patient's is agreeable to the above stated plan. I will continue to monitor closely and make adjustments to medical management as needed.  _____________________________________________________________________

## 2025-04-15 NOTE — NURSING
"EMR reviewed to OR 4/14/2025 for formalization of bilateral AKA.  Report received from patient nurse. Patient awake and alert, states he is having "phantom' pain". Bilateral AKA with dressing intact. Elevated on pillows. Will defer to surgery for dressing changes, reconsult wound care if needed.  "

## 2025-04-15 NOTE — PROGRESS NOTES
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 We discussed the patient's goals of care and treatment preferences.        Spoke with patient at bedside. Appears more groggy this afternoon, but comfortable. He tells me he is still having some pain. Unable to participate much more in conversation.     Case discussed with attending MD>    Recommendations:     Decrease gabapentin to BID to see if this helps with lethargy. Continue current pain regimen.      Interval History:     No acute events overnight. No acute distress. Appears to be more groggy this afternoon. He has not had to receive any IV dilaudid or oxycodone.       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    Bilateral arm weakness 07/26/2019    S/P cervical spinal fusion 12/18/2019     Resolved Ambulatory Problems     Diagnosis Date Noted    No Resolved Ambulatory Problems     Past Medical History:   Diagnosis Date    Hepatitis     Hypertension     Mental disorder         Past Surgical History:   Procedure Laterality Date    ABOVE-KNEE AMPUTATION Bilateral 4/11/2025    Procedure: AMPUTATION, ABOVE KNEE;  Surgeon: Bogdan Goldman MD;  Location: [facility];  Service: General;  Laterality: Bilateral;    ABOVE-KNEE AMPUTATION Bilateral 4/14/2025    Procedure: AMPUTATION, ABOVE KNEE;  Surgeon:  Buddy Allen MD;  Location: [facility] OR;  Service: General;  Laterality: Bilateral;    EMBOLECTOMY, LOWER EXTREMITY Bilateral 4/7/2025    Procedure: EMBOLECTOMY, LOWER EXTREMITY;  Surgeon: Anjum Lucero III, MD;  Location: [facility] OR;  Service: Peripheral Vascular;  Laterality: Bilateral;  BILAT LOWER EXTREMITY EMBOLECTOMY    FRACTURE SURGERY      SPINE SURGERY  09/04/2019    ACDF @ C3/4, 09/04/19, WKN, DR. SIN.    SPINE SURGERY  10/18/2019    C3-6 DCL, [facility]SU, DR. SIN.        Review of patient's allergies indicates:  No Known Allergies     Current Medications[1]       Current Facility-Administered Medications:     acetaminophen, 650 mg, Oral, Q4H PRN    albuterol-ipratropium, 3 mL, Nebulization, Q6H PRN    ALPRAZolam, 0.5 mg, Oral, TID PRN    aluminum-magnesium hydroxide-simethicone, 30 mL, Oral, QID PRN    ammonium lactate, , Topical (Top), BID PRN    bisacodyL, 10 mg, Rectal, Daily PRN    ceFAZolin (Ancef) IV (PEDS and ADULTS), 2 g, Intravenous, On Call Procedure    dextrose 50%, 12.5 g, Intravenous, PRN    dextrose 50%, 12.5 g, Intravenous, PRN    dextrose 50%, 12.5 g, Intravenous, PRN    dextrose 50%, 25 g, Intravenous, PRN    glucagon (human recombinant), 1 mg, Intramuscular, PRN    glucagon (human recombinant), 1 mg, Intramuscular, PRN    glucose, 16 g, Oral, PRN    glucose, 24 g, Oral, PRN    hydrALAZINE, 10 mg, Intravenous, Q4H PRN    HYDROmorphone, 0.4 mg, Intravenous, Q5 Min PRN    labetalol, 10 mg, Intravenous, QID PRN    melatonin, 9 mg, Oral, Nightly PRN    naloxone, 0.02 mg, Intravenous, PRN    nicotine, 1 patch, Transdermal, Daily PRN    ondansetron, 8 mg, Oral, Q8H PRN    ondansetron, 4 mg, Intravenous, Q8H PRN    oxyCODONE, 30 mg, Oral, Q4H PRN    polyethylene glycol, 17 g, Oral, TID PRN    simethicone, 1 tablet, Oral, QID PRN    sodium chloride 0.9%, 10 mL, Intravenous, PRN    sodium chloride 0.9%, 10 mL, Intravenous, PRN     Family History   Problem Relation Name Age of Onset    Heart disease  "Mother      Cancer Father            Review of Systems   Musculoskeletal:         Pain to lower extremities            Objective:   /76 (BP Location: Right arm, Patient Position: Lying)   Pulse 62   Temp 99.4 °F (37.4 °C) (Oral)   Resp 18   Ht 6' 1" (1.854 m)   Wt 70.1 kg (154 lb 8.7 oz)   SpO2 (!) 90%   BMI 20.39 kg/m²      Physical Exam   Constitutional: He appears lethargic. He appears ill.   HENT:   Head: Normocephalic and atraumatic.   Cardiovascular: Normal rate. Pulmonary:      Effort: Pulmonary effort is normal.     Musculoskeletal:      Right Lower Extremity: Right leg is amputated above knee.      Left Lower Extremity: Left leg is amputated above knee.   Neurological: He appears lethargic.            Review of Symptoms      Symptom Assessment (ESAS 0-10 Scale)  Pain:  0  Dyspnea:  0  Anxiety:  0  Nausea:  0  Depression:  0  Anorexia:  0  Fatigue:  0  Insomnia:  0  Restlessness:  0  Agitation:  0         Living Arrangements:  Lives with family    Psychosocial/Cultural:   See Palliative Psychosocial Note: Yes  **Primary  to Follow**  Palliative Care  Consult: No      Advance Care Planning   Advance Directives:     Decision Making:  Patient answered questions  Goals of Care: What is most important right now is to focus on curative/life-prolongation (regardless of treatment burdens). Accordingly, we have decided that the best plan to meet the patient's goals includes continuing with treatment.          PAINAD: NA    Caregiver burden formerly assessed: Yes      No results displayed because visit has over 200 results.               > 50% of 25 min of encounter was spent in chart review, face to face discussion of goals of care, symptom assessment, coordination of care and emotional support.    KAY Reis  Palliative Medicine  [facility]         [1]   Current Facility-Administered Medications:     acetaminophen tablet 650 mg, 650 mg, Oral, Q4H PRN, " Reyes, Thairy G, DO    albuterol-ipratropium 2.5 mg-0.5 mg/3 mL nebulizer solution 3 mL, 3 mL, Nebulization, Q6H PRN, Reyes, Thairy G, DO    ALPRAZolam tablet 0.5 mg, 0.5 mg, Oral, TID PRN, Deng Sheehan MD, 0.5 mg at 04/12/25 0013    aluminum-magnesium hydroxide-simethicone 200-200-20 mg/5 mL suspension 30 mL, 30 mL, Oral, QID PRN, Reyes, Thairy G, DO    amitriptyline tablet 25 mg, 25 mg, Oral, QHS, Abi Wharton, FNP, 25 mg at 04/14/25 2123    ammonium lactate 12 % lotion, , Topical (Top), BID PRN, Reyes, Thairy G,     atorvastatin tablet 80 mg, 80 mg, Oral, Daily, Reyes, Thairy G, DO, 80 mg at 04/15/25 1045    bisacodyL suppository 10 mg, 10 mg, Rectal, Daily PRN, Reyes, Thairy G,     carvediloL tablet 6.25 mg, 6.25 mg, Oral, BID, Yogi Schmitz, AGAP-BC, 6.25 mg at 04/15/25 1046    cefazolin (ANCEF) 2 gram in dextrose 5% 50 mL IVPB (premix), 2 g, Intravenous, On Call Procedure, Buddy Allen MD    dextrose 50% injection 12.5 g, 12.5 g, Intravenous, PRN, Reyes, Thairy G,     dextrose 50% injection 12.5 g, 12.5 g, Intravenous, PRN, Pete Tatum MD    dextrose 50% injection 12.5 g, 12.5 g, Intravenous, PRN, Mehreen Lobato MD    dextrose 50% injection 25 g, 25 g, Intravenous, PRN, Reyes, Thairy G, DO    electrolytes-dextrose (Pedialyte) oral solution 400 mL, 400 mL, Oral, Q4H, Yifan Cottrell MD, 400 mL at 04/15/25 1210    enoxaparin injection 40 mg, 40 mg, Subcutaneous, Q24H (prophylaxis, 1700), Yifan Cottrell MD, 40 mg at 04/14/25 1707    gabapentin capsule 300 mg, 300 mg, Oral, TID, Vonnie Grullon MD, 300 mg at 04/15/25 1046    glucagon (human recombinant) injection 1 mg, 1 mg, Intramuscular, PRN, Reyes, Thairy G, DO    glucagon (human recombinant) injection 1 mg, 1 mg, Intramuscular, PRN, Mehreen Lobato MD    glucose chewable tablet 16 g, 16 g, Oral, PRN, Reyes, Thairy G, DO    glucose chewable tablet 24 g, 24 g, Oral, PRN, Reyes, Thairy G, DO    hydrALAZINE  injection 10 mg, 10 mg, Intravenous, Q4H PRN, Yifan Cottrell MD, 10 mg at 04/12/25 0431    HYDROmorphone (PF) injection 0.4 mg, 0.4 mg, Intravenous, Q5 Min PRN, Mehreen Lobato MD, 0.4 mg at 04/14/25 1150    labetaloL injection 10 mg, 10 mg, Intravenous, QID PRN, Reyes, Thairy G, DO, 10 mg at 04/08/25 0138    melatonin tablet 9 mg, 9 mg, Oral, Nightly PRN, Reyes, Thairy G,     methocarbamoL tablet 500 mg, 500 mg, Oral, QID, YadiVonnie pichardo MD, 500 mg at 04/15/25 1212    mupirocin 2 % ointment, , Nasal, BID, Yifan Cottrell MD, Given at 04/15/25 1047    naloxone 0.4 mg/mL injection 0.02 mg, 0.02 mg, Intravenous, PRN, Reyes, Thairy G, DO    nicotine 14 mg/24 hr 1 patch, 1 patch, Transdermal, Daily PRN, Reyes, Thairy G, DO    NIFEdipine 24 hr tablet 30 mg, 30 mg, Oral, Daily, Reyes, Thairy G, , 30 mg at 04/15/25 1046    ondansetron disintegrating tablet 8 mg, 8 mg, Oral, Q8H PRN, Reyes, Thairy G,     ondansetron injection 4 mg, 4 mg, Intravenous, Q8H PRN, Reyes, Thairy G, DO, 4 mg at 04/08/25 0149    oxyCODONE immediate release tablet Tab 30 mg, 30 mg, Oral, Q4H PRN, Yifan Cottrell MD, 30 mg at 04/15/25 0351    polyethylene glycol packet 17 g, 17 g, Oral, TID PRN, Reyes, Thairy G, DO    sacubitriL-valsartan 24-26 mg per tablet 1 tablet, 1 tablet, Oral, BID, Yogi Schmitz, AGACNP-BC, 1 tablet at 04/15/25 1045    simethicone chewable tablet 80 mg, 1 tablet, Oral, QID PRN, Reyes, Thairy G, DO    sodium chloride 0.9% flush 10 mL, 10 mL, Intravenous, PRN, Reyes, Thairy G, DO    sodium chloride 0.9% flush 10 mL, 10 mL, Intravenous, PRN, Mehreen Lobato MD

## 2025-04-16 NOTE — H&P
[facility] - [facility] ICU  Pulmonary Critical Care Note      Patient Name: Prasanna Whiting  MRN: [id number]  Admission Date: 4/6/2025  Hospital Length of Stay: 10 days  Code Status: DNR  Attending Provider: Rick Gomez Jr., MD,*  Primary Care Provider: Mary, Primary Doctor     Subjective:     HPI: Prasanna Whiting is a 69 y.o. 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  Hospital medicine consulted ICU for upgrade with patient having hypo tension and desaturation.  Patient was upgraded to ICU but had became pulseless while in the elevator while bring in the patient to ICU. Patient underwent multiple rounds of chest compressions, epinephrine and was able to achieve Rosc.  Spoke with patient's daughter who stated that his wishes  DNR/DNI.       Hospital Course/Significant events:     Cardiac arrest x2      Past Medical History:   Diagnosis Date    Contusion of cervical cord     Hepatitis     Hypertension     Mental disorder        Past Surgical History:   Procedure Laterality Date    ABOVE-KNEE AMPUTATION Bilateral 4/11/2025    Procedure: AMPUTATION, ABOVE KNEE;  Surgeon: Bogdan Goldman MD;  Location: [facility] OR;  Service: General;  Laterality: Bilateral;    ABOVE-KNEE AMPUTATION Bilateral 4/14/2025    Procedure: AMPUTATION, ABOVE KNEE;  Surgeon: Buddy Allen MD;  Location: [facility] OR;  Service: General;  Laterality: Bilateral;    EMBOLECTOMY, LOWER EXTREMITY Bilateral 4/7/2025    Procedure: EMBOLECTOMY, LOWER EXTREMITY;  Surgeon: Anjum Lucero III, MD;  Location: [facility] OR;  Service: Peripheral Vascular;  Laterality: Bilateral;  BILAT LOWER EXTREMITY EMBOLECTOMY    FRACTURE SURGERY      SPINE SURGERY  09/04/2019    ACDF @ C3/4, 09/04/19, WKN, DR. SIN.    SPINE SURGERY   10/18/2019    C3-6 DCL, [facility], DR. SIN.       Review of patient's allergies indicates:  No Known Allergies    Current Outpatient Medications   Medication Instructions    albuterol (PROVENTIL/VENTOLIN HFA) 90 mcg/actuation inhaler No dose, route, or frequency recorded.    ATROVENT HFA 17 mcg/actuation inhaler No dose, route, or frequency recorded.    baclofen (LIORESAL) 20 MG tablet No dose, route, or frequency recorded.    DULoxetine (CYMBALTA) 30 MG capsule No dose, route, or frequency recorded.    enalapril (VASOTEC) 20 MG tablet No dose, route, or frequency recorded.    finasteride (PROSCAR) 5 mg tablet No dose, route, or frequency recorded.    ibuprofen (ADVIL,MOTRIN) 100 mg/5 mL suspension No dose, route, or frequency recorded.    omeprazole (PRILOSEC) 40 MG capsule No dose, route, or frequency recorded.    predniSONE (DELTASONE) 20 MG tablet No dose, route, or frequency recorded.    rosuvastatin (CRESTOR) 20 MG tablet No dose, route, or frequency recorded.    VITAMIN D2 50,000 unit capsule No dose, route, or frequency recorded.        Social History:     Social History[1]    Family History   Problem Relation Name Age of Onset    Heart disease Mother      Cancer Father         ROS   Objective:     Vital Signs (Most Recent):  Temp: 98.7 °F (37.1 °C) (04/16/25 1114)  Pulse: 66 (04/16/25 1114)  Resp: 20 (04/16/25 1114)  BP: 112/60 (04/16/25 1114)  SpO2: (!) 79 % (pt non-responsive to attempts to wake up, RN notified immediately) (04/16/25 1300)  Body mass index is 20.39 kg/m².  Weight: 70.1 kg (154 lb 8.7 oz) Vital Signs (24h Range):  Temp:  [98.3 °F (36.8 °C)-99.4 °F (37.4 °C)] 98.7 °F (37.1 °C)  Pulse:  [58-68] 66  Resp:  [16-20] 20  SpO2:  [79 %-94 %] 79 %  BP: (110-153)/(60-75) 112/60     Input/output::     Intake/Output Summary (Last 24 hours) at 4/16/2025 1450  Last data filed at 4/16/2025 0933  Gross per 24 hour   Intake 740 ml   Output 1400 ml   Net -660 ml       Physical Exam:   GEN:  Intubated and  sedated  HEENT:  ET tube present  CARDIO: regular rate, regular rhythm, normal S1, S2, no murmurs, rubs, clicks or gallops   PULM/CHEST: clear to auscultation bilaterally, no wheezes, rales or rhonchi, symmetric air entry, no accessory muscle use or distress  ABDOMEN: + BS, soft, non tender, non-distended abdomen  NEURO:  Intubated and sedated  MSK :  Status post BKA, mottled skin changes noted      Significant Labs:    Laboratory Studies:   Recent Labs   Lab 04/16/25  1336   PH 7.120*   PCO2 57.0*   PO2 46.0*   HCO3 18.5*     Recent Labs   Lab 04/16/25  0806   WBC 13.62*   RBC 3.09*   HGB 8.3*   HCT 25.1*      MCV 81.2   MCH 26.9*   MCHC 33.1     Recent Labs   Lab 04/16/25  0806   GLUCOSE 132*   *   K 4.7   CO2 22*   BUN 40.6*   CREATININE 1.35*   CALCIUM 7.9*           ABGs:    Recent Labs   Lab 04/16/25  1336   PH 7.120*   PO2 46.0*   PCO2 57.0*   HCO3 18.5*   POCBASEDEF -10.10*       Lines/Drains/Airways       Drain  Duration                  Urethral Catheter 04/12/25 0622 [place] 16 Fr. 4 days         NG/OG Tube 04/16/25 1349 [place] sump 16 Fr. Center mouth;Right mouth <1 day                     Vent:       Current Medications:     Infusions:        Scheduled:   amitriptyline  25 mg Oral QHS    atorvastatin  80 mg Oral Daily    carvediloL  6.25 mg Oral BID    electrolytes-dextrose  400 mL Oral Q4H    enoxparin  40 mg Subcutaneous Q24H (prophylaxis, 1700)    gabapentin  300 mg Oral BID    methocarbamoL  500 mg Oral QID    mupirocin   Nasal BID    NIFEdipine  30 mg Oral Daily    sacubitriL-valsartan  1 tablet Oral BID         PRN:   amitriptyline tablet 25 mg    atorvastatin tablet 80 mg    carvediloL tablet 6.25 mg    electrolytes-dextrose (Pedialyte) oral solution 400 mL    enoxaparin injection 40 mg    gabapentin capsule 300 mg    methocarbamoL tablet 500 mg    mupirocin 2 % ointment    NIFEdipine 24 hr tablet 30 mg    sacubitriL-valsartan 24-26 mg per tablet 1 tablet        Microbiology  "Data:  Microbiology Results (last 7 days)       ** No results found for the last 168 hours. **             Antibiotics and Day Number of Therapy:  Antibiotics (From admission, onward)      Start     Stop Route Frequency Ordered    04/14/25 0511  cefazolin (ANCEF) 2 gram in dextrose 5% 50 mL IVPB (premix)         -- IV On Call Procedure 04/14/25 0512    04/12/25 0900  mupirocin 2 % ointment         04/17/25 0859 Nasl 2 times daily 04/12/25 0723             Estimated Creatinine Clearance: 51.9 mL/min (A) (based on SCr of 1.35 mg/dL (H)).    Imaging:  X-Ray Chest 1 View  Narrative: EXAMINATION:  XR CHEST 1 VIEW    CLINICAL HISTORY:  Increased O2 reqs;, Embolism and thrombosis of arteries of the lower extremities.    COMPARISON:  August 19, 2024    FINDINGS:  No alveolar consolidation, effusion, or pneumothorax is seen.   The thoracic aorta is normal  cardiac silhouette, central pulmonary vessels and mediastinum are normal in size and are grossly unremarkable.   visualized osseous structures are grossly unremarkable.  Impression: No acute chest disease is identified.    Electronically signed by: Patrick Biggs  Date:    04/16/2025  Time:    08:41        2D ECHO Results    Results for orders placed during the hospital encounter of 04/06/25    Echo    Interpretation Summary    Left Ventricle: The left ventricle is normal in size. There is concentric remodeling. Moderate global hypokinesis present. Septal motion is abnormal is consistent with bundle branch block. There is moderately reduced systolic function with a visually estimated ejection fraction of 30 - 40%. Grade I diastolic dysfunction.    Right Ventricle: The right ventricle is normal in size. Systolic function is normal.    Left Atrium: Mildly dilated    Aortic Valve: Aortic valve peak velocity is 1.1 m/s. Mean gradient is 3 mmHg.      Pulmonary Functions Testing Results:    No results found for: "FEV1", "FVC", "DPK5KMW", "TLC", "DLCO"      Assessment/Plan: "     Assessment:  Cardiac arrest likely from septic shock with the aspiration  Critical limb ischemia status post bilateral AKA on 04/11, formalization/closure on 04/14   Left internal iliac/common femoral occlusion  Distal thoracic aortic thrombus, SMA occlusion   Acute on chronic kidney disease      Plan:  Patient went into cardiac arrest x2 and Rosc achieved after multiple rounds of chest compressions and epinephrine will times.  Spoke with patient's daughter in detail who stated that patient's wishes were always to not have any intubation or chest compressions and thus modified code status to DNR/DNI.                Keep HOB elevated > 30°  Maintain blood glucose levels 140-180    32 minutes of critical care was time spent personally by me on the following activities: development of treatment plan with patient or surrogate and bedside caregivers, discussions with consultants, evaluation of patient's response to treatment, examination of patient, ordering and performing treatments and interventions, ordering and review of laboratory studies, ordering and review of radiographic studies, pulse oximetry, re-evaluation of patient's condition.  This critical care time did not overlap with that of any other provider or involve time for any procedures.     Pasha Santiago,   Pulmonary/Critical Care  [facility] - [facility]        [1]   Social History  Socioeconomic History    Marital status: Single   Tobacco Use    Smoking status: Every Day     Current packs/day: 0.50     Types: Cigarettes    Smokeless tobacco: Never   Substance and Sexual Activity    Alcohol use: Never    Drug use: Yes     Social Drivers of Health     Financial Resource Strain: Low Risk  (4/8/2025)    Overall Financial Resource Strain (CARDIA)     Difficulty of Paying Living Expenses: Not very hard   Food Insecurity: No Food Insecurity (4/8/2025)    Hunger Vital Sign     Worried About Running Out of Food in the Last Year: Never true      Ran Out of Food in the Last Year: Never true   Transportation Needs: No Transportation Needs (4/8/2025)    PRAPARE - Transportation     Lack of Transportation (Medical): No     Lack of Transportation (Non-Medical): No   Physical Activity: Inactive (4/8/2025)    Exercise Vital Sign     Days of Exercise per Week: 0 days     Minutes of Exercise per Session: 0 min   Stress: No Stress Concern Present (4/8/2025)    [employer] - Occupational Stress Questionnaire     Feeling of Stress : Not at all   Housing Stability: Low Risk  (4/8/2025)    Housing Stability Vital Sign     Unable to Pay for Housing in the Last Year: No     Homeless in the Last Year: No

## 2025-04-16 NOTE — DISCHARGE SUMMARY
[facility]  Pulmonology/Critical Care  Discharge Summary      Patient Name: Prasanna Whiting  MRN: [id number]  Admission Date: 4/6/2025  Hospital Length of Stay: 10 days  Discharge Date and Time: Patient death pronounced at 14:58 by Dr. Pasha Santiago    Reason for admission: Arterial embolus of lower extremity    Primary (Principal), Secondary, Final Diagnoses:  1. Arterial embolus of lower extremity    2. Thrombosis of thoracic aorta    3. Occlusion of superior mesenteric artery    4. Chest pain    5. NSTEMI (non-ST elevated myocardial infarction)    6. Carotid artery stenosis    7. 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 Goals of care, counseling/discussion    9. 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 Ischemic rest pain of lower extremity      Procedures performed: AMPUTATION, ABOVE KNEE (Bilateral)    Care, treatment & services provided:    Orders Placed This Encounter   Procedures    Critical Care    PACK PUP LEONARDO    Standard Frame w Pulsate Mattress    Vashe    CTA Runoff ABD PEL Bilat Lower Ext    X-Ray Chest 1 View    Comprehensive metabolic panel    CBC auto differential    CBC with Differential    APTT    Protime-INR    APTT    APTT    Troponin I    Urinalysis, Reflex to Urine Culture    Troponin I    PTT Heparin Monitoring    CBC Auto Differential    Magnesium    Comprehensive Metabolic Panel    PTT Heparin Monitoring    PTT Heparin Monitoring    CBC with Differential    PTT Heparin Monitoring    CBC Auto Differential    Magnesium    Comprehensive Metabolic Panel    PTT Heparin Monitoring    CBC with Differential    PTT Heparin Monitoring    PTT Heparin Monitoring    Basic Metabolic Panel    CBC Auto Differential    PTT Heparin Monitoring    CBC with Differential    PTT Heparin Monitoring    CBC Auto Differential    Basic Metabolic Panel    CBC Auto Differential    Basic Metabolic Panel    CBC with Differential    CBC with Differential    Hemoglobin  and Hematocrit    CBC Auto Differential    Basic Metabolic Panel    CBC with Differential    Manual Differential    Iron and TIBC    Folate    Vitamin B12    CBC Auto Differential    Basic Metabolic Panel    CBC Auto Differential    Basic Metabolic Panel    CBC with Differential    RT Blood Gas    Diet Adult Regular    Vital signs    Strict intake and output (1) Document % meals taken (2) # of urinations (3) # and consistency of BMs    During the day, please open the shades and turn on the lights- If patient is in private room, please make sure they are close to the window.    Make sure the correct date and time is written on the whiteboard, as well as the current care team    Minimize interruptions at night-time, close shades and turn off TV.    When possible, during daytime make sure patient is in chair and provide activities that engage patient.    Bladder scan    Straight Cath    Notify Provider    Place sequential compression device    Recheck Blood Glucose:    Skin assessment    Moisture Management    Sacral Protection    Elevate heels off of bed    Check Medical Devices for Pressure    Notify Anesthesiologist if Patient on Home Insulin Pump    Admit to Phase 1 PACU, transfer to Phase 2 per protocol when indicated     Apply warming blanket    Admit to Phase 1 PACU, transfer to Phase 2 per protocol when indicated     Re-check Blood Glucose    Apply warming blanket    Notify Physician - Potential Need of Opioid Reversal    maintenance fluids per service    Rodriguez to [place]    Rodriguez Catheter Care every 12 hours    Nurse to discontinue rodriguez when patient no longer meets criteria    Post Rodriguez Catheter Removal Protocol    Tele: Maintain IV access while on telemetry - Adult    Admit to Phase 1 PACU, transfer to Phase 2 per protocol when indicated     Re-check Blood Glucose    Intake and output Per protocol    Apply warming blanket    Notify Anesthesiologist    Notify Physician - Potential Need of Opioid Reversal     Encourage oral hydration    DNR (Do Not Resuscitate)    Inpatient consult to Vascular Surgery    Inpatient consult to Cardiology    IP Wound Care consult Nurse    Inpatient consult to Podiatry    Inpatient consult to General Surgery    Inpatient consult to Palliative Care    IP Wound Care consult Nurse    Dietary nutrition supplements All Meals; Boost Plus Nutritional Drink - Rich Chocolate    Dietary nutrition supplements BID; Elijah - Any flavor    Inhalation Treatment Q6H PRN    Oxygen PRN    Oxygen Continuous    Pulse Oximetry Continuous    CV Ultrasound Bilateral Doppler Carotid    Cardiac monitoring strips    Cardiac monitoring strips    Cardiac monitoring strips    Cardiac monitoring strips    Cardiac monitoring strips    Cardiac monitoring strips    Cardiac monitoring strips    Cardiac monitoring strips    Cardiac monitoring strips    Cardiac monitoring strips    Cardiac monitoring strips    Cardiac monitoring strips    Cardiac monitoring strips    Cardiac monitoring strips    Cardiac monitoring strips    Cardiac monitoring strips    Cardiac monitoring strips    Cardiac monitoring strips    Cardiac monitoring strips    Cardiac monitoring strips    Cardiac monitoring strips    Cardiac monitoring strips    Cardiac monitoring strips    Cardiac monitoring strips    Cardiac monitoring strips    Cardiac monitoring strips    Cardiac monitoring strips    Cardiac monitoring strips    Cardiac monitoring strips    Cardiac monitoring strips    Cardiac monitoring strips    Cardiac monitoring strips    Cardiac monitoring strips    Cardiac monitoring strips    Cardiac monitoring strips    Cardiac monitoring strips    Cardiac monitoring strips    Cardiac monitoring strips    Cardiac monitoring strips    Cardiac monitoring strips    Cardiac monitoring strips    Cardiac monitoring strips    Cardiac monitoring strips    Cardiac monitoring strips    Cardiac monitoring strips    Cardiac monitoring strips    Cardiac monitoring  strips    Cardiac monitoring strips    Cardiac monitoring strips    Cardiac monitoring strips    Cardiac monitoring strips    Cardiac monitoring strips    Cardiac monitoring strips    Cardiac monitoring strips    Cardiac monitoring strips    Cardiac monitoring strips    Cardiac monitoring strips    Cardiac monitoring strips    Cardiac monitoring strips    Cardiac monitoring strips    Cardiac monitoring strips    Cardiac monitoring strips    Cardiac monitoring strips    Cardiac monitoring strips    Cardiac monitoring strips    Cardiac monitoring strips    Cardiac monitoring strips    Cardiac monitoring strips    Cardiac monitoring strips    Cardiac monitoring strips    Cardiac monitoring strips    Cardiac monitoring strips    Cardiac monitoring strips    Cardiac monitoring strips    Cardiac monitoring strips    Cardiac monitoring strips    Cardiac monitoring strips    Cardiac monitoring strips    Cardiac monitoring strips    Cardiac monitoring strips    Cardiac monitoring strips    Cardiac monitoring strips    Cardiac monitoring strips    Cardiac monitoring strips    Cardiac monitoring strips    Cardiac monitoring strips    Cardiac monitoring strips    Cardiac monitoring strips    Cardiac monitoring strips    Cardiac monitoring strips    Cardiac monitoring strips    Cardiac monitoring strips    Cardiac monitoring strips    Cardiac monitoring strips    Cardiac monitoring strips    Cardiac monitoring strips    Cardiac monitoring strips    Cardiac monitoring strips    Cardiac monitoring strips    Cardiac monitoring strips    Cardiac monitoring strips    Cardiac monitoring strips    Cardiac monitoring strips    Cardiac monitoring strips    Cardiac monitoring strips    Cardiac monitoring strips    Cardiac monitoring strips    Cardiac monitoring strips    Cardiac monitoring strips    Cardiac monitoring strips    Cardiac monitoring strips    Cardiac monitoring strips    Cardiac monitoring strips    Cardiac monitoring strips     Cardiac monitoring strips    Cardiac monitoring strips    Cardiac monitoring strips    Cardiac monitoring strips    Cardiac monitoring strips    Cardiac monitoring strips    Cardiac monitoring strips    Cardiac monitoring strips    Cardiac monitoring strips    Cardiac monitoring strips    Cardiac monitoring strips    Cardiac monitoring strips    Cardiac monitoring strips    Cardiac monitoring strips    Cardiac monitoring strips    Cardiac monitoring strips    Cardiac monitoring strips    Cardiac monitoring strips    Cardiac monitoring strips    Cardiac monitoring strips    Cardiac monitoring strips    Cardiac monitoring strips    Cardiac monitoring strips    Cardiac monitoring strips    Cardiac monitoring strips    Cardiac monitoring strips    Cardiac monitoring strips    Cardiac monitoring strips    Cardiac monitoring strips    Cardiac monitoring strips    Cardiac monitoring strips    Cardiac monitoring strips    Cardiac monitoring strips    Cardiac monitoring strips    Cardiac monitoring strips    Cardiac monitoring strips    Cardiac monitoring strips    Cardiac monitoring strips    EKG 12-lead    EKG 12-lead    Echo    Echo    Type & Screen    ABORH RETYPE    Type & Screen    Saline lock IV    Admit to Inpatient    Transfer patient    Fall precautions    Aspiration precautions     Death note: Came to bedside to evaluate patient.  On exam patient has no pupillary response, oculocephalic reflex is not present.  No response to pain in all 4 extremities.  No heart sounds on auscultation.  No peripheral pulses felt.    Cause of Death:  Cardiac arrest with septic shock  Discharged Condition:     Disposition:  in medical facility      Pasha Santiago DO  Pulmonology

## 2025-04-16 NOTE — PROGRESS NOTES
[facility]  Hospital Medicine Progress Note        Chief Complaint: Inpatient Follow-up     HPI:   69-year-old male with no known medical condition as he states he has not seen a physician in 4 years. Prior to that he says he took a proximally 15 medications on a daily basis but has not taken anything for the last 4 years. Patient presents today with complaint of left lower extremity burning that started yesterday.      Vascular surgery evaluated.  Performed angiography.  He has bilateral lower extremity atherosclerotic disease along with acute and subacute thromboembolism.  That has no options for mean for revascularization.  The recommending likely amputation.  General surgery consulted.  Plan for bilateral AKA on 04/11.  Surgery went as expected.  Returned to the floor in stable condition on 4/11. Patient taken back to the OR on 4/14 for formalization of stumps.       Interval Hx:  No significant changes overnight.  Resting comfortably in bed.  Still little lethargic but arousable.  Pain is controlled      Objective/physical exam:  General: In no acute distress, afebrile  Chest: Clear to auscultation bilaterally  Heart: RRR, +S1, S2, no appreciable murmur  Abdomen: Soft, nontender, BS +  MSK:  s/p B AKA  Neurologic: Alert and oriented x4, Cranial nerve II-XII intact, Strength 5/5 in all 4 extremities    VITAL SIGNS: 24 HRS MIN & MAX LAST   Temp  Min: 98.3 °F (36.8 °C)  Max: 99.6 °F (37.6 °C) 98.3 °F (36.8 °C)   BP  Min: 118/62  Max: 153/75 128/74   Pulse  Min: 58  Max: 65  60   Resp  Min: 18  Max: 20 20   SpO2  Min: 90 %  Max: 94 % (!) 91 %       Recent Labs   Lab 04/12/25  0857 04/13/25  0512 04/13/25  1219 04/15/25  0559   WBC 14.45* 16.95*  --  12.61  12.61*   RBC 4.32* 4.13*  --  3.01*   HGB 11.5* 11.3* 9.9* 8.2*   HCT 35.5* 33.8* 30.8* 24.7*   MCV 82.2 81.8  --  82.1   MCH 26.6* 27.4  --  27.2   MCHC 32.4* 33.4  --  33.2   RDW 15.8 15.9  --  16.7    226  --  198   MPV  10.5* 10.9*  --  10.3       Recent Labs   Lab 04/10/25  0621 04/11/25  0610 04/12/25  0857 04/13/25  0512 04/15/25  0559   *   < > 128* 130* 129*   K 3.7   < > 4.2 3.9 4.4   CL 94*   < > 95* 94* 100   CO2 22*   < > 20* 24 20*   BUN 26.3*   < > 21.8 32.5* 27.6*   CREATININE 1.18   < > 1.15 1.20 0.97   CALCIUM 8.6*   < > 9.0 9.2 8.2*   MG 2.30  --   --   --   --    ALBUMIN 3.6  --   --   --   --    ALKPHOS 47  --   --   --   --    ALT 9  --   --   --   --    AST 39  --   --   --   --    BILITOT 0.6  --   --   --   --     < > = values in this interval not displayed.          Microbiology Results (last 7 days)       ** No results found for the last 168 hours. **             Radiology:  CV Ultrasound Bilateral Doppler Carotid  The right internal carotid artery is patent with 50-69% stenosis.  The left internal carotid artery is occluded  Right vertebral artery is patent, left vertebral not well visualized.        Medications:  Scheduled Meds:   amitriptyline  25 mg Oral QHS    atorvastatin  80 mg Oral Daily    carvediloL  6.25 mg Oral BID    electrolytes-dextrose  400 mL Oral Q4H    enoxparin  40 mg Subcutaneous Q24H (prophylaxis, 1700)    gabapentin  300 mg Oral BID    methocarbamoL  500 mg Oral QID    mupirocin   Nasal BID    NIFEdipine  30 mg Oral Daily    sacubitriL-valsartan  1 tablet Oral BID     Continuous Infusions:  PRN Meds:.  Current Facility-Administered Medications:     acetaminophen, 650 mg, Oral, Q4H PRN    albuterol-ipratropium, 3 mL, Nebulization, Q6H PRN    ALPRAZolam, 0.5 mg, Oral, TID PRN    aluminum-magnesium hydroxide-simethicone, 30 mL, Oral, QID PRN    ammonium lactate, , Topical (Top), BID PRN    bisacodyL, 10 mg, Rectal, Daily PRN    ceFAZolin (Ancef) IV (PEDS and ADULTS), 2 g, Intravenous, On Call Procedure    dextrose 50%, 12.5 g, Intravenous, PRN    dextrose 50%, 12.5 g, Intravenous, PRN    dextrose 50%, 12.5 g, Intravenous, PRN    dextrose 50%, 25 g, Intravenous, PRN    glucagon (human  recombinant), 1 mg, Intramuscular, PRN    glucagon (human recombinant), 1 mg, Intramuscular, PRN    glucose, 16 g, Oral, PRN    glucose, 24 g, Oral, PRN    hydrALAZINE, 10 mg, Intravenous, Q4H PRN    HYDROmorphone, 0.4 mg, Intravenous, Q5 Min PRN    labetalol, 10 mg, Intravenous, QID PRN    melatonin, 9 mg, Oral, Nightly PRN    naloxone, 0.02 mg, Intravenous, PRN    nicotine, 1 patch, Transdermal, Daily PRN    ondansetron, 8 mg, Oral, Q8H PRN    ondansetron, 4 mg, Intravenous, Q8H PRN    oxyCODONE, 30 mg, Oral, Q4H PRN    polyethylene glycol, 17 g, Oral, TID PRN    simethicone, 1 tablet, Oral, QID PRN    sodium chloride 0.9%, 10 mL, Intravenous, PRN    sodium chloride 0.9%, 10 mL, Intravenous, PRN    Nutrition:  Nutrition consulted. Most recent weight and BMI monitored-     Measurements:  Wt Readings from Last 1 Encounters:   04/07/25 70.1 kg (154 lb 8.7 oz)   Body mass index is 20.39 kg/m².    Patient has been screened and assessed by RD.    Malnutrition Type:  Context: chronic illness  Level: moderate    Malnutrition Characteristic Summary:  Weight Loss (Malnutrition): other (see comments) (Does not meet criteria)  Energy Intake (Malnutrition): other (see comments) (Does not meet criteria)  Subcutaneous Fat (Malnutrition): mild depletion  Muscle Mass (Malnutrition): mild depletion  Fluid Accumulation (Malnutrition): other (see comments) (Not present)  Hand  Strength, Right (Malnutrition): Unable to assess    Interventions/Recommendations (treatment strategy):  Oral nutritional supplement        Assessment/Plan:   Left internal iliac/common femoral occlusion   Left lower extremity ischemia s/p B AKA 4/11, stump formalization 4/14  Distal thoracic aortic thrombus   SMA occlusion   NSTEMI, type 2 demand ischemia , resolved  Hypertensive urgency   Acute on chronic kidney disease, unknown baseline   Adrenal adenoma   Tobacco abuse      Follow up labs this morning.  Hemoglobin has been trending down the last couple  days.  Eight yesterday.  Transfuse if drops below 7.  Will get an iron profile that has well as folate and B12 today if not already performed.    Pain seems well controlled but he is still little groggy.  Discussed with palliative yesterday in the least his gabapentin.  Cardiology recommending follow up as an outpatient for stress test.  PT OT consulted  General surgery following along for wound care management    Yifan Cottrell MD   04/16/2025     All diagnosis and differential diagnosis have been reviewed; assessment and plan has been documented; I have personally reviewed the labs and test results that are presently available; I have reviewed the patients medication list; I have reviewed the consulting providers response and recommendations. I have reviewed or attempted to review medical records based upon their availability    All of the patient's questions have been  addressed and answered. Patient's is agreeable to the above stated plan. I will continue to monitor closely and make adjustments to medical management as needed.  _____________________________________________________________________

## 2025-04-16 NOTE — NURSING
1200: Entered patient room and patient was minimally responsive, unable to obtain BP, with HR in the 40s. MD notified immediately. Ordered to give 1L NS bolus and narcan. Placed patient in trendelenburg, started IV fluids and administered narcan. Patient became responsive again, BP 100s/60s. Blood sugar obtained was stable. MD saw patient at bedside.      1320: Patient found unresponsive, BP 90/40, pupils unequal and fixed, HR remaining in upper 40s, gurgling sounds coming from throat. MD notified again, RRT called per MD order, MD came promptly to bedside. IV fluids still running, attempted to suction patient, did not obtain anything. Unable to obtain oxygen reading, increased oxygen to 15L oxymask. MD spoke with ICU physician, patient will be transferred immediately. ABGs drawn at bedside, new IV started in RFA, patient promptly transferred to ICU, report given to nurse at bedside.

## 2025-04-16 NOTE — CODE/ RAPID DOCUMENTATION
RRT called at 1324. Arrived in room at 1329. Dr Cottrell at bedside. Placed pt on nonrebreather oxygen. ABGs done. New IV started. Pt opening eyes but unresponsive. BP 80s/50s. Dr Cottrell spoke with ICU doctors about moving to the unit. Pt started agonal breathing on the way to ICU, respiratory bagging pt. Got into ICU 9 at 1343. Pt pulseless. Dr Frank at bedside. Code started at 1344. See code doumentation.

## 2025-05-10 NOTE — PHYSICIAN QUERY
Due to the conflicting clinical picture, please clinically validate the diagnosis of sepsis with septic shock. If validated, please provide additional clinical support for the diagnosis:   The condition is not confirmed and/or it has been ruled out

## (undated) DEVICE — TOWEL OR DISP STRL BLUE 4/PK

## (undated) DEVICE — PROBE DOPPLER VTI DISP 8MHZ

## (undated) DEVICE — PENCIL E-Z CLEAN ROCKER 15FT

## (undated) DEVICE — PAD PREP CUFFED NS 24X48IN

## (undated) DEVICE — Device

## (undated) DEVICE — SUT 3-0 12-18IN SILK

## (undated) DEVICE — SUT VICRYL PLUS 3-0 SH 18IN

## (undated) DEVICE — SPONGE LAP STRL 18X18IN

## (undated) DEVICE — SOL NORMAL USPCA 0.9%

## (undated) DEVICE — SUT 1 8-18IN VICRYL PLUS CT

## (undated) DEVICE — CLIP LIGATING MEDIUM

## (undated) DEVICE — GAUZE XEROFORM NONADH 5X9IN

## (undated) DEVICE — BAG MEDI-PLAST DECANTER C-FLOW

## (undated) DEVICE — GLOVE PROTEXIS LTX MICRO  7.5

## (undated) DEVICE — PAD ABDOMINAL STERILE 8X10IN

## (undated) DEVICE — BOWL STERILE LARGE 32OZ

## (undated) DEVICE — GLOVE PROTEXIS HYDROGEL SZ7.5

## (undated) DEVICE — SYR 30CC LUER LOCK

## (undated) DEVICE — SET BLD COLL SAFETY 21G X 3/4

## (undated) DEVICE — APPLICATOR CHLORAPREP ORN 26ML

## (undated) DEVICE — COVER PROBE US 5.5X58L NON LTX

## (undated) DEVICE — SUT SILK 0 STRANDS 30IN BLK

## (undated) DEVICE — GAUZE SPONGE 4X4 12PLY

## (undated) DEVICE — LOOP STERION MINI RED 8X406MM

## (undated) DEVICE — SUT VICRYL 2-0 27 CT-1

## (undated) DEVICE — TRAY SKIN SCRUB WET PREMIUM

## (undated) DEVICE — DRAPE EXTREMITY HVY DTY REINF

## (undated) DEVICE — BANDAGE FLEX-MASTER CLP 6X11YD

## (undated) DEVICE — SUT MCRYL PLUS 4-0 PS2 27IN

## (undated) DEVICE — ELECTRODE PATIENT RETURN DISP

## (undated) DEVICE — NDL SYR 10ML 18X1.5 LL BLUNT

## (undated) DEVICE — SUT PROLENE 6-0 BV-1 30IN

## (undated) DEVICE — DRAPE FULL SHEET 70X100IN

## (undated) DEVICE — CATH EMB FGRTY 2FR 1 LUM 40CM

## (undated) DEVICE — TRAY CATH 1-LYR URIMTR 16FR

## (undated) DEVICE — GLOVE PROTEXIS HYDROGEL SZ8

## (undated) DEVICE — TUBING SUC UNIV W/CONN 12FT

## (undated) DEVICE — PAD ABD 8X10 STERILE

## (undated) DEVICE — SPONGE COTTON TRAY 4X4IN

## (undated) DEVICE — KIT SURGICAL TURNOVER

## (undated) DEVICE — BANDAGE COMPR VELCLOSE 4INX5YD

## (undated) DEVICE — SUT 2-0 12-18IN SILK

## (undated) DEVICE — STAPLER SKIN PROXIMATE WIDE

## (undated) DEVICE — STOCKINETTE IMPERVIOUS LARGE

## (undated) DEVICE — BANDAGE GAUZE COT STRL 4.5X4.1

## (undated) DEVICE — SPONGE X-RAY LAP DETCT 18X18IN

## (undated) DEVICE — BLADE SAW SAG 22.13MM 0.92MM

## (undated) DEVICE — TRAY CATH FOL SIL DRN BAG 16FR

## (undated) DEVICE — SUT PROLENE 5-0 24 C-1 BL

## (undated) DEVICE — BLADE SAW GUIDE GIG 20IN 10/BX

## (undated) DEVICE — DRAPE FLUID WARMER ORS 44X44IN

## (undated) DEVICE — SUT PROLENE 5-0 36IN C-1

## (undated) DEVICE — SUT VICRYL 3-0 27 SH

## (undated) DEVICE — FIBRILLAR ABS HEMOSTAT 4X4

## (undated) DEVICE — CATH EMB 3FR 80CM 8 MM

## (undated) DEVICE — SYS CLSR DERMABOND PRINEO 22CM

## (undated) DEVICE — SUT SILK 2-0 SH 18IN BLACK

## (undated) DEVICE — DRESSING TELFA N ADH 3X8

## (undated) DEVICE — GLOVE PROTEXIS BLUE LATEX 8

## (undated) DEVICE — GLOVE PROTEXIS BLUE LATEX 7.5

## (undated) DEVICE — SUT BONE WAX 2.5 GRMS 12/BX

## (undated) DEVICE — LOOP STERION MAXI YEL 1X406MM

## (undated) DEVICE — FLOWSWITCH 12/BX